# Patient Record
Sex: FEMALE | Race: WHITE | NOT HISPANIC OR LATINO | Employment: OTHER | URBAN - METROPOLITAN AREA
[De-identification: names, ages, dates, MRNs, and addresses within clinical notes are randomized per-mention and may not be internally consistent; named-entity substitution may affect disease eponyms.]

---

## 2017-05-02 RX ORDER — PHENAZOPYRIDINE HYDROCHLORIDE 200 MG/1
TABLET, FILM COATED ORAL
Qty: 12 TABLET | Refills: 0 | Status: SHIPPED | OUTPATIENT
Start: 2017-05-02 | End: 2021-02-11

## 2017-05-05 DIAGNOSIS — N93.0 PCB (POST COITAL BLEEDING): ICD-10-CM

## 2017-05-07 RX ORDER — ESTRADIOL 0.1 MG/G
CREAM VAGINAL
Qty: 42.5 G | Refills: 0 | Status: SHIPPED | OUTPATIENT
Start: 2017-05-07 | End: 2018-05-07 | Stop reason: SDUPTHER

## 2017-06-06 DIAGNOSIS — N93.0 PCB (POST COITAL BLEEDING): ICD-10-CM

## 2017-06-06 RX ORDER — ESTRADIOL 0.1 MG/G
CREAM VAGINAL
Qty: 42.5 G | Refills: 0 | Status: SHIPPED | OUTPATIENT
Start: 2017-06-06 | End: 2017-07-03 | Stop reason: SDUPTHER

## 2017-07-03 DIAGNOSIS — N93.0 PCB (POST COITAL BLEEDING): ICD-10-CM

## 2017-07-03 RX ORDER — ESTRADIOL 0.1 MG/G
CREAM VAGINAL
Qty: 42.5 G | Refills: 0 | Status: SHIPPED | OUTPATIENT
Start: 2017-07-03 | End: 2021-02-11

## 2017-07-11 ENCOUNTER — OFFICE VISIT (OUTPATIENT)
Dept: OCCUPATIONAL MEDICINE | Facility: CLINIC | Age: 67
End: 2017-07-11
Payer: OTHER GOVERNMENT

## 2017-07-11 VITALS — SYSTOLIC BLOOD PRESSURE: 122 MMHG | DIASTOLIC BLOOD PRESSURE: 80 MMHG

## 2017-07-11 DIAGNOSIS — M54.50 CHRONIC BILATERAL LOW BACK PAIN WITHOUT SCIATICA: Primary | ICD-10-CM

## 2017-07-11 DIAGNOSIS — G89.29 CHRONIC BILATERAL LOW BACK PAIN WITHOUT SCIATICA: Primary | ICD-10-CM

## 2017-07-11 DIAGNOSIS — S33.5XXD SPRAIN OF LIGAMENTS OF LUMBAR SPINE, SUBSEQUENT ENCOUNTER: ICD-10-CM

## 2017-07-11 DIAGNOSIS — M51.36 DEGENERATION OF LUMBAR INTERVERTEBRAL DISC: ICD-10-CM

## 2017-07-11 PROBLEM — M51.369 DEGENERATION OF LUMBAR INTERVERTEBRAL DISC: Status: ACTIVE | Noted: 2017-07-11

## 2017-07-11 PROBLEM — S33.5XXA SPRAIN OF LIGAMENTS OF LUMBAR SPINE: Status: ACTIVE | Noted: 2017-07-11

## 2017-07-11 PROCEDURE — 1159F MED LIST DOCD IN RCRD: CPT | Mod: S$GLB,,, | Performed by: PREVENTIVE MEDICINE

## 2017-07-11 PROCEDURE — 99202 OFFICE O/P NEW SF 15 MIN: CPT | Mod: S$GLB,,, | Performed by: PREVENTIVE MEDICINE

## 2017-07-11 RX ORDER — HYDROCODONE BITARTRATE AND ACETAMINOPHEN 10; 325 MG/1; MG/1
1 TABLET ORAL EVERY 8 HOURS PRN
Qty: 80 TABLET | Refills: 0 | Status: SHIPPED | OUTPATIENT
Start: 2017-07-11 | End: 2017-08-08 | Stop reason: SDUPTHER

## 2017-07-11 RX ORDER — HYDROCODONE BITARTRATE AND ACETAMINOPHEN 10; 325 MG/1; MG/1
TABLET ORAL
Refills: 0 | COMMUNITY
Start: 2017-06-06 | End: 2017-07-11 | Stop reason: SDUPTHER

## 2017-07-11 RX ORDER — CARISOPRODOL 350 MG/1
350 TABLET ORAL 2 TIMES DAILY
Qty: 40 TABLET | Refills: 0 | Status: SHIPPED | OUTPATIENT
Start: 2017-07-11 | End: 2017-08-08 | Stop reason: SDUPTHER

## 2017-07-11 RX ORDER — LIDOCAINE 50 MG/G
PATCH TOPICAL DAILY
Qty: 30 PATCH | Refills: 0 | OUTPATIENT
Start: 2017-07-11 | End: 2017-09-12 | Stop reason: SDUPTHER

## 2017-07-11 NOTE — LETTER
July 11, 2017      [facility] [place] [address]  [place] [address]  Phone: [phone]  Fax: [phone]       Patient: Sasha Michelle   YOB: 1950  Date of Visit: 07/11/2017    To Whom It May Concern:    Sasha Rivera was at [facility] on 07/11/2017. She may return to work/school on 7/11/2017 with permanent restrictions. Please return to clinic in 5 weeks on 8/15/17. If you have any questions or concerns, or if I can be of further assistance, please do not hesitate to contact our clinic.

## 2017-07-11 NOTE — PROGRESS NOTES
Subjective:       Patient ID: Sasha Michelle is a 66 y.o. female.    Chief Complaint: Lumbar Spine Pain (L-Spine) (Patient reports a pain level of 5, and denies radial pain.)    HPI  Review of Systems   Constitution: Negative.   HENT: Negative.    Eyes: Negative.    Cardiovascular: Negative.    Respiratory: Negative.    Endocrine: Negative.    Skin: Negative.    Musculoskeletal: Positive for back pain.   Gastrointestinal: Negative.    Genitourinary: Negative.    Allergic/Immunologic: Negative for hives.       Objective:      Physical Exam   Constitutional: She is oriented to person, place, and time. Vital signs are normal. She appears well-developed and well-nourished. She is active and cooperative. No distress.   HENT:   Head: Normocephalic and atraumatic.   Nose: Nose normal.   Mouth/Throat: Oropharynx is clear and moist and mucous membranes are normal.   Eyes: Conjunctivae and lids are normal.   Neck: Trachea normal, normal range of motion, full passive range of motion without pain and phonation normal. Neck supple.   Cardiovascular: Normal rate, regular rhythm, normal heart sounds, intact distal pulses and normal pulses.    Pulmonary/Chest: Effort normal and breath sounds normal.   Abdominal: Soft. Normal appearance and bowel sounds are normal. She exhibits no abdominal bruit, no pulsatile midline mass and no mass.   Musculoskeletal: She exhibits no edema.        Lumbar back: She exhibits decreased range of motion (flexion to 45, extension to 25 and lateral bending to 10 degrees), tenderness and spasm.   Neurological: She is alert and oriented to person, place, and time. She has normal strength and normal reflexes. No sensory deficit.   Skin: Skin is warm, dry and intact. She is not diaphoretic.   Psychiatric: She has a normal mood and affect. Her speech is normal and behavior is normal. Judgment and thought content normal. Cognition and memory are normal.   Nursing note and vitals reviewed.      Assessment:        1. Chronic bilateral low back pain without sciatica    2. Degeneration of lumbar intervertebral disc    3. Sprain of ligaments of lumbar spine, subsequent encounter        Plan:       Warm soaks at home daily with exercises  Return to clinic 4 weeks

## 2017-07-11 NOTE — PROGRESS NOTES
Subjective:       Patient ID: Sasha Michelle is a 66 y.o. female.    Chief Complaint: Lumbar Spine Pain (L-Spine) (Patient reports a pain level of 5, and denies radial pain.)    HPI  Review of Systems   Constitutional: Negative.    HENT: Negative.        Objective:      Physical Exam    Assessment:       No diagnosis found.    Plan:       ***

## 2017-08-08 ENCOUNTER — OFFICE VISIT (OUTPATIENT)
Dept: OCCUPATIONAL MEDICINE | Facility: CLINIC | Age: 67
End: 2017-08-08
Payer: OTHER GOVERNMENT

## 2017-08-08 DIAGNOSIS — G89.29 CHRONIC BILATERAL LOW BACK PAIN WITHOUT SCIATICA: ICD-10-CM

## 2017-08-08 DIAGNOSIS — M51.36 DEGENERATION OF LUMBAR INTERVERTEBRAL DISC: Primary | ICD-10-CM

## 2017-08-08 DIAGNOSIS — M54.50 CHRONIC BILATERAL LOW BACK PAIN WITHOUT SCIATICA: ICD-10-CM

## 2017-08-08 DIAGNOSIS — S33.5XXD SPRAIN OF LIGAMENTS OF LUMBAR SPINE, SUBSEQUENT ENCOUNTER: ICD-10-CM

## 2017-08-08 PROCEDURE — 1159F MED LIST DOCD IN RCRD: CPT | Mod: S$GLB,,, | Performed by: PREVENTIVE MEDICINE

## 2017-08-08 PROCEDURE — 99214 OFFICE O/P EST MOD 30 MIN: CPT | Mod: S$GLB,,, | Performed by: PREVENTIVE MEDICINE

## 2017-08-08 RX ORDER — CARISOPRODOL 350 MG/1
350 TABLET ORAL 2 TIMES DAILY
Qty: 40 TABLET | Refills: 0 | Status: SHIPPED | OUTPATIENT
Start: 2017-08-08 | End: 2017-09-12 | Stop reason: SDUPTHER

## 2017-08-08 RX ORDER — HYDROCODONE BITARTRATE AND ACETAMINOPHEN 10; 325 MG/1; MG/1
1 TABLET ORAL EVERY 8 HOURS PRN
Qty: 80 TABLET | Refills: 0 | Status: SHIPPED | OUTPATIENT
Start: 2017-08-08 | End: 2017-09-12 | Stop reason: SDUPTHER

## 2017-08-08 RX ORDER — IBUPROFEN 600 MG/1
600 TABLET ORAL EVERY 8 HOURS PRN
Qty: 60 TABLET | Refills: 0 | Status: SHIPPED | OUTPATIENT
Start: 2017-08-08 | End: 2018-02-06 | Stop reason: SDUPTHER

## 2017-08-08 NOTE — PROGRESS NOTES
Subjective:       Patient ID: Sasha Michelle is a 66 y.o. female.    Chief Complaint: Back Pain (Patient is a retired [employer] employee returning for a follow up on back injury sustained in 3/15/2001)    Back Pain       Review of Systems   Musculoskeletal: Positive for back pain.       Objective:      Physical Exam   Constitutional: She appears well-developed and well-nourished.   HENT:   Head: Normocephalic and atraumatic.   Eyes: EOM are normal. Pupils are equal, round, and reactive to light.   Neck: Normal range of motion.   Cardiovascular: Normal rate.    Pulmonary/Chest: Effort normal.   Musculoskeletal:        Right shoulder: She exhibits tenderness, pain and spasm. She exhibits no bony tenderness, no swelling, no effusion, no crepitus, no deformity, no laceration, normal pulse and normal strength.        Lumbar back: She exhibits decreased range of motion, tenderness and pain. She exhibits no bony tenderness, no swelling, no edema, no deformity, no laceration, no spasm and normal pulse.        Arms:  Neurological: She is alert.   No focal neurologic deficits   Skin: Skin is warm and dry.   Psychiatric: She has a normal mood and affect.   Nursing note and vitals reviewed.      Assessment:       1. Degeneration of lumbar intervertebral disc    2. Chronic bilateral low back pain without sciatica    3. Sprain of ligaments of lumbar spine, subsequent encounter        Plan:           Medications Ordered This Encounter      carisoprodol (SOMA) 350 MG tablet          Sig: Take 1 tablet (350 mg total) by mouth 2 (two) times daily.          Dispense:  40 tablet          Refill:  0      hydrocodone-acetaminophen 10-325mg (NORCO)  mg Tab          Sig: Take 1 tablet by mouth every 8 (eight) hours as needed for Pain.          Dispense:  80 tablet          Refill:  0      ibuprofen (ADVIL,MOTRIN) 600 MG tablet          Sig: Take 1 tablet (600 mg total) by mouth every 8 (eight) hours as needed.          Dispense:  60  tablet          Refill:  0  Patient Instructions: Daily home exercises/warm soaks              Return in about 5 weeks (around 9/12/2017).

## 2017-09-12 ENCOUNTER — OFFICE VISIT (OUTPATIENT)
Dept: OCCUPATIONAL MEDICINE | Facility: CLINIC | Age: 67
End: 2017-09-12
Payer: OTHER GOVERNMENT

## 2017-09-12 VITALS — DIASTOLIC BLOOD PRESSURE: 78 MMHG | SYSTOLIC BLOOD PRESSURE: 120 MMHG | HEART RATE: 61 BPM | OXYGEN SATURATION: 98 %

## 2017-09-12 DIAGNOSIS — M51.36 DEGENERATION OF LUMBAR INTERVERTEBRAL DISC: Primary | ICD-10-CM

## 2017-09-12 DIAGNOSIS — S33.5XXD SPRAIN OF LIGAMENTS OF LUMBAR SPINE, SUBSEQUENT ENCOUNTER: ICD-10-CM

## 2017-09-12 DIAGNOSIS — M54.50 CHRONIC BILATERAL LOW BACK PAIN WITHOUT SCIATICA: ICD-10-CM

## 2017-09-12 DIAGNOSIS — G89.29 CHRONIC BILATERAL LOW BACK PAIN WITHOUT SCIATICA: ICD-10-CM

## 2017-09-12 PROCEDURE — 1159F MED LIST DOCD IN RCRD: CPT | Mod: S$GLB,,, | Performed by: PREVENTIVE MEDICINE

## 2017-09-12 PROCEDURE — 1125F AMNT PAIN NOTED PAIN PRSNT: CPT | Mod: S$GLB,,, | Performed by: PREVENTIVE MEDICINE

## 2017-09-12 PROCEDURE — 99214 OFFICE O/P EST MOD 30 MIN: CPT | Mod: S$GLB,,, | Performed by: PREVENTIVE MEDICINE

## 2017-09-12 RX ORDER — LIDOCAINE 50 MG/G
PATCH TOPICAL DAILY
Qty: 30 PATCH | Refills: 0 | Status: SHIPPED | OUTPATIENT
Start: 2017-09-12 | End: 2017-10-10 | Stop reason: SDUPTHER

## 2017-09-12 RX ORDER — CARISOPRODOL 350 MG/1
350 TABLET ORAL 2 TIMES DAILY
Qty: 40 TABLET | Refills: 0 | Status: SHIPPED | OUTPATIENT
Start: 2017-09-12 | End: 2017-10-10 | Stop reason: SDUPTHER

## 2017-09-12 RX ORDER — HYDROCODONE BITARTRATE AND ACETAMINOPHEN 10; 325 MG/1; MG/1
1 TABLET ORAL EVERY 8 HOURS PRN
Qty: 80 TABLET | Refills: 0 | Status: SHIPPED | OUTPATIENT
Start: 2017-09-12 | End: 2017-10-10 | Stop reason: SDUPTHER

## 2017-09-12 NOTE — LETTER
[facility]  [address] [address]  [place] [address]  Phone: [phone]  Fax: [phone]    Pt Name: Sasha Michelle  Injury Date: 03/15/2017   Employee ID:  Date of Treatment: 09/12/2017   Company: [employer]            Appointment Time: 09:45 AM Arrived: 10:00 AM CDT   Physician: Miguel Staples MD Departed: 11:22 AM           Office Treatment: Sasha was seen today for back pain.    Diagnoses and all orders for this visit:    Degeneration of lumbar intervertebral disc  Chronic bilateral low back pain without sciatica  Sprain of ligaments of lumbar spine, subsequent encounter    Other orders  -     lidocaine (LIDODERM) 5 %; Place onto the skin once daily.  -     hydrocodone-acetaminophen 10-325mg (NORCO)  mg Tab; Take 1 tablet by mouth every 8 (eight) hours as needed for Pain.  -     carisoprodol (SOMA) 350 MG tablet; Take 1 tablet (350 mg total) by mouth 2 (two) times daily.       Patient Instructions: Daily home exercises/warm soaks            Return Appointment:   10/10/2017  at  10:15 AM

## 2017-09-12 NOTE — PROGRESS NOTES
Subjective:       Patient ID: Sasha Michelle is a 66 y.o. female.    Chief Complaint: Back Pain (Retired [employer] employee returns for followup of back injury sustained in 3/15/2001.)    Patient reports neck pain hasn't change much since last visit, however she continues to use rx as directed and home exercises. (yal)      Back Pain   The current episode started more than 1 year ago. The problem occurs intermittently. The pain is present in the lumbar spine. The quality of the pain is described as burning. The pain does not radiate. The pain is at a severity of 4/10. The pain is moderate. The pain is worse during the day. The symptoms are aggravated by sitting. Stiffness is present in the morning. Pertinent negatives include no abdominal pain, bladder incontinence, bowel incontinence, chest pain, dysuria, headaches, numbness or tingling. She has tried muscle relaxant and home exercises for the symptoms. The treatment provided moderate relief.     Review of Systems   Constitution: Negative for chills.   HENT: Negative for tinnitus.    Eyes: Negative for blurred vision.   Cardiovascular: Negative for chest pain.   Respiratory: Negative for shortness of breath.    Skin: Negative for itching and rash.   Musculoskeletal: Positive for back pain, myalgias and stiffness. Negative for neck pain.   Gastrointestinal: Negative for abdominal pain, bowel incontinence, nausea and vomiting.   Genitourinary: Negative for bladder incontinence and dysuria.   Neurological: Positive for dizziness. Negative for headaches, numbness and tingling.   Allergic/Immunologic: Negative for hives.       Objective:      Physical Exam   Constitutional: She appears well-developed and well-nourished.   HENT:   Head: Normocephalic and atraumatic.   Eyes: EOM are normal.   Neck: Normal range of motion.   Cardiovascular: Normal rate.    Pulmonary/Chest: Effort normal.   Musculoskeletal:        Lumbar back: She exhibits decreased range of motion, tenderness,  pain and spasm. She exhibits no bony tenderness, no swelling, no edema, no deformity, no laceration and normal pulse.        Back:    Neurological: She is alert.   No focal neurologic deficits   Skin: Skin is warm and dry.   Psychiatric: She has a normal mood and affect.   Nursing note and vitals reviewed.      Assessment:       1. Degeneration of lumbar intervertebral disc    2. Chronic bilateral low back pain without sciatica    3. Sprain of ligaments of lumbar spine, subsequent encounter        Plan:           Medications Ordered This Encounter      carisoprodol (SOMA) 350 MG tablet          Sig: Take 1 tablet (350 mg total) by mouth 2 (two) times daily.          Dispense:  40 tablet          Refill:  0      hydrocodone-acetaminophen 10-325mg (NORCO)  mg Tab          Sig: Take 1 tablet by mouth every 8 (eight) hours as needed for Pain.          Dispense:  80 tablet          Refill:  0      lidocaine (LIDODERM) 5 %          Sig: Place onto the skin once daily.          Dispense:  30 patch          Refill:  0  Patient Instructions: Daily home exercises/warm soaks      Return in about 4 weeks (around 10/10/2017).

## 2017-10-10 ENCOUNTER — OFFICE VISIT (OUTPATIENT)
Dept: OCCUPATIONAL MEDICINE | Facility: CLINIC | Age: 67
End: 2017-10-10
Payer: OTHER GOVERNMENT

## 2017-10-10 DIAGNOSIS — M51.36 DEGENERATION OF LUMBAR INTERVERTEBRAL DISC: Primary | ICD-10-CM

## 2017-10-10 DIAGNOSIS — M54.50 CHRONIC BILATERAL LOW BACK PAIN WITHOUT SCIATICA: ICD-10-CM

## 2017-10-10 DIAGNOSIS — G89.29 CHRONIC BILATERAL LOW BACK PAIN WITHOUT SCIATICA: ICD-10-CM

## 2017-10-10 DIAGNOSIS — S33.5XXD SPRAIN OF LIGAMENTS OF LUMBAR SPINE, SUBSEQUENT ENCOUNTER: ICD-10-CM

## 2017-10-10 PROCEDURE — 99214 OFFICE O/P EST MOD 30 MIN: CPT | Mod: S$GLB,,, | Performed by: PREVENTIVE MEDICINE

## 2017-10-10 RX ORDER — CARISOPRODOL 350 MG/1
350 TABLET ORAL 2 TIMES DAILY
Qty: 40 TABLET | Refills: 0 | Status: SHIPPED | OUTPATIENT
Start: 2017-10-10 | End: 2017-11-09 | Stop reason: SDUPTHER

## 2017-10-10 RX ORDER — LIDOCAINE 50 MG/G
PATCH TOPICAL DAILY
Qty: 30 PATCH | Refills: 0 | Status: SHIPPED | OUTPATIENT
Start: 2017-10-10 | End: 2017-11-09 | Stop reason: SDUPTHER

## 2017-10-10 RX ORDER — HYDROCODONE BITARTRATE AND ACETAMINOPHEN 10; 325 MG/1; MG/1
1 TABLET ORAL EVERY 8 HOURS PRN
Qty: 80 TABLET | Refills: 0 | Status: SHIPPED | OUTPATIENT
Start: 2017-10-10 | End: 2017-11-09 | Stop reason: SDUPTHER

## 2017-10-10 NOTE — PROGRESS NOTES
Subjective:       Patient ID: Sasha Michelle is a 66 y.o. female.    Chief Complaint: Back Pain (follow up from 03/2001) and Hip Pain (follow up from 03/2001)    Pt is here for a follow up on a back injury from 03/2001.  She says her back and right hip are hurting and the pain comes and goes.  CT      Back Pain   This is a chronic problem. The current episode started more than 1 year ago. The problem occurs intermittently. The problem is unchanged. The pain is present in the lumbar spine and sacro-iliac. The quality of the pain is described as aching. The pain radiates to the right thigh. The pain is at a severity of 6/10. The pain is moderate. The pain is the same all the time. The symptoms are aggravated by twisting, bending, lying down, position, sitting and standing. Associated symptoms include weakness. She has tried heat, home exercises, ice, muscle relaxant, NSAIDs, walking and bed rest for the symptoms. The treatment provided mild relief.   Hip Pain    The incident occurred more than 1 week ago. The incident occurred at work. The injury mechanism is unknown. The pain is present in the right hip. The pain is at a severity of 6/10. The pain is moderate. The pain has been fluctuating since onset. Associated symptoms include muscle weakness. She reports no foreign bodies present. The symptoms are aggravated by movement and weight bearing. She has tried elevation, heat, ice, NSAIDs, non-weight bearing and rest for the symptoms. The treatment provided no relief.     Review of Systems   Constitution: Positive for weakness.   Musculoskeletal: Positive for back pain, joint pain, muscle weakness and stiffness.       Objective:      Physical Exam   Constitutional: She appears well-developed and well-nourished.   HENT:   Head: Normocephalic.   Eyes: Pupils are equal, round, and reactive to light.   Neck: Normal range of motion.   Cardiovascular: Normal rate.    Pulmonary/Chest: Effort normal.   Musculoskeletal:         Lumbar back: She exhibits decreased range of motion, tenderness and pain. She exhibits no bony tenderness, no swelling, no edema, no deformity, no laceration, no spasm and normal pulse.        Back:    Pain about low back with limited range of motion. Pain with flexion, extension and rotation of the back.   Neurological: She is alert.   No focal neurologic deficits   Skin: Skin is warm.   Psychiatric: She has a normal mood and affect.   Nursing note and vitals reviewed.      Assessment:       1. Degeneration of lumbar intervertebral disc    2. Chronic bilateral low back pain without sciatica    3. Sprain of ligaments of lumbar spine, subsequent encounter        Plan:           Medications Ordered This Encounter      carisoprodol (SOMA) 350 MG tablet          Sig: Take 1 tablet (350 mg total) by mouth 2 (two) times daily.          Dispense:  40 tablet          Refill:  0      hydrocodone-acetaminophen 10-325mg (NORCO)  mg Tab          Sig: Take 1 tablet by mouth every 8 (eight) hours as needed for Pain.          Dispense:  80 tablet          Refill:  0      lidocaine (LIDODERM) 5 %          Sig: Place onto the skin once daily.          Dispense:  30 patch          Refill:  0  Patient Instructions: Daily home exercises/warm soaks   Restrictions: Disabled until next office visit  Return in about 4 weeks (around 11/7/2017).

## 2017-10-10 NOTE — LETTER
[facility] [place] [address]  [place] [address]  Phone: [phone]  Fax: [phone]    Pt Name: Sasha Michelle  Injury Date: 05/2001   Employee ID:  Date of Treatment: 10/10/2017   Company: [employer]            Appointment Time: 10:00 AM Arrived: 10:15 AM CDT   Physician: Miguel Staples MD Departed: 10:50 AM           Office Treatment: Sasha was seen today for back pain and hip pain.    Diagnoses and all orders for this visit: EXAM, RX GIVEN    Degeneration of lumbar intervertebral disc    Chronic bilateral low back pain without sciatica  -     lidocaine (LIDODERM) 5 %; Place onto the skin once daily.  -     carisoprodol (SOMA) 350 MG tablet; Take 1 tablet (350 mg total) by mouth 2 (two) times daily.    Sprain of ligaments of lumbar spine, subsequent encounter  -     hydrocodone-acetaminophen 10-325mg (NORCO)  mg Tab; Take 1 tablet by mouth every 8 (eight) hours as needed for Pain.     Patient Instructions: Daily home exercises/warm soaks    Restrictions: Disabled until next office visit 11/07/2017 @9:00 AM       Return Appointment: 11/07/2017 @ 9:00 AM

## 2017-11-09 ENCOUNTER — OFFICE VISIT (OUTPATIENT)
Dept: OCCUPATIONAL MEDICINE | Facility: CLINIC | Age: 67
End: 2017-11-09
Payer: OTHER GOVERNMENT

## 2017-11-09 DIAGNOSIS — M51.36 DEGENERATION OF LUMBAR INTERVERTEBRAL DISC: Primary | ICD-10-CM

## 2017-11-09 DIAGNOSIS — S33.5XXD SPRAIN OF LIGAMENTS OF LUMBAR SPINE, SUBSEQUENT ENCOUNTER: ICD-10-CM

## 2017-11-09 DIAGNOSIS — G89.29 CHRONIC BILATERAL LOW BACK PAIN WITHOUT SCIATICA: ICD-10-CM

## 2017-11-09 DIAGNOSIS — M54.50 CHRONIC BILATERAL LOW BACK PAIN WITHOUT SCIATICA: ICD-10-CM

## 2017-11-09 PROCEDURE — 99214 OFFICE O/P EST MOD 30 MIN: CPT | Mod: S$GLB,,, | Performed by: PREVENTIVE MEDICINE

## 2017-11-09 RX ORDER — HYDROCODONE BITARTRATE AND ACETAMINOPHEN 10; 325 MG/1; MG/1
1 TABLET ORAL EVERY 8 HOURS PRN
Qty: 80 TABLET | Refills: 0 | Status: SHIPPED | OUTPATIENT
Start: 2017-11-09 | End: 2017-12-05 | Stop reason: SDUPTHER

## 2017-11-09 RX ORDER — LIDOCAINE 50 MG/G
PATCH TOPICAL DAILY
Qty: 30 PATCH | Refills: 0 | Status: SHIPPED | OUTPATIENT
Start: 2017-11-09 | End: 2018-02-06 | Stop reason: SDUPTHER

## 2017-11-09 RX ORDER — CARISOPRODOL 350 MG/1
350 TABLET ORAL 2 TIMES DAILY
Qty: 40 TABLET | Refills: 0 | Status: SHIPPED | OUTPATIENT
Start: 2017-11-09 | End: 2017-12-05 | Stop reason: SDUPTHER

## 2017-11-09 NOTE — LETTER
[facility] [place] [address]  [place] [address]  Phone: [phone]  Fax: [phone]    Pt Name: Sasha Michelle  Injury Date: 03/2001   Employee ID:  Date of Treatment: 11/09/2017   Company: [employer]            Appointment Time: 09:45 AM Arrived: 08:30 AM CST   Physician: Miguel Staples MD DEPARTED: 1215 PM           Office Treatment: Sasha was seen today for back pain.    Diagnoses and all orders for this visit: EXAM, RX GIVEN, DISABLED UNTIL NEXT OFFICE VISIT    Degeneration of lumbar intervertebral disc  Chronic bilateral low back pain without sciatica  -     lidocaine (LIDODERM) 5 %; Place onto the skin once daily.  -     carisoprodol (SOMA) 350 MG tablet; Take 1 tablet (350 mg total) by mouth 2 (two) times daily.  Sprain of ligaments of lumbar spine, subsequent encounter  -     hydrocodone-acetaminophen 10-325mg (NORCO)  mg Tab; Take 1 tablet by mouth every 8 (eight) hours as needed for Pain.     Patient Instructions: Daily home exercises/warm soaks    Restrictions: Disabled until next office visit       Return Appointment: FOLLOW UP ON 12/5/17 @ 0845 AM

## 2017-11-09 NOTE — PROGRESS NOTES
Subjective:       Patient ID: Sasha Michelle is a 66 y.o. female.    Chief Complaint: Back Pain (FOLLOW UP FROM 03/15/2001)    Pt is here for a FOLLOW UP on a BACK injury from 03/15/2001.  She just got back from a vacation with her  where she sat for over 2 hours in the car.  Her RIGHT HIP has begun to ache again since then.  She states that she has started walking since her return which is helping.  CT      Back Pain   This is a chronic problem. The current episode started more than 1 year ago. The problem occurs intermittently. The problem is unchanged. The pain is present in the lumbar spine and sacro-iliac. The quality of the pain is described as cramping and aching. The pain radiates to the right thigh. The pain is at a severity of 4/10. The pain is mild. The pain is worse during the day. The symptoms are aggravated by sitting and position. Stiffness is present in the morning. Pertinent negatives include no abdominal pain, bladder incontinence, bowel incontinence, dysuria or numbness. She has tried home exercises, heat, muscle relaxant and NSAIDs for the symptoms. The treatment provided mild relief.     Review of Systems   Constitution: Negative for malaise/fatigue.   Skin: Negative for rash.   Musculoskeletal: Positive for back pain, muscle weakness and stiffness. Negative for muscle cramps.   Gastrointestinal: Negative for abdominal pain and bowel incontinence.   Genitourinary: Negative for bladder incontinence, dysuria, hematuria and urgency.   Neurological: Negative for disturbances in coordination and numbness.   All other systems reviewed and are negative.      Objective:      Physical Exam   Constitutional: She appears well-developed and well-nourished.   HENT:   Head: Normocephalic and atraumatic.   Eyes: EOM are normal.   Neck: Normal range of motion.   Cardiovascular: Normal rate.    Pulmonary/Chest: Effort normal.   Musculoskeletal:        Lumbar back: She exhibits decreased range of motion,  tenderness, pain and spasm. She exhibits no bony tenderness, no swelling, no edema, no deformity, no laceration and normal pulse.        Back:    Pain about low back limited by complaints of pain with flexion, extension and rotation of back. No focal neurologic deficits noted   Neurological: She is alert.   No focal neurologic deficits   Skin: Skin is warm and dry.   Psychiatric: She has a normal mood and affect.   Nursing note and vitals reviewed.      Assessment:       1. Degeneration of lumbar intervertebral disc    2. Chronic bilateral low back pain without sciatica    3. Sprain of ligaments of lumbar spine, subsequent encounter        Plan:           Medications Ordered This Encounter      carisoprodol (SOMA) 350 MG tablet          Sig: Take 1 tablet (350 mg total) by mouth 2 (two) times daily.          Dispense:  40 tablet          Refill:  0      hydrocodone-acetaminophen 10-325mg (NORCO)  mg Tab          Sig: Take 1 tablet by mouth every 8 (eight) hours as needed for Pain.          Dispense:  80 tablet          Refill:  0      lidocaine (LIDODERM) 5 %          Sig: Place onto the skin once daily.          Dispense:  30 patch          Refill:  0  Patient Instructions: Daily home exercises/warm soaks   Restrictions: Disabled until next office visit  Return in about 4 weeks (around 12/7/2017).

## 2017-12-05 ENCOUNTER — OFFICE VISIT (OUTPATIENT)
Dept: OCCUPATIONAL MEDICINE | Facility: CLINIC | Age: 67
End: 2017-12-05
Payer: OTHER GOVERNMENT

## 2017-12-05 DIAGNOSIS — M51.36 DEGENERATION OF LUMBAR INTERVERTEBRAL DISC: Primary | ICD-10-CM

## 2017-12-05 DIAGNOSIS — M54.50 CHRONIC BILATERAL LOW BACK PAIN WITHOUT SCIATICA: ICD-10-CM

## 2017-12-05 DIAGNOSIS — S33.5XXD SPRAIN OF LIGAMENTS OF LUMBAR SPINE, SUBSEQUENT ENCOUNTER: ICD-10-CM

## 2017-12-05 DIAGNOSIS — G89.29 CHRONIC BILATERAL LOW BACK PAIN WITHOUT SCIATICA: ICD-10-CM

## 2017-12-05 PROCEDURE — 99214 OFFICE O/P EST MOD 30 MIN: CPT | Mod: S$GLB,,, | Performed by: PREVENTIVE MEDICINE

## 2017-12-05 RX ORDER — HYDROCODONE BITARTRATE AND ACETAMINOPHEN 10; 325 MG/1; MG/1
1 TABLET ORAL EVERY 8 HOURS PRN
Qty: 80 TABLET | Refills: 0 | Status: SHIPPED | OUTPATIENT
Start: 2017-12-05 | End: 2018-02-06 | Stop reason: SDUPTHER

## 2017-12-05 RX ORDER — CARISOPRODOL 350 MG/1
350 TABLET ORAL 2 TIMES DAILY
Qty: 40 TABLET | Refills: 0 | Status: SHIPPED | OUTPATIENT
Start: 2017-12-05 | End: 2018-01-14

## 2017-12-05 NOTE — LETTER
[facility] [place] [address]  [place] [address]  Phone: [phone]  Fax: [phone]    Pt Name: Sasha Michelle  Injury Date: 03/15/2001   Employee ID:  Date of First Treatment: 12/05/2017   Company: [employer]            Appointment Time: 08:40 AM Arrived:  8:55 AM CST   Physician: Miguel Staples MD Departed: 10:00 AM           Office Treatment: Sasha was seen today for back pain.    Diagnoses and all orders for this visit: EXAM, DISABLED UNTIL NEXT OFFICE VISIT    Degeneration of lumbar intervertebral disc  Chronic bilateral low back pain without sciatica  -     carisoprodol (SOMA) 350 MG tablet; Take 1 tablet (350 mg total) by mouth 2 (two) times daily.  Sprain of ligaments of lumbar spine, subsequent encounter  -     hydrocodone-acetaminophen 10-325mg (NORCO)  mg Tab; Take 1 tablet by mouth every 8 (eight) hours as needed for Pain.       Patient Instructions: Daily home exercises/warm soaks    Restrictions: Disabled until next office visit       Return Appointment: 01/02/2018  @ 0845 AM

## 2017-12-05 NOTE — PROGRESS NOTES
Subjective:       Patient ID: Sasha Michelle is a 66 y.o. female.    Chief Complaint: Back Pain (RV)    Pt returned to the clinic today for a follow up visit for back pain. Pt states her injury has not improved since her last office visit and things are still the same. IJ      Back Pain   This is a recurrent problem. The current episode started more than 1 month ago. The problem occurs constantly. The problem is unchanged. The pain is present in the lumbar spine. The quality of the pain is described as shooting. The pain is at a severity of 6/10. The pain is mild. The pain is the same all the time. The symptoms are aggravated by bending. Pertinent negatives include no abdominal pain, chest pain, fever or headaches.     Review of Systems   Constitution: Negative for chills and fever.   HENT: Negative for sore throat.    Eyes: Negative for blurred vision.   Cardiovascular: Negative for chest pain.   Respiratory: Negative for shortness of breath.    Skin: Negative for rash.   Musculoskeletal: Positive for back pain. Negative for joint pain.   Gastrointestinal: Negative for abdominal pain, diarrhea, nausea and vomiting.   Neurological: Negative for headaches.   Psychiatric/Behavioral: The patient is not nervous/anxious.        Objective:      Physical Exam   Constitutional: She appears well-developed and well-nourished.   HENT:   Head: Normocephalic and atraumatic.   Eyes: EOM are normal. Pupils are equal, round, and reactive to light.   Neck: Normal range of motion. Neck supple.   Cardiovascular: Normal rate.    Pulmonary/Chest: Effort normal.   Musculoskeletal:        Cervical back: She exhibits decreased range of motion, tenderness and pain. She exhibits no bony tenderness, no swelling, no edema, no deformity, no laceration, no spasm and normal pulse.        Lumbar back: She exhibits decreased range of motion, tenderness, pain and spasm. She exhibits no bony tenderness, no swelling, no edema, no deformity, no  laceration and normal pulse.        Back:    Pain with palpation of low back with flexion to 90, extension to 25 and lateral bending to 25 degrees. No motor or sensory deficits.    Neurological: She is alert.   No focal neurologic deficits   Skin: Skin is warm.   Psychiatric: She has a normal mood and affect. Her behavior is normal. Judgment and thought content normal.   Nursing note and vitals reviewed.      Assessment:       1. Degeneration of lumbar intervertebral disc    2. Chronic bilateral low back pain without sciatica    3. Sprain of ligaments of lumbar spine, subsequent encounter        Plan:           Medications Ordered This Encounter      carisoprodol (SOMA) 350 MG tablet          Sig: Take 1 tablet (350 mg total) by mouth 2 (two) times daily.          Dispense:  40 tablet          Refill:  0      hydrocodone-acetaminophen 10-325mg (NORCO)  mg Tab          Sig: Take 1 tablet by mouth every 8 (eight) hours as needed for Pain.          Dispense:  80 tablet          Refill:  0  Patient Instructions: Daily home exercises/warm soaks   Restrictions: Disabled until next office visit  Return in about 4 weeks (around 1/4/2018).

## 2017-12-29 ENCOUNTER — TELEPHONE (OUTPATIENT)
Dept: OCCUPATIONAL MEDICINE | Facility: CLINIC | Age: 67
End: 2017-12-29

## 2017-12-29 NOTE — TELEPHONE ENCOUNTER
I called pt to ask her to reschedule due to Dr. Staples most likely not coming that day.  She said she will need refills on her soma, hydrocodone, and lido patches soon as he returns.  I said I would take care if it for her and call her when ready.  CT

## 2018-02-06 ENCOUNTER — OFFICE VISIT (OUTPATIENT)
Dept: OCCUPATIONAL MEDICINE | Facility: CLINIC | Age: 68
End: 2018-02-06
Payer: OTHER GOVERNMENT

## 2018-02-06 DIAGNOSIS — M51.36 DEGENERATION OF LUMBAR INTERVERTEBRAL DISC: Primary | ICD-10-CM

## 2018-02-06 DIAGNOSIS — M54.50 CHRONIC BILATERAL LOW BACK PAIN WITHOUT SCIATICA: ICD-10-CM

## 2018-02-06 DIAGNOSIS — G89.29 CHRONIC BILATERAL LOW BACK PAIN WITHOUT SCIATICA: ICD-10-CM

## 2018-02-06 DIAGNOSIS — S33.5XXD SPRAIN OF LIGAMENTS OF LUMBAR SPINE, SUBSEQUENT ENCOUNTER: ICD-10-CM

## 2018-02-06 PROCEDURE — 99213 OFFICE O/P EST LOW 20 MIN: CPT | Mod: S$GLB,,, | Performed by: PREVENTIVE MEDICINE

## 2018-02-06 PROCEDURE — 1159F MED LIST DOCD IN RCRD: CPT | Mod: S$GLB,,, | Performed by: PREVENTIVE MEDICINE

## 2018-02-06 RX ORDER — CARISOPRODOL 350 MG/1
350 TABLET ORAL 2 TIMES DAILY PRN
Qty: 40 TABLET | Refills: 0 | Status: SHIPPED | OUTPATIENT
Start: 2018-02-06 | End: 2018-03-06 | Stop reason: SDUPTHER

## 2018-02-06 RX ORDER — IBUPROFEN 600 MG/1
600 TABLET ORAL EVERY 8 HOURS PRN
Qty: 60 TABLET | Refills: 0 | Status: SHIPPED | OUTPATIENT
Start: 2018-02-06 | End: 2018-03-06 | Stop reason: SDUPTHER

## 2018-02-06 RX ORDER — HYDROCODONE BITARTRATE AND ACETAMINOPHEN 10; 325 MG/1; MG/1
1 TABLET ORAL EVERY 8 HOURS PRN
Qty: 80 TABLET | Refills: 0 | Status: SHIPPED | OUTPATIENT
Start: 2018-02-06 | End: 2018-03-06 | Stop reason: SDUPTHER

## 2018-02-06 RX ORDER — LIDOCAINE 50 MG/G
PATCH TOPICAL DAILY
Qty: 30 PATCH | Refills: 0 | Status: SHIPPED | OUTPATIENT
Start: 2018-02-06 | End: 2018-03-06 | Stop reason: SDUPTHER

## 2018-02-06 NOTE — PROGRESS NOTES
Subjective:       Patient ID: Sasha Michelle is a 67 y.o. female.    Chief Complaint: Back Pain    Pt returned to the clinic today for a follow up visit for back pain. Pt states her injury has not improved since her last office visit and things are still the same. CT      Back Pain   This is a recurrent problem. The current episode started more than 1 month ago. The problem occurs constantly. The problem is unchanged. The pain is present in the lumbar spine. The quality of the pain is described as shooting. The pain is at a severity of 6/10. The pain is mild. The pain is the same all the time. The symptoms are aggravated by bending. Pertinent negatives include no abdominal pain, chest pain, fever or headaches.     Review of Systems   Constitution: Negative for chills and fever.   HENT: Negative for sore throat.    Eyes: Negative for blurred vision.   Cardiovascular: Negative for chest pain.   Respiratory: Negative for shortness of breath.    Skin: Negative for rash.   Musculoskeletal: Positive for back pain. Negative for joint pain.   Gastrointestinal: Negative for abdominal pain, diarrhea, nausea and vomiting.   Neurological: Negative for headaches.   Psychiatric/Behavioral: The patient is not nervous/anxious.        Objective:      Physical Exam   Constitutional: She appears well-developed and well-nourished.   HENT:   Head: Normocephalic and atraumatic.   Eyes: EOM are normal. Pupils are equal, round, and reactive to light.   Neck: Normal range of motion. Neck supple.   Cardiovascular: Normal rate.    Pulmonary/Chest: Effort normal.   Musculoskeletal:        Cervical back: She exhibits decreased range of motion, tenderness and pain. She exhibits no bony tenderness, no swelling, no edema, no deformity, no laceration, no spasm and normal pulse.        Lumbar back: She exhibits decreased range of motion, tenderness, pain and spasm. She exhibits no bony tenderness, no swelling, no edema, no deformity, no laceration  and normal pulse.        Back:    Pain with palpation of low back with flexion to 90, extension to 25 and lateral bending to 25 degrees. No motor or sensory deficits.    Neurological: She is alert.   No focal neurologic deficits   Skin: Skin is warm.   Psychiatric: She has a normal mood and affect. Her behavior is normal. Judgment and thought content normal.   Nursing note and vitals reviewed.      Assessment:       1. Degeneration of lumbar intervertebral disc    2. Chronic bilateral low back pain without sciatica    3. Sprain of ligaments of lumbar spine, subsequent encounter        Plan:           Medications Ordered This Encounter      carisoprodol (SOMA) 350 MG tablet          Sig: Take 1 tablet (350 mg total) by mouth 2 (two) times daily as needed for Muscle spasms.          Dispense:  40 tablet          Refill:  0      hydrocodone-acetaminophen 10-325mg (NORCO)  mg Tab          Sig: Take 1 tablet by mouth every 8 (eight) hours as needed for Pain.          Dispense:  80 tablet          Refill:  0      ibuprofen (ADVIL,MOTRIN) 600 MG tablet          Sig: Take 1 tablet (600 mg total) by mouth every 8 (eight) hours as needed.          Dispense:  60 tablet          Refill:  0      lidocaine (LIDODERM) 5 %          Sig: Place onto the skin once daily.          Dispense:  30 patch          Refill:  0  Patient Instructions: Daily home exercises/warm soaks   Restrictions: Disabled until next office visit  Follow-up in about 4 weeks (around 3/6/2018).

## 2018-03-06 ENCOUNTER — OFFICE VISIT (OUTPATIENT)
Dept: OCCUPATIONAL MEDICINE | Facility: CLINIC | Age: 68
End: 2018-03-06
Payer: OTHER GOVERNMENT

## 2018-03-06 DIAGNOSIS — M51.36 DEGENERATION OF LUMBAR INTERVERTEBRAL DISC: Primary | ICD-10-CM

## 2018-03-06 DIAGNOSIS — G89.29 CHRONIC BILATERAL LOW BACK PAIN WITHOUT SCIATICA: ICD-10-CM

## 2018-03-06 DIAGNOSIS — S33.5XXD SPRAIN OF LIGAMENTS OF LUMBAR SPINE, SUBSEQUENT ENCOUNTER: ICD-10-CM

## 2018-03-06 DIAGNOSIS — M54.50 CHRONIC BILATERAL LOW BACK PAIN WITHOUT SCIATICA: ICD-10-CM

## 2018-03-06 PROCEDURE — 99213 OFFICE O/P EST LOW 20 MIN: CPT | Mod: S$GLB,,, | Performed by: PREVENTIVE MEDICINE

## 2018-03-06 RX ORDER — LIDOCAINE 50 MG/G
PATCH TOPICAL DAILY
Qty: 30 PATCH | Refills: 0 | Status: SHIPPED | OUTPATIENT
Start: 2018-03-06 | End: 2018-04-12 | Stop reason: SDUPTHER

## 2018-03-06 RX ORDER — CARISOPRODOL 350 MG/1
350 TABLET ORAL 2 TIMES DAILY PRN
Qty: 40 TABLET | Refills: 0 | Status: SHIPPED | OUTPATIENT
Start: 2018-03-06 | End: 2018-04-12 | Stop reason: SDUPTHER

## 2018-03-06 RX ORDER — IBUPROFEN 600 MG/1
600 TABLET ORAL EVERY 8 HOURS PRN
Qty: 60 TABLET | Refills: 0 | Status: SHIPPED | OUTPATIENT
Start: 2018-03-06 | End: 2018-04-12 | Stop reason: SDUPTHER

## 2018-03-06 RX ORDER — HYDROCODONE BITARTRATE AND ACETAMINOPHEN 10; 325 MG/1; MG/1
1 TABLET ORAL EVERY 8 HOURS PRN
Qty: 80 TABLET | Refills: 0 | Status: SHIPPED | OUTPATIENT
Start: 2018-03-06 | End: 2018-04-12 | Stop reason: SDUPTHER

## 2018-03-06 NOTE — LETTER
[facility] [place] [address]  [place] [address]  Phone: [phone]  Fax: [phone]    Pt Name: Sasha Michelle  Injury Date: 03/15/2001   Employee ID:  Date of First Treatment: 03/06/2018   Company: [employer]            Appointment Time: 08:45 AM Arrived:  11:00 AM CST   Appointment Date: [unfilled] Time Out:1210 PM    Physician: Miguel Staples MD        Office Treatment: Sasha was seen today for back pain and hip pain.    Diagnoses and all orders for this visit:EXAM, RX REFILL GIVEN X 4, DISABLED UNTIL NEXT OFFICE VISIT    Degeneration of lumbar intervertebral disc  -     ibuprofen (ADVIL,MOTRIN) 600 MG tablet; Take 1 tablet (600 mg total) by mouth every 8 (eight) hours as needed.  -     carisoprodol (SOMA) 350 MG tablet; Take 1 tablet (350 mg total) by mouth 2 (two) times daily as needed for Muscle spasms.  Chronic bilateral low back pain without sciatica  -     lidocaine (LIDODERM) 5 %; Place onto the skin once daily.  Sprain of ligaments of lumbar spine, subsequent encounter  -     hydrocodone-acetaminophen 10-325mg (NORCO)  mg Tab; Take 1 tablet by mouth every 8 (eight) hours as needed for Pain.       Patient Instructions: Daily home exercises/warm soaks    Restrictions: Disabled until next office visit       Return Appointment: 4/3/2018 at 0930 AM

## 2018-03-06 NOTE — PROGRESS NOTES
Subjective:       Patient ID: Sasha Michelle is a 67 y.o. female.    Chief Complaint: Back Pain (FOLLOW UP FROM 03/15/01) and Hip Pain (FOLLOW UP FROM 03/15/01)    Pt is here for a follow up on a lower back and hip injury from 2001.  She reports that she fell last week when her BP dropped and that aggravated her injuries. CT      Back Pain   This is a chronic problem. The current episode started more than 1 year ago. The problem occurs intermittently. The problem has been waxing and waning since onset. The pain is present in the sacro-iliac and lumbar spine. The quality of the pain is described as aching. The pain radiates to the left thigh and right thigh. The pain is at a severity of 7/10. The pain is moderate. The pain is worse during the day. The symptoms are aggravated by sitting and twisting. Associated symptoms include weakness.   Hip Pain    The incident occurred more than 1 week ago. The incident occurred at work. The pain is present in the right hip and left hip. The quality of the pain is described as burning and stabbing. The pain is at a severity of 7/10. The pain is mild. The pain has been fluctuating since onset. She reports no foreign bodies present. The symptoms are aggravated by palpation and weight bearing. She has tried NSAIDs, non-weight bearing, rest, elevation and heat for the symptoms. The treatment provided mild relief.     Review of Systems   Constitution: Positive for weakness.   Musculoskeletal: Positive for back pain, muscle cramps, muscle weakness and stiffness.       Objective:      Physical Exam   Constitutional: She appears well-developed and well-nourished.   HENT:   Head: Normocephalic and atraumatic.   Eyes: EOM are normal. Pupils are equal, round, and reactive to light.   Neck: Normal range of motion. Neck supple.   Cardiovascular: Normal rate.    Pulmonary/Chest: Effort normal.   Musculoskeletal:        Cervical back: She exhibits decreased range of motion, tenderness and pain.  She exhibits no bony tenderness, no swelling, no edema, no deformity, no laceration, no spasm and normal pulse.        Lumbar back: She exhibits decreased range of motion, tenderness, pain and spasm. She exhibits no bony tenderness, no swelling, no edema, no deformity, no laceration and normal pulse.        Back:    Pain with palpation of low back with flexion to 90, extension to 25 and lateral bending to 25 degrees. No motor or sensory deficits.    Neurological: She is alert.   No focal neurologic deficits   Skin: Skin is warm.   Psychiatric: She has a normal mood and affect. Her behavior is normal. Judgment and thought content normal.   Nursing note and vitals reviewed.      Assessment:       1. Degeneration of lumbar intervertebral disc    2. Chronic bilateral low back pain without sciatica    3. Sprain of ligaments of lumbar spine, subsequent encounter        Plan:           Medications Ordered This Encounter      carisoprodol (SOMA) 350 MG tablet          Sig: Take 1 tablet (350 mg total) by mouth 2 (two) times daily as needed for Muscle spasms.          Dispense:  40 tablet          Refill:  0      hydrocodone-acetaminophen 10-325mg (NORCO)  mg Tab          Sig: Take 1 tablet by mouth every 8 (eight) hours as needed for Pain.          Dispense:  80 tablet          Refill:  0      ibuprofen (ADVIL,MOTRIN) 600 MG tablet          Sig: Take 1 tablet (600 mg total) by mouth every 8 (eight) hours as needed.          Dispense:  60 tablet          Refill:  0      lidocaine (LIDODERM) 5 %          Sig: Place onto the skin once daily.          Dispense:  30 patch          Refill:  0  Patient Instructions: Daily home exercises/warm soaks   Restrictions: Disabled until next office visit  Follow-up in about 4 weeks (around 4/3/2018).

## 2018-04-12 ENCOUNTER — OFFICE VISIT (OUTPATIENT)
Dept: OCCUPATIONAL MEDICINE | Facility: CLINIC | Age: 68
End: 2018-04-12
Payer: OTHER GOVERNMENT

## 2018-04-12 DIAGNOSIS — S33.5XXD SPRAIN OF LIGAMENTS OF LUMBAR SPINE, SUBSEQUENT ENCOUNTER: ICD-10-CM

## 2018-04-12 DIAGNOSIS — M51.36 DEGENERATION OF LUMBAR INTERVERTEBRAL DISC: Primary | ICD-10-CM

## 2018-04-12 DIAGNOSIS — G89.29 CHRONIC BILATERAL LOW BACK PAIN WITHOUT SCIATICA: ICD-10-CM

## 2018-04-12 DIAGNOSIS — M54.50 CHRONIC BILATERAL LOW BACK PAIN WITHOUT SCIATICA: ICD-10-CM

## 2018-04-12 PROCEDURE — 99213 OFFICE O/P EST LOW 20 MIN: CPT | Mod: S$GLB,,, | Performed by: PREVENTIVE MEDICINE

## 2018-04-12 RX ORDER — CARISOPRODOL 350 MG/1
350 TABLET ORAL 2 TIMES DAILY PRN
Qty: 40 TABLET | Refills: 0 | Status: SHIPPED | OUTPATIENT
Start: 2018-04-12 | End: 2018-05-10 | Stop reason: SDUPTHER

## 2018-04-12 RX ORDER — IBUPROFEN 600 MG/1
600 TABLET ORAL EVERY 8 HOURS PRN
Qty: 60 TABLET | Refills: 0 | Status: SHIPPED | OUTPATIENT
Start: 2018-04-12 | End: 2018-05-10 | Stop reason: SDUPTHER

## 2018-04-12 RX ORDER — LIDOCAINE 50 MG/G
PATCH TOPICAL DAILY
Qty: 30 PATCH | Refills: 0 | Status: SHIPPED | OUTPATIENT
Start: 2018-04-12 | End: 2018-07-05 | Stop reason: SDUPTHER

## 2018-04-12 RX ORDER — HYDROCODONE BITARTRATE AND ACETAMINOPHEN 10; 325 MG/1; MG/1
1 TABLET ORAL EVERY 8 HOURS PRN
Qty: 80 TABLET | Refills: 0 | Status: SHIPPED | OUTPATIENT
Start: 2018-04-12 | End: 2018-05-10 | Stop reason: SDUPTHER

## 2018-04-12 NOTE — PROGRESS NOTES
Subjective:       Patient ID: Sasha Michelle is a 67 y.o. female.    Chief Complaint: Back Pain    Pt is here for a follow up on a lower back and hip injury from 2001.  CT      Back Pain   This is a chronic problem. The current episode started more than 1 year ago. The problem occurs intermittently. The problem has been waxing and waning since onset. The pain is present in the sacro-iliac and lumbar spine. The quality of the pain is described as aching. The pain radiates to the left thigh and right thigh. The pain is at a severity of 7/10. The pain is moderate. The pain is worse during the day. The symptoms are aggravated by sitting and twisting. Associated symptoms include weakness.   Hip Pain    The incident occurred more than 1 week ago. The incident occurred at work. The pain is present in the right hip and left hip. The quality of the pain is described as burning and stabbing. The pain is at a severity of 7/10. The pain is mild. The pain has been fluctuating since onset. She reports no foreign bodies present. The symptoms are aggravated by palpation and weight bearing. She has tried NSAIDs, non-weight bearing, rest, elevation and heat for the symptoms. The treatment provided mild relief.     Review of Systems   Constitution: Positive for weakness.   Musculoskeletal: Positive for back pain, muscle cramps, muscle weakness and stiffness.       Objective:      Physical Exam   Constitutional: She appears well-developed and well-nourished.   HENT:   Head: Normocephalic and atraumatic.   Eyes: EOM are normal. Pupils are equal, round, and reactive to light.   Neck: Normal range of motion. Neck supple.   Cardiovascular: Normal rate.    Pulmonary/Chest: Effort normal.   Musculoskeletal:        Cervical back: She exhibits decreased range of motion, tenderness and pain. She exhibits no bony tenderness, no swelling, no edema, no deformity, no laceration, no spasm and normal pulse.        Lumbar back: She exhibits decreased  range of motion, tenderness, pain and spasm. She exhibits no bony tenderness, no swelling, no edema, no deformity, no laceration and normal pulse.        Back:    Pain with palpation of low back with flexion to 90, extension to 25 and lateral bending to 25 degrees. No motor or sensory deficits.    Neurological: She is alert.   No focal neurologic deficits   Skin: Skin is warm.   Psychiatric: She has a normal mood and affect. Her behavior is normal. Judgment and thought content normal.   Nursing note and vitals reviewed.      Assessment:       1. Degeneration of lumbar intervertebral disc    2. Chronic bilateral low back pain without sciatica    3. Sprain of ligaments of lumbar spine, subsequent encounter        Plan:           Medications Ordered This Encounter      carisoprodol (SOMA) 350 MG tablet          Sig: Take 1 tablet (350 mg total) by mouth 2 (two) times daily as needed for Muscle spasms.          Dispense:  40 tablet          Refill:  0      hydrocodone-acetaminophen 10-325mg (NORCO)  mg Tab          Sig: Take 1 tablet by mouth every 8 (eight) hours as needed for Pain.          Dispense:  80 tablet          Refill:  0      ibuprofen (ADVIL,MOTRIN) 600 MG tablet          Sig: Take 1 tablet (600 mg total) by mouth every 8 (eight) hours as needed.          Dispense:  60 tablet          Refill:  0      lidocaine (LIDODERM) 5 %          Sig: Place onto the skin once daily.          Dispense:  30 patch          Refill:  0  Patient Instructions: Daily home exercises/warm soaks, Use splint as directed (Back Belt applied)   Restrictions: Disabled until next office visit  Follow-up in about 4 weeks (around 5/10/2018).

## 2018-04-12 NOTE — LETTER
[facility] [place] [address]  [place] [address]  Phone: [phone]  Fax: [phone]    Pt Name: Sasha Michelle  Injury Date: 03/15/2001   Employee ID:  Date of Treatment: 04/12/2018   Company: [employer]            Appointment Time: 09:30 AM Arrived:  9:45 AM CDT   Appointment Date: [unfilled] Time Out:1210   Physician: Miguel Staples MD        Office Treatment: Sasha was seen today for back pain.    Diagnoses and all orders for this visit:  EXAM  BACK BRACE  DISABLED UNTIL NEXT OFFICE VISIT    Degeneration of lumbar intervertebral disc  -     ibuprofen (ADVIL,MOTRIN) 600 MG tablet; Take 1 tablet (600 mg total) by mouth every 8 (eight) hours as needed.  -     carisoprodol (SOMA) 350 MG tablet; Take 1 tablet (350 mg total) by mouth 2 (two) times daily as needed for Muscle spasms.  Chronic bilateral low back pain without sciatica  -     lidocaine (LIDODERM) 5 %; Place onto the skin once daily.  Sprain of ligaments of lumbar spine, subsequent encounter  -     hydrocodone-acetaminophen 10-325mg (NORCO)  mg Tab; Take 1 tablet by mouth every 8 (eight) hours as needed for Pain.     Patient Instructions: Daily home exercises/warm soaks, Use splint as directed (Back Belt applied)    Restrictions: Disabled until next office visit       Return Appointment: 5/10/2018 at 0845 AM

## 2018-05-07 ENCOUNTER — OFFICE VISIT (OUTPATIENT)
Dept: OBSTETRICS AND GYNECOLOGY | Facility: CLINIC | Age: 68
End: 2018-05-07
Attending: OBSTETRICS & GYNECOLOGY
Payer: MEDICARE

## 2018-05-07 VITALS
DIASTOLIC BLOOD PRESSURE: 80 MMHG | WEIGHT: 142.88 LBS | HEIGHT: 62 IN | BODY MASS INDEX: 26.29 KG/M2 | SYSTOLIC BLOOD PRESSURE: 122 MMHG

## 2018-05-07 DIAGNOSIS — Z01.419 WELL WOMAN EXAM: Primary | ICD-10-CM

## 2018-05-07 DIAGNOSIS — N93.0 PCB (POST COITAL BLEEDING): ICD-10-CM

## 2018-05-07 PROCEDURE — 99999 PR PBB SHADOW E&M-EST. PATIENT-LVL III: CPT | Mod: PBBFAC,,, | Performed by: OBSTETRICS & GYNECOLOGY

## 2018-05-07 PROCEDURE — 99213 OFFICE O/P EST LOW 20 MIN: CPT | Mod: PBBFAC | Performed by: OBSTETRICS & GYNECOLOGY

## 2018-05-07 PROCEDURE — G0101 CA SCREEN;PELVIC/BREAST EXAM: HCPCS | Mod: PBBFAC | Performed by: OBSTETRICS & GYNECOLOGY

## 2018-05-07 PROCEDURE — G0101 CA SCREEN;PELVIC/BREAST EXAM: HCPCS | Mod: S$PBB,,, | Performed by: OBSTETRICS & GYNECOLOGY

## 2018-05-07 RX ORDER — CHLORTHALIDONE 25 MG/1
1 TABLET ORAL
COMMUNITY
Start: 2018-02-09 | End: 2021-02-11

## 2018-05-07 RX ORDER — ESTRADIOL 0.1 MG/G
CREAM VAGINAL
Qty: 42.5 G | Refills: 11 | Status: SHIPPED | OUTPATIENT
Start: 2018-05-07 | End: 2019-10-06 | Stop reason: SDUPTHER

## 2018-05-07 NOTE — PROGRESS NOTES
"CC: Well woman exam    Sasha Michelle is a 67 y.o. female  status post hyst/uso presents for a well woman exam.  No current issues, problems, or complaints. Vaginal estrace helps with dryness and dyspareunia.  Uses it 1 hour prior to intercourse, about once per week.    Past Medical History:   Diagnosis Date    Hypertension     Vertigo 2017     Past Surgical History:   Procedure Laterality Date    APPENDECTOMY      Breast Implants      CHOLECYSTECTOMY      HAND SURGERY      HYSTERECTOMY      OOPHORECTOMY      1 ovary remains     Family History   Problem Relation Age of Onset    Breast cancer Mother     Cancer Mother     Cancer Maternal Grandmother     Cancer Maternal Grandfather      Social History   Substance Use Topics    Smoking status: Former Smoker    Smokeless tobacco: Never Used    Alcohol use Not on file      Comment: occ     OB History      Para Term  AB Living    1 1            SAB TAB Ectopic Multiple Live Births                       /80   Ht 5' 2" (1.575 m)   Wt 64.8 kg (142 lb 13.7 oz)   BMI 26.13 kg/m²     ROS:    GENERAL: Denies weight gain or weight loss. Feeling well overall.   SKIN: Denies rash or lesions.   HEAD: Denies head injury or headache.   NODES: Denies enlarged lymph nodes.   CHEST: Denies chest pain or shortness of breath.   CARDIOVASCULAR: Denies palpitations or left sided chest pain.   ABDOMEN: No abdominal pain, constipation, diarrhea, nausea, vomiting or rectal bleeding.   URINARY: No frequency, dysuria, hematuria, or burning on urination.  REPRODUCTIVE: See HPI.   BREASTS: The patient performs breast self-examination and denies pain, lumps, or nipple discharge.   HEMATOLOGIC: No easy bruisability or excessive bleeding.   MUSCULOSKELETAL: Denies joint pain or swelling.   NEUROLOGIC: Denies syncope or weakness.   PSYCHIATRIC: Denies depression, anxiety or mood swings.      PHYSICAL EXAM:     APPEARANCE: Well nourished, well developed, in " no acute distress.  AFFECT: WNL, alert and oriented x 3.  SKIN: No acne or hirsutism.  NECK: Neck symmetric without masses or thyromegaly.  NODES: No inguinal, cervical, axillary or femoral lymph node enlargement.  CHEST: Good respiratory effort.   ABDOMEN: Soft. No tenderness or masses. No hepatosplenomegaly. No hernias.  BREASTS: Symmetrical, no skin changes or visible lesions. No palpable masses, nipple discharge bilaterally.  PELVIC: Normal external female genitalia without lesions. Normal hair distribution. Adequate perineal body, normal urethral meatus. Vagina atrophic without lesions or discharge. No significant cystocele or rectocele. Bimanual exam shows uterus and cervix to be surgically absent. Adnexa without masses or tenderness.  EXTREMITIES: No edema.    ASSESSMENT    ICD-10-CM ICD-9-CM    1. Well woman exam Z01.419 V72.31    2. PCB (post coital bleeding) N93.0 626.7 estradiol (ESTRACE) 0.01 % (0.1 mg/gram) vaginal cream        Discussed using estrace twice weekly at bedtime.  Patient was counseled today on A.C.S. Pap guidelines and recommendations for yearly pelvic exams, mammograms and monthly self breast exams; to see her PCP for other health maintenance.

## 2018-05-10 ENCOUNTER — OFFICE VISIT (OUTPATIENT)
Dept: OCCUPATIONAL MEDICINE | Facility: CLINIC | Age: 68
End: 2018-05-10
Payer: OTHER GOVERNMENT

## 2018-05-10 DIAGNOSIS — G89.29 CHRONIC BILATERAL LOW BACK PAIN WITHOUT SCIATICA: ICD-10-CM

## 2018-05-10 DIAGNOSIS — M54.50 CHRONIC BILATERAL LOW BACK PAIN WITHOUT SCIATICA: ICD-10-CM

## 2018-05-10 DIAGNOSIS — S33.5XXD SPRAIN OF LIGAMENTS OF LUMBAR SPINE, SUBSEQUENT ENCOUNTER: ICD-10-CM

## 2018-05-10 DIAGNOSIS — M51.36 DEGENERATION OF LUMBAR INTERVERTEBRAL DISC: Primary | ICD-10-CM

## 2018-05-10 PROCEDURE — 99213 OFFICE O/P EST LOW 20 MIN: CPT | Mod: S$GLB,,, | Performed by: PREVENTIVE MEDICINE

## 2018-05-10 RX ORDER — IBUPROFEN 600 MG/1
600 TABLET ORAL EVERY 8 HOURS PRN
Qty: 60 TABLET | Refills: 0 | Status: SHIPPED | OUTPATIENT
Start: 2018-05-10 | End: 2018-06-07 | Stop reason: SDUPTHER

## 2018-05-10 RX ORDER — HYDROCODONE BITARTRATE AND ACETAMINOPHEN 10; 325 MG/1; MG/1
1 TABLET ORAL EVERY 8 HOURS PRN
Qty: 80 TABLET | Refills: 0 | Status: SHIPPED | OUTPATIENT
Start: 2018-05-10 | End: 2018-06-07 | Stop reason: SDUPTHER

## 2018-05-10 RX ORDER — CARISOPRODOL 350 MG/1
350 TABLET ORAL 2 TIMES DAILY PRN
Qty: 20 TABLET | Refills: 0 | Status: SHIPPED | OUTPATIENT
Start: 2018-05-10 | End: 2018-06-07 | Stop reason: SDUPTHER

## 2018-05-10 NOTE — PROGRESS NOTES
Subjective:       Patient ID: Sasha Michelle is a 67 y.o. female.    Chief Complaint: Back Pain (3/15/01) and Hip Pain (RIGHT AND LEFT)    Pt is here for a follow up on a lower back and hip injury from 2001.  IJ      Back Pain   This is a chronic problem. The current episode started more than 1 year ago. The problem occurs intermittently. The problem has been waxing and waning since onset. The pain is present in the sacro-iliac and lumbar spine. The quality of the pain is described as aching. The pain radiates to the left thigh and right thigh. The pain is at a severity of 7/10. The pain is moderate. The pain is worse during the day. The symptoms are aggravated by sitting and twisting. Associated symptoms include weakness. Pertinent negatives include no abdominal pain, bladder incontinence, bowel incontinence, chest pain, dysuria, fever, headaches or numbness.   Hip Pain    The incident occurred more than 1 week ago. The incident occurred at work. The pain is present in the right hip and left hip. The quality of the pain is described as burning and stabbing. The pain is at a severity of 7/10. The pain is mild. The pain has been fluctuating since onset. Pertinent negatives include no numbness. She reports no foreign bodies present. The symptoms are aggravated by palpation and weight bearing. She has tried NSAIDs, non-weight bearing, rest, elevation and heat for the symptoms. The treatment provided mild relief.     Review of Systems   Constitution: Positive for weakness. Negative for chills, fever and malaise/fatigue.   HENT: Negative for sore throat.    Eyes: Negative for blurred vision.   Cardiovascular: Negative for chest pain.   Respiratory: Negative for shortness of breath.    Skin: Negative for color change and rash.   Musculoskeletal: Positive for back pain, muscle cramps, muscle weakness and stiffness. Negative for joint pain.   Gastrointestinal: Negative for abdominal pain, bowel incontinence, diarrhea,  nausea and vomiting.   Genitourinary: Negative for bladder incontinence, dysuria, hematuria and urgency.   Neurological: Negative for disturbances in coordination, headaches and numbness.   Psychiatric/Behavioral: The patient is not nervous/anxious.        Objective:      Physical Exam   Constitutional: She appears well-developed and well-nourished.   HENT:   Head: Normocephalic and atraumatic.   Eyes: EOM are normal. Pupils are equal, round, and reactive to light.   Neck: Normal range of motion. Neck supple.   Cardiovascular: Normal rate.    Pulmonary/Chest: Effort normal.   Musculoskeletal:        Cervical back: She exhibits decreased range of motion, tenderness and pain. She exhibits no bony tenderness, no swelling, no edema, no deformity, no laceration, no spasm and normal pulse.        Lumbar back: She exhibits decreased range of motion, tenderness, pain and spasm. She exhibits no bony tenderness, no swelling, no edema, no deformity, no laceration and normal pulse.        Back:    Pain with palpation of low back with flexion to 90, extension to 25 and lateral bending to 25 degrees. No motor or sensory deficits.    Neurological: She is alert.   No focal neurologic deficits   Skin: Skin is warm.   Psychiatric: She has a normal mood and affect. Her behavior is normal. Judgment and thought content normal.   Nursing note and vitals reviewed.      Assessment:       1. Degeneration of lumbar intervertebral disc    2. Chronic bilateral low back pain without sciatica    3. Sprain of ligaments of lumbar spine, subsequent encounter        Plan:           Medications Ordered This Encounter      carisoprodol (SOMA) 350 MG tablet          Sig: Take 1 tablet (350 mg total) by mouth 2 (two) times daily as needed for Muscle spasms.          Dispense:  20 tablet          Refill:  0      hydrocodone-acetaminophen 10-325mg (NORCO)  mg Tab          Sig: Take 1 tablet by mouth every 8 (eight) hours as needed for Pain.           Dispense:  80 tablet          Refill:  0      ibuprofen (ADVIL,MOTRIN) 600 MG tablet          Sig: Take 1 tablet (600 mg total) by mouth every 8 (eight) hours as needed.          Dispense:  60 tablet          Refill:  0  Patient Instructions: Daily home exercises/warm soaks, Use splint as directed   Restrictions: Disabled until next office visit  Follow-up in about 4 weeks (around 6/7/2018).

## 2018-05-10 NOTE — LETTER
[facility] [place]  [facility]  [place] [address]  Phone: [phone]  Fax: [phone]    Pt Name: Sasha Michelle  Injury Date: 03/15/2001   Employee ID:  Date of Treatment: 05/10/2018   Company: [employer]            Appointment Time: 08:30 AM Arrived:  8:45 AM CDT   Appointment Date: [unfilled] Time Out:1030 AM   Physician: Miguel Staples MD        Office Treatment: Sasha was seen today for back pain and hip pain.    Diagnoses and all orders for this visit:  EXAM  RX REFILLS  DISABLED UNTIL NEXT OFFICE VISIT    Degeneration of lumbar intervertebral disc  -     ibuprofen (ADVIL,MOTRIN) 600 MG tablet; Take 1 tablet (600 mg total) by mouth every 8 (eight) hours as needed.  -     carisoprodol (SOMA) 350 MG tablet; Take 1 tablet (350 mg total) by mouth 2 (two) times daily as needed for Muscle spasms.  Chronic bilateral low back pain without sciatica  Sprain of ligaments of lumbar spine, subsequent encounter  -     hydrocodone-acetaminophen 10-325mg (NORCO)  mg Tab; Take 1 tablet by mouth every 8 (eight) hours as needed for Pain.     Patient Instructions: Daily home exercises/warm soaks, Use splint as directed    Restrictions: Disabled until next office visit       Return Appointment: 6/7/2018 at 0930

## 2018-06-07 ENCOUNTER — OFFICE VISIT (OUTPATIENT)
Dept: OCCUPATIONAL MEDICINE | Facility: CLINIC | Age: 68
End: 2018-06-07
Payer: OTHER GOVERNMENT

## 2018-06-07 DIAGNOSIS — G89.29 CHRONIC BILATERAL LOW BACK PAIN WITHOUT SCIATICA: ICD-10-CM

## 2018-06-07 DIAGNOSIS — S33.5XXD SPRAIN OF LIGAMENTS OF LUMBAR SPINE, SUBSEQUENT ENCOUNTER: ICD-10-CM

## 2018-06-07 DIAGNOSIS — M54.50 CHRONIC BILATERAL LOW BACK PAIN WITHOUT SCIATICA: ICD-10-CM

## 2018-06-07 DIAGNOSIS — M51.36 DEGENERATION OF LUMBAR INTERVERTEBRAL DISC: Primary | ICD-10-CM

## 2018-06-07 PROCEDURE — 99214 OFFICE O/P EST MOD 30 MIN: CPT | Mod: S$GLB,,, | Performed by: PREVENTIVE MEDICINE

## 2018-06-07 RX ORDER — HYDROCODONE BITARTRATE AND ACETAMINOPHEN 10; 325 MG/1; MG/1
1 TABLET ORAL EVERY 8 HOURS PRN
Qty: 80 TABLET | Refills: 0 | Status: SHIPPED | OUTPATIENT
Start: 2018-06-07 | End: 2018-07-05 | Stop reason: SDUPTHER

## 2018-06-07 RX ORDER — CARISOPRODOL 350 MG/1
350 TABLET ORAL 2 TIMES DAILY PRN
Qty: 20 TABLET | Refills: 0 | Status: SHIPPED | OUTPATIENT
Start: 2018-06-07 | End: 2018-07-05 | Stop reason: SDUPTHER

## 2018-06-07 RX ORDER — IBUPROFEN 600 MG/1
600 TABLET ORAL EVERY 8 HOURS PRN
Qty: 60 TABLET | Refills: 0 | Status: SHIPPED | OUTPATIENT
Start: 2018-06-07 | End: 2018-07-05 | Stop reason: ALTCHOICE

## 2018-06-07 NOTE — PROGRESS NOTES
Subjective:       Patient ID: Sasha Michelle is a 67 y.o. female.    Chief Complaint: Back Pain    Pt is here for a follow up on a lower back and hip injury from 2001.      Back Pain   This is a chronic problem. The current episode started more than 1 year ago. The problem occurs intermittently. The problem has been waxing and waning since onset. The pain is present in the sacro-iliac and lumbar spine. The quality of the pain is described as aching. The pain radiates to the left thigh and right thigh. The pain is at a severity of 7/10. The pain is moderate. The pain is worse during the day. The symptoms are aggravated by sitting and twisting. Associated symptoms include weakness. Pertinent negatives include no abdominal pain, bladder incontinence, bowel incontinence, chest pain, dysuria, fever, headaches or numbness.   Hip Pain    The incident occurred more than 1 week ago. The incident occurred at work. The pain is present in the right hip and left hip. The quality of the pain is described as burning and stabbing. The pain is at a severity of 7/10. The pain is mild. The pain has been fluctuating since onset. Pertinent negatives include no numbness. She reports no foreign bodies present. The symptoms are aggravated by palpation and weight bearing. She has tried NSAIDs, non-weight bearing, rest, elevation and heat for the symptoms. The treatment provided mild relief.     Review of Systems   Constitution: Positive for weakness. Negative for chills, fever and malaise/fatigue.   HENT: Negative for sore throat.    Eyes: Negative for blurred vision.   Cardiovascular: Negative for chest pain.   Respiratory: Negative for shortness of breath.    Skin: Negative for color change and rash.   Musculoskeletal: Positive for back pain, muscle cramps, muscle weakness and stiffness. Negative for joint pain.   Gastrointestinal: Negative for abdominal pain, bowel incontinence, diarrhea, nausea and vomiting.   Genitourinary: Negative  for bladder incontinence, dysuria, hematuria and urgency.   Neurological: Negative for disturbances in coordination, headaches and numbness.   Psychiatric/Behavioral: The patient is not nervous/anxious.        Objective:      Physical Exam   Constitutional: She appears well-developed and well-nourished.   HENT:   Head: Normocephalic and atraumatic.   Eyes: EOM are normal. Pupils are equal, round, and reactive to light.   Neck: Normal range of motion. Neck supple.   Cardiovascular: Normal rate.    Pulmonary/Chest: Effort normal.   Musculoskeletal:        Cervical back: She exhibits decreased range of motion, tenderness and pain. She exhibits no bony tenderness, no swelling, no edema, no deformity, no laceration, no spasm and normal pulse.        Lumbar back: She exhibits decreased range of motion, tenderness, pain and spasm. She exhibits no bony tenderness, no swelling, no edema, no deformity, no laceration and normal pulse.        Back:    Pain with palpation of low back with flexion to 90, extension to 25 and lateral bending to 25 degrees. No motor or sensory deficits.    Neurological: She is alert.   No focal neurologic deficits   Skin: Skin is warm.   Psychiatric: She has a normal mood and affect. Her behavior is normal. Judgment and thought content normal.   Nursing note and vitals reviewed.      Assessment:       1. Degeneration of lumbar intervertebral disc    2. Chronic bilateral low back pain without sciatica    3. Sprain of ligaments of lumbar spine, subsequent encounter        Plan:           Medications Ordered This Encounter      carisoprodol (SOMA) 350 MG tablet          Sig: Take 1 tablet (350 mg total) by mouth 2 (two) times daily as needed for Muscle spasms.          Dispense:  20 tablet          Refill:  0      HYDROcodone-acetaminophen (NORCO)  mg per tablet          Sig: Take 1 tablet by mouth every 8 (eight) hours as needed for Pain.          Dispense:  80 tablet          Refill:  0       ibuprofen (ADVIL,MOTRIN) 600 MG tablet          Sig: Take 1 tablet (600 mg total) by mouth every 8 (eight) hours as needed.          Dispense:  60 tablet          Refill:  0  Patient Instructions: Daily home exercises/warm soaks   Restrictions: Disabled until next office visit  Follow-up in about 4 weeks (around 7/5/2018).

## 2018-06-07 NOTE — LETTER
[facility]  [address] [address]  [place] [address]  Phone: [phone]  Fax: [phone]    Pt Name: Sasha Michelle  Injury Date: 03/15/2001   Employee ID:  Date of Treatment: 06/07/2018   Company: [employer]            Appointment Time: 09:15 AM Arrived:  9:30 AM CDT   Appointment Date: [unfilled] Time Out:1030 AM   Physician: Miguel Staples MD        Office Treatment: Sasha was seen today for back pain.    Diagnoses and all orders for this visit:  EXAM  DISABLED UNTIL NEXT OFFICE VISIT    Degeneration of lumbar intervertebral disc  -     ibuprofen (ADVIL,MOTRIN) 600 MG tablet; Take 1 tablet (600 mg total) by mouth every 8 (eight) hours as needed.  -     carisoprodol (SOMA) 350 MG tablet; Take 1 tablet (350 mg total) by mouth 2 (two) times daily as needed for Muscle spasms.  Chronic bilateral low back pain without sciatica  Sprain of ligaments of lumbar spine, subsequent encounter  -     HYDROcodone-acetaminophen (NORCO)  mg per tablet; Take 1 tablet by mouth every 8 (eight) hours as needed for Pain.       Patient Instructions: Daily home exercises/warm soaks    Restrictions: Disabled until next office visit       Return Appointment: 07/05/18 @ 0845 AM

## 2018-07-05 ENCOUNTER — OFFICE VISIT (OUTPATIENT)
Dept: OCCUPATIONAL MEDICINE | Facility: CLINIC | Age: 68
End: 2018-07-05
Payer: OTHER GOVERNMENT

## 2018-07-05 DIAGNOSIS — G89.29 CHRONIC BILATERAL LOW BACK PAIN WITHOUT SCIATICA: ICD-10-CM

## 2018-07-05 DIAGNOSIS — M54.50 CHRONIC BILATERAL LOW BACK PAIN WITHOUT SCIATICA: ICD-10-CM

## 2018-07-05 DIAGNOSIS — S33.5XXD SPRAIN OF LIGAMENTS OF LUMBAR SPINE, SUBSEQUENT ENCOUNTER: ICD-10-CM

## 2018-07-05 DIAGNOSIS — M51.36 DEGENERATION OF LUMBAR INTERVERTEBRAL DISC: Primary | ICD-10-CM

## 2018-07-05 PROCEDURE — 99214 OFFICE O/P EST MOD 30 MIN: CPT | Mod: S$GLB,,, | Performed by: PREVENTIVE MEDICINE

## 2018-07-05 RX ORDER — LIDOCAINE 50 MG/G
PATCH TOPICAL DAILY
Qty: 30 PATCH | Refills: 0 | Status: SHIPPED | OUTPATIENT
Start: 2018-07-05 | End: 2018-09-25 | Stop reason: SDUPTHER

## 2018-07-05 RX ORDER — CARISOPRODOL 350 MG/1
350 TABLET ORAL 2 TIMES DAILY PRN
Qty: 20 TABLET | Refills: 0 | Status: SHIPPED | OUTPATIENT
Start: 2018-07-05 | End: 2019-08-08 | Stop reason: ALTCHOICE

## 2018-07-05 RX ORDER — HYDROCODONE BITARTRATE AND ACETAMINOPHEN 10; 325 MG/1; MG/1
1 TABLET ORAL EVERY 8 HOURS PRN
Qty: 80 TABLET | Refills: 0 | Status: SHIPPED | OUTPATIENT
Start: 2018-07-05 | End: 2018-08-28 | Stop reason: SDUPTHER

## 2018-07-05 RX ORDER — IBUPROFEN 800 MG/1
800 TABLET ORAL 3 TIMES DAILY
Qty: 30 TABLET | Refills: 0 | Status: SHIPPED | OUTPATIENT
Start: 2018-07-05 | End: 2018-08-28 | Stop reason: SDUPTHER

## 2018-07-05 NOTE — PROGRESS NOTES
Subjective:       Patient ID: Sasha Michelle is a 67 y.o. female.    Chief Complaint: Back Pain and Hip Pain (LEFT AND RIGHT)    Pt is here for a follow up on a lower back and hip injury from 2001. Pt states her injuries have improved since her last visit. Pt also states she is out off all her medication. IJ      Back Pain   This is a chronic problem. The current episode started more than 1 year ago. The problem occurs intermittently. The problem has been waxing and waning since onset. The pain is present in the sacro-iliac and lumbar spine. The quality of the pain is described as aching. The pain radiates to the left thigh and right thigh. The pain is at a severity of 6/10. The pain is moderate. The pain is worse during the day. The symptoms are aggravated by sitting and twisting. Associated symptoms include weakness. Pertinent negatives include no abdominal pain, bladder incontinence, bowel incontinence, chest pain, dysuria, fever, headaches or numbness. She has tried NSAIDs for the symptoms. The treatment provided mild relief.   Hip Pain    The incident occurred more than 1 week ago. The incident occurred at work. The pain is present in the right hip and left hip. The quality of the pain is described as burning and stabbing. The pain is at a severity of 6/10. The pain is mild. The pain has been fluctuating since onset. Pertinent negatives include no numbness. She reports no foreign bodies present. The symptoms are aggravated by palpation and weight bearing. She has tried NSAIDs, non-weight bearing, rest, elevation and heat for the symptoms. The treatment provided mild relief.     Review of Systems   Constitution: Positive for weakness. Negative for chills, fever and malaise/fatigue.   HENT: Negative for sore throat.    Eyes: Negative for blurred vision.   Cardiovascular: Negative for chest pain.   Respiratory: Negative for shortness of breath.    Skin: Negative for color change and rash.   Musculoskeletal:  Positive for back pain, muscle cramps and muscle weakness. Negative for joint pain.   Gastrointestinal: Negative for abdominal pain, bowel incontinence, diarrhea, nausea and vomiting.   Genitourinary: Negative for bladder incontinence, dysuria, hematuria and urgency.   Neurological: Negative for disturbances in coordination, headaches and numbness.   Psychiatric/Behavioral: The patient is not nervous/anxious.        Objective:      Physical Exam   Constitutional: She appears well-developed and well-nourished.   HENT:   Head: Normocephalic and atraumatic.   Eyes: EOM are normal. Pupils are equal, round, and reactive to light.   Neck: Normal range of motion. Neck supple.   Cardiovascular: Normal rate.    Pulmonary/Chest: Effort normal.   Musculoskeletal:        Cervical back: She exhibits decreased range of motion, tenderness and pain. She exhibits no bony tenderness, no swelling, no edema, no deformity, no laceration, no spasm and normal pulse.        Lumbar back: She exhibits decreased range of motion, tenderness, pain and spasm. She exhibits no bony tenderness, no swelling, no edema, no deformity, no laceration and normal pulse.        Back:    Pain with palpation of low back without spasm. Pain  with flexion to 90, extension to 25 and lateral bending to 25 degrees. No motor or sensory deficits.   Patient also has pain about upper back and neck with palpation and range of motion testing. Pain with flexion of neck to 25 degrees, extension to 10 degrees and later bending to 25 degrees.   Neurological: She is alert.   No focal neurologic deficits   Skin: Skin is warm.   Psychiatric: She has a normal mood and affect. Her behavior is normal. Judgment and thought content normal.   Nursing note and vitals reviewed.      Assessment:       1. Degeneration of lumbar intervertebral disc    2. Chronic bilateral low back pain without sciatica    3. Sprain of ligaments of lumbar spine, subsequent encounter        Plan:            Medications Ordered This Encounter      carisoprodol (SOMA) 350 MG tablet          Sig: Take 1 tablet (350 mg total) by mouth 2 (two) times daily as needed for Muscle spasms.          Dispense:  20 tablet          Refill:  0      HYDROcodone-acetaminophen (NORCO)  mg per tablet          Sig: Take 1 tablet by mouth every 8 (eight) hours as needed for Pain.          Dispense:  80 tablet          Refill:  0      ibuprofen (ADVIL,MOTRIN) 800 MG tablet          Sig: Take 1 tablet (800 mg total) by mouth 3 (three) times daily.          Dispense:  30 tablet          Refill:  0      lidocaine (LIDODERM) 5 %          Sig: Place onto the skin once daily.          Dispense:  30 patch          Refill:  0  Patient Instructions: Daily home exercises/warm soaks   Restrictions: Disabled until next office visit  Follow-up in about 4 weeks (around 8/2/2018).

## 2018-07-05 NOTE — LETTER
[facility]  [address] [place] [address]  Phone: [phone]  Fax: [phone]    Pt Name: Sasha Michelle  Injury Date: 03/15/2001   Employee ID:  Date of Treatment: 07/05/2018   Company: [employer]            Appointment Time: 08:30 AM Arrived:  8:45 AM CDT   Appointment Date: 7/5/2018 Time Out: 10:45 AM   Physician: Miguel Staples MD        Office Treatment: Sasha was seen today for back pain and hip pain.    Diagnoses and all orders for this visit:  EXAM  RX GIVEN   DISABLED UNTIL NEXT OFFICE VISIT    Degeneration of lumbar intervertebral disc    Chronic bilateral low back pain without sciatica    Sprain of ligaments of lumbar spine, subsequent encounter     Patient Instructions: Daily home exercises/warm soaks    Restrictions: Disabled until next office visit       Return Appointment: 8/2/2018 @ 9:00 AM

## 2018-08-28 ENCOUNTER — OFFICE VISIT (OUTPATIENT)
Dept: OCCUPATIONAL MEDICINE | Facility: CLINIC | Age: 68
End: 2018-08-28
Payer: OTHER GOVERNMENT

## 2018-08-28 DIAGNOSIS — G89.29 CHRONIC BILATERAL LOW BACK PAIN WITHOUT SCIATICA: ICD-10-CM

## 2018-08-28 DIAGNOSIS — M54.50 CHRONIC BILATERAL LOW BACK PAIN WITHOUT SCIATICA: ICD-10-CM

## 2018-08-28 DIAGNOSIS — M51.36 DEGENERATION OF LUMBAR INTERVERTEBRAL DISC: Primary | ICD-10-CM

## 2018-08-28 DIAGNOSIS — S33.5XXD SPRAIN OF LIGAMENTS OF LUMBAR SPINE, SUBSEQUENT ENCOUNTER: ICD-10-CM

## 2018-08-28 PROCEDURE — 99214 OFFICE O/P EST MOD 30 MIN: CPT | Mod: S$GLB,,, | Performed by: PREVENTIVE MEDICINE

## 2018-08-28 RX ORDER — CYCLOBENZAPRINE HCL 10 MG
10 TABLET ORAL NIGHTLY
Qty: 60 TABLET | Refills: 1 | Status: SHIPPED | OUTPATIENT
Start: 2018-08-28 | End: 2018-09-25 | Stop reason: SDUPTHER

## 2018-08-28 RX ORDER — IBUPROFEN 800 MG/1
800 TABLET ORAL 3 TIMES DAILY
Qty: 30 TABLET | Refills: 0 | Status: SHIPPED | OUTPATIENT
Start: 2018-08-28 | End: 2018-09-25 | Stop reason: SDUPTHER

## 2018-08-28 RX ORDER — HYDROCODONE BITARTRATE AND ACETAMINOPHEN 10; 325 MG/1; MG/1
1 TABLET ORAL EVERY 8 HOURS PRN
Qty: 80 TABLET | Refills: 0 | Status: SHIPPED | OUTPATIENT
Start: 2018-08-28 | End: 2018-09-25 | Stop reason: SDUPTHER

## 2018-08-28 NOTE — PROGRESS NOTES
Subjective:       Patient ID: Sasha Michelle is a 67 y.o. female.    Chief Complaint: Back Pain    Pt is here for a follow up on a lower back and hip injury from 2001.       Back Pain   This is a chronic problem. The current episode started more than 1 year ago. The problem occurs intermittently. The problem has been waxing and waning since onset. The pain is present in the sacro-iliac and lumbar spine. The quality of the pain is described as aching. The pain radiates to the left thigh and right thigh. The pain is at a severity of 6/10. The pain is moderate. The pain is worse during the day. The symptoms are aggravated by sitting and twisting. Associated symptoms include weakness. Pertinent negatives include no abdominal pain, bladder incontinence, bowel incontinence, chest pain, dysuria, fever, headaches or numbness. She has tried NSAIDs for the symptoms. The treatment provided mild relief.   Hip Pain    The incident occurred more than 1 week ago. The incident occurred at work. The pain is present in the right hip and left hip. The quality of the pain is described as burning and stabbing. The pain is at a severity of 6/10. The pain is mild. The pain has been fluctuating since onset. Pertinent negatives include no numbness. She reports no foreign bodies present. The symptoms are aggravated by palpation and weight bearing. She has tried NSAIDs, non-weight bearing, rest, elevation and heat for the symptoms. The treatment provided mild relief.     Review of Systems   Constitution: Positive for weakness. Negative for chills, fever and malaise/fatigue.   HENT: Negative for sore throat.    Eyes: Negative for blurred vision.   Cardiovascular: Negative for chest pain.   Respiratory: Negative for shortness of breath.    Skin: Negative for color change and rash.   Musculoskeletal: Positive for back pain, muscle cramps and muscle weakness. Negative for joint pain.   Gastrointestinal: Negative for abdominal pain, bowel  incontinence, diarrhea, nausea and vomiting.   Genitourinary: Negative for bladder incontinence, dysuria, hematuria and urgency.   Neurological: Negative for disturbances in coordination, headaches and numbness.   Psychiatric/Behavioral: The patient is not nervous/anxious.        Objective:      Physical Exam   Constitutional: She appears well-developed and well-nourished.   HENT:   Head: Normocephalic and atraumatic.   Eyes: EOM are normal. Pupils are equal, round, and reactive to light.   Neck: Normal range of motion. Neck supple.   Cardiovascular: Normal rate.   Pulmonary/Chest: Effort normal.   Musculoskeletal:        Cervical back: She exhibits decreased range of motion, tenderness and pain. She exhibits no bony tenderness, no swelling, no edema, no deformity, no laceration, no spasm and normal pulse.        Lumbar back: She exhibits decreased range of motion, tenderness, pain and spasm. She exhibits no bony tenderness, no swelling, no edema, no deformity, no laceration and normal pulse.        Back:    Pain with palpation of low back without spasm. Pain  with flexion to 90, extension to 25 and lateral bending to 25 degrees. No motor or sensory deficits.   Patient also has pain about upper back and neck with palpation and range of motion testing. Pain with flexion of neck to 25 degrees, extension to 10 degrees and later bending to 25 degrees.   Neurological: She is alert.   No focal neurologic deficits   Skin: Skin is warm.   Psychiatric: She has a normal mood and affect. Her behavior is normal. Judgment and thought content normal.   Nursing note and vitals reviewed.      Assessment:       1. Degeneration of lumbar intervertebral disc    2. Chronic bilateral low back pain without sciatica    3. Sprain of ligaments of lumbar spine, subsequent encounter        Plan:         Medications Ordered This Encounter   Medications    cyclobenzaprine (FLEXERIL) 10 MG tablet     Sig: Take 1 tablet (10 mg total) by mouth  every evening.     Dispense:  60 tablet     Refill:  1    HYDROcodone-acetaminophen (NORCO)  mg per tablet     Sig: Take 1 tablet by mouth every 8 (eight) hours as needed for Pain.     Dispense:  80 tablet     Refill:  0    ibuprofen (ADVIL,MOTRIN) 800 MG tablet     Sig: Take 1 tablet (800 mg total) by mouth 3 (three) times daily.     Dispense:  30 tablet     Refill:  0     Patient Instructions: Daily home exercises/warm soaks   Restrictions: Disabled until next office visit  Follow-up in about 4 weeks (around 9/25/2018).

## 2018-08-28 NOTE — LETTER
[facility] [address]  [place] [address]  Phone: [phone]  Fax: [phone]    Pt Name: Sasha Michelle  Injury Date: 03/15/2001   Employee ID:  Date of Treatment: 08/28/2018   Company: [employer]            Appointment Time: 11:20 AM Arrived: 11:20 AM CDT   Appointment Date: [unfilled] Time Out:1215 PM   Physician: Miguel Staples MD        Office Treatment: Sasha was seen today for back pain.    Diagnoses and all orders for this visit:  EXAM  DISABLED UNTIL NEXT OFFICE VISIT    Degeneration of lumbar intervertebral disc  -     ibuprofen (ADVIL,MOTRIN) 800 MG tablet; Take 1 tablet (800 mg total) by mouth 3 (three) times daily.  Chronic bilateral low back pain without sciatica  Sprain of ligaments of lumbar spine, subsequent encounter  -     HYDROcodone-acetaminophen (NORCO)  mg per tablet; Take 1 tablet by mouth every 8 (eight) hours as needed for Pain.  Other orders  -     cyclobenzaprine (FLEXERIL) 10 MG tablet; Take 1 tablet (10 mg total) by mouth every evening.     Patient Instructions: Daily home exercises/warm soaks    Restrictions: Disabled until next office visit       Return Appointment: 9/25/2018 at 0830 AM

## 2018-09-25 ENCOUNTER — OFFICE VISIT (OUTPATIENT)
Dept: URGENT CARE | Facility: CLINIC | Age: 68
End: 2018-09-25
Payer: OTHER GOVERNMENT

## 2018-09-25 DIAGNOSIS — G89.29 CHRONIC BILATERAL LOW BACK PAIN WITHOUT SCIATICA: ICD-10-CM

## 2018-09-25 DIAGNOSIS — M54.50 CHRONIC BILATERAL LOW BACK PAIN WITHOUT SCIATICA: ICD-10-CM

## 2018-09-25 DIAGNOSIS — M51.36 DEGENERATIVE DISC DISEASE, LUMBAR: Primary | ICD-10-CM

## 2018-09-25 DIAGNOSIS — M51.36 DEGENERATION OF LUMBAR INTERVERTEBRAL DISC: ICD-10-CM

## 2018-09-25 DIAGNOSIS — S33.5XXD SPRAIN OF LIGAMENTS OF LUMBAR SPINE, SUBSEQUENT ENCOUNTER: ICD-10-CM

## 2018-09-25 PROCEDURE — 99214 OFFICE O/P EST MOD 30 MIN: CPT | Mod: S$GLB,,, | Performed by: PREVENTIVE MEDICINE

## 2018-09-25 RX ORDER — HYDROCODONE BITARTRATE AND ACETAMINOPHEN 10; 325 MG/1; MG/1
1 TABLET ORAL EVERY 8 HOURS PRN
Qty: 100 TABLET | Refills: 0 | Status: SHIPPED | OUTPATIENT
Start: 2018-09-25 | End: 2018-10-23 | Stop reason: SDUPTHER

## 2018-09-25 RX ORDER — IBUPROFEN 800 MG/1
800 TABLET ORAL 3 TIMES DAILY
Qty: 30 TABLET | Refills: 0 | Status: SHIPPED | OUTPATIENT
Start: 2018-09-25 | End: 2018-10-23 | Stop reason: SDUPTHER

## 2018-09-25 RX ORDER — LIDOCAINE 50 MG/G
PATCH TOPICAL DAILY
Qty: 30 PATCH | Refills: 0 | Status: SHIPPED | OUTPATIENT
Start: 2018-09-25 | End: 2018-10-23 | Stop reason: SDUPTHER

## 2018-09-25 RX ORDER — CYCLOBENZAPRINE HCL 10 MG
10 TABLET ORAL NIGHTLY
Qty: 60 TABLET | Refills: 1 | Status: SHIPPED | OUTPATIENT
Start: 2018-09-25 | End: 2018-10-23 | Stop reason: SDUPTHER

## 2018-09-25 NOTE — LETTER
[facility] [place]  [facility]  [place] [address]  Phone: [phone]  Fax: [phone]    Pt Name: Sasha John Date: 03/15/2001   Employee ID:  Date of First Treatment: 09/25/2018   Company: [employer]      Appointment Time: 08:15 AM Arrived: 0910 AM   Provider: Miguel Staples MD Time Out: 1040 AM     Office Treatment:   EXAM  DISABLED UNTIL NEXT OFFICE VISIT  1. Degenerative disc disease, lumbar    2. Chronic bilateral low back pain without sciatica    3. Sprain of ligaments of lumbar spine, subsequent encounter    4. Degeneration of lumbar intervertebral disc      Medications Ordered This Encounter   Medications    cyclobenzaprine (FLEXERIL) 10 MG tablet    HYDROcodone-acetaminophen (NORCO)  mg per tablet    ibuprofen (ADVIL,MOTRIN) 800 MG tablet    lidocaine (LIDODERM) 5 %      Patient Instructions: Daily home exercises/warm soaks    Restrictions: Disabled until next office visit     Return Appointment: 10/23/2018 at 0830 AM

## 2018-09-25 NOTE — PROGRESS NOTES
Subjective:       Patient ID: Sasha Michelle is a 67 y.o. female.    Chief Complaint: Back Pain    Patient is a follow up for back pain from 3/15/01. Not working, meds helping. Pain is 4/10. Patient states she is under a lot of stress and it is causing tension to lower back and shoulders. Ambulatory. MJB      Back Pain   This is a recurrent problem. The current episode started more than 1 month ago. The problem occurs daily. The problem is unchanged. The pain is present in the lumbar spine. Radiates to: shoulders. The pain is at a severity of 4/10. The pain is moderate. The pain is the same all the time. The symptoms are aggravated by position and stress. Pertinent negatives include no abdominal pain, chest pain, fever or headaches. She has tried bed rest and NSAIDs for the symptoms. The treatment provided moderate relief.     Review of Systems   Constitution: Negative for chills and fever.   HENT: Negative for sore throat.    Eyes: Negative for blurred vision.   Cardiovascular: Negative for chest pain.   Respiratory: Negative for shortness of breath.    Skin: Negative for rash.   Musculoskeletal: Positive for back pain, joint pain and stiffness.   Gastrointestinal: Negative for abdominal pain, diarrhea, nausea and vomiting.   Neurological: Negative for headaches.   Psychiatric/Behavioral: The patient is not nervous/anxious.        Objective:      Physical Exam   Constitutional: She appears well-developed and well-nourished.   HENT:   Head: Normocephalic and atraumatic.   Eyes: EOM are normal. Pupils are equal, round, and reactive to light.   Neck: Normal range of motion. Neck supple.   Cardiovascular: Normal rate.   Pulmonary/Chest: Effort normal.   Musculoskeletal:        Cervical back: She exhibits decreased range of motion, tenderness and pain. She exhibits no bony tenderness, no swelling, no edema, no deformity, no laceration, no spasm and normal pulse.        Lumbar back: She exhibits decreased range of  motion, tenderness, pain and spasm. She exhibits no bony tenderness, no swelling, no edema, no deformity, no laceration and normal pulse.        Back:    Pain with palpation of low back without spasm. Pain  with flexion to 90, extension to 25 and lateral bending to 25 degrees. No motor or sensory deficits.   Patient also has pain about upper back and neck with palpation and range of motion testing. Pain with flexion of neck to 25 degrees, extension to 10 degrees and later bending to 25 degrees.   Neurological: She is alert.   No focal neurologic deficits   Skin: Skin is warm.   Psychiatric: She has a normal mood and affect. Her behavior is normal. Judgment and thought content normal.   Nursing note and vitals reviewed.      Assessment:       1. Degenerative disc disease, lumbar    2. Chronic bilateral low back pain without sciatica    3. Sprain of ligaments of lumbar spine, subsequent encounter    4. Degeneration of lumbar intervertebral disc        Plan:         Medications Ordered This Encounter   Medications    cyclobenzaprine (FLEXERIL) 10 MG tablet     Sig: Take 1 tablet (10 mg total) by mouth every evening.     Dispense:  60 tablet     Refill:  1    HYDROcodone-acetaminophen (NORCO)  mg per tablet     Sig: Take 1 tablet by mouth every 8 (eight) hours as needed for Pain.     Dispense:  100 tablet     Refill:  0    ibuprofen (ADVIL,MOTRIN) 800 MG tablet     Sig: Take 1 tablet (800 mg total) by mouth 3 (three) times daily.     Dispense:  30 tablet     Refill:  0    lidocaine (LIDODERM) 5 %     Sig: Place onto the skin once daily.     Dispense:  30 patch     Refill:  0     Patient Instructions: Daily home exercises/warm soaks   Restrictions: Disabled until next office visit  Follow-up in about 4 weeks (around 10/23/2018).

## 2018-10-23 ENCOUNTER — OFFICE VISIT (OUTPATIENT)
Dept: URGENT CARE | Facility: CLINIC | Age: 68
End: 2018-10-23
Payer: OTHER GOVERNMENT

## 2018-10-23 DIAGNOSIS — M51.36 DEGENERATION OF LUMBAR INTERVERTEBRAL DISC: ICD-10-CM

## 2018-10-23 DIAGNOSIS — G89.29 CHRONIC BILATERAL LOW BACK PAIN WITHOUT SCIATICA: ICD-10-CM

## 2018-10-23 DIAGNOSIS — S33.5XXD SPRAIN OF LIGAMENTS OF LUMBAR SPINE, SUBSEQUENT ENCOUNTER: ICD-10-CM

## 2018-10-23 DIAGNOSIS — M54.50 CHRONIC BILATERAL LOW BACK PAIN WITHOUT SCIATICA: ICD-10-CM

## 2018-10-23 DIAGNOSIS — M51.36 DEGENERATIVE DISC DISEASE, LUMBAR: ICD-10-CM

## 2018-10-23 PROCEDURE — 99214 OFFICE O/P EST MOD 30 MIN: CPT | Mod: S$GLB,,, | Performed by: PREVENTIVE MEDICINE

## 2018-10-23 RX ORDER — LIDOCAINE 50 MG/G
PATCH TOPICAL DAILY
Qty: 30 PATCH | Refills: 0 | Status: SHIPPED | OUTPATIENT
Start: 2018-10-23 | End: 2018-11-27 | Stop reason: SDUPTHER

## 2018-10-23 RX ORDER — IBUPROFEN 800 MG/1
800 TABLET ORAL 3 TIMES DAILY
Qty: 30 TABLET | Refills: 0 | Status: SHIPPED | OUTPATIENT
Start: 2018-10-23 | End: 2018-11-27 | Stop reason: SDUPTHER

## 2018-10-23 RX ORDER — CYCLOBENZAPRINE HCL 10 MG
10 TABLET ORAL NIGHTLY
Qty: 60 TABLET | Refills: 1 | Status: SHIPPED | OUTPATIENT
Start: 2018-10-23 | End: 2018-11-27 | Stop reason: SDUPTHER

## 2018-10-23 RX ORDER — HYDROCODONE BITARTRATE AND ACETAMINOPHEN 10; 325 MG/1; MG/1
1 TABLET ORAL EVERY 6 HOURS PRN
Qty: 90 TABLET | Refills: 0 | Status: SHIPPED | OUTPATIENT
Start: 2018-10-23 | End: 2018-11-27 | Stop reason: SDUPTHER

## 2018-10-23 NOTE — LETTER
[facility] [place]  [address]  [place] [address]  Phone: [phone]  Fax: [phone]  [facility]: [email or web address]    Pt Name: Sasha Michelle  Injury Date: 03/15/2001   Employee ID:  Date of Treatment: 10/23/2018   Company: [employer]      Appointment Time: 08:15 AM Arrived: 0915 AM    Provider: Miguel Staples MD Time Out:1050 AM     Office Treatment:   EXAM  DISABLED UNTIL NEXT OFFICE VISIT    1. Chronic bilateral low back pain without sciatica    2. Degeneration of lumbar intervertebral disc    3. Sprain of ligaments of lumbar spine, subsequent encounter    4. Degenerative disc disease, lumbar      Medications Ordered This Encounter   Medications    cyclobenzaprine (FLEXERIL) 10 MG tablet    HYDROcodone-acetaminophen (NORCO)  mg per tablet    ibuprofen (ADVIL,MOTRIN) 800 MG tablet    lidocaine (LIDODERM) 5 %                 Return Appointment: 11/27/2018 at 0900

## 2018-10-23 NOTE — PROGRESS NOTES
Subjective:       Patient ID: Sasha Michelle is a 67 y.o. female.    Chief Complaint: Back Pain (03/15/2001) and Hip Pain (LEFT AND RIGHT)     Patient is a follow up for back pain from 3/15/01. Not working, meds helping. Pain is 4/10. Patient states she is under a lot of stress and it is causing tension to lower back and shoulders with burning pain. Ambulatory. MJB      Back Pain   This is a recurrent problem. The current episode started more than 1 month ago. The problem occurs daily. The pain is present in the lumbar spine. The pain is at a severity of 5/10. The pain is moderate. The pain is the same all the time. Pertinent negatives include no abdominal pain, chest pain, fever or headaches. She has tried NSAIDs for the symptoms. The treatment provided moderate relief.   Hip Pain    The incident occurred more than 1 week ago. The incident occurred at work. The quality of the pain is described as burning. The pain is at a severity of 5/10. The pain is moderate. The pain has been constant since onset. The symptoms are aggravated by movement. She has tried NSAIDs for the symptoms. The treatment provided mild relief.     Review of Systems   Constitution: Negative for chills and fever.   HENT: Negative for sore throat.    Eyes: Negative for blurred vision.   Cardiovascular: Negative for chest pain.   Respiratory: Negative for shortness of breath.    Skin: Negative for rash.   Musculoskeletal: Positive for back pain, neck pain and stiffness. Negative for joint pain.   Gastrointestinal: Negative for abdominal pain, diarrhea, nausea and vomiting.   Neurological: Negative for headaches.   Psychiatric/Behavioral: The patient is not nervous/anxious.        Objective:      Physical Exam   Constitutional: She appears well-developed and well-nourished.   HENT:   Head: Normocephalic and atraumatic.   Eyes: EOM are normal. Pupils are equal, round, and reactive to light.   Neck: Normal range of motion. Neck supple.    Cardiovascular: Normal rate.   Pulmonary/Chest: Effort normal.   Musculoskeletal:        Cervical back: She exhibits decreased range of motion, tenderness and pain. She exhibits no bony tenderness, no swelling, no edema, no deformity, no laceration, no spasm and normal pulse.        Lumbar back: She exhibits decreased range of motion, tenderness, pain and spasm. She exhibits no bony tenderness, no swelling, no edema, no deformity, no laceration and normal pulse.        Back:    Pain with palpation of low back without spasm. Pain  with flexion to 90, extension to 25 and lateral bending to 25 degrees. No motor or sensory deficits.   Patient also has pain about upper back and neck with palpation and range of motion testing. Pain with flexion of neck to 25 degrees, extension to 10 degrees and later bending to 25 degrees.   Neurological: She is alert.   No focal neurologic deficits   Skin: Skin is warm.   Psychiatric: She has a normal mood and affect. Her behavior is normal. Judgment and thought content normal.   Nursing note and vitals reviewed.      Assessment:       1. Chronic bilateral low back pain without sciatica    2. Degeneration of lumbar intervertebral disc    3. Sprain of ligaments of lumbar spine, subsequent encounter    4. Degenerative disc disease, lumbar        Plan:         Medications Ordered This Encounter   Medications    cyclobenzaprine (FLEXERIL) 10 MG tablet     Sig: Take 1 tablet (10 mg total) by mouth every evening.     Dispense:  60 tablet     Refill:  1    HYDROcodone-acetaminophen (NORCO)  mg per tablet     Sig: Take 1 tablet by mouth every 6 (six) hours as needed for Pain.     Dispense:  90 tablet     Refill:  0    ibuprofen (ADVIL,MOTRIN) 800 MG tablet     Sig: Take 1 tablet (800 mg total) by mouth 3 (three) times daily.     Dispense:  30 tablet     Refill:  0    lidocaine (LIDODERM) 5 %     Sig: Place onto the skin once daily.     Dispense:  30 patch     Refill:  0             Follow-up in about 5 weeks (around 11/27/2018).

## 2018-11-27 ENCOUNTER — OFFICE VISIT (OUTPATIENT)
Dept: URGENT CARE | Facility: CLINIC | Age: 68
End: 2018-11-27
Payer: OTHER GOVERNMENT

## 2018-11-27 DIAGNOSIS — G89.29 CHRONIC BILATERAL LOW BACK PAIN WITHOUT SCIATICA: ICD-10-CM

## 2018-11-27 DIAGNOSIS — S33.5XXD SPRAIN OF LIGAMENTS OF LUMBAR SPINE, SUBSEQUENT ENCOUNTER: ICD-10-CM

## 2018-11-27 DIAGNOSIS — M54.50 CHRONIC BILATERAL LOW BACK PAIN WITHOUT SCIATICA: ICD-10-CM

## 2018-11-27 DIAGNOSIS — M51.36 DEGENERATION OF LUMBAR INTERVERTEBRAL DISC: ICD-10-CM

## 2018-11-27 DIAGNOSIS — M51.36 DEGENERATIVE DISC DISEASE, LUMBAR: ICD-10-CM

## 2018-11-27 PROCEDURE — 99214 OFFICE O/P EST MOD 30 MIN: CPT | Mod: S$GLB,,, | Performed by: PREVENTIVE MEDICINE

## 2018-11-27 RX ORDER — IBUPROFEN 800 MG/1
800 TABLET ORAL 3 TIMES DAILY
Qty: 30 TABLET | Refills: 0 | Status: SHIPPED | OUTPATIENT
Start: 2018-11-27 | End: 2019-01-03 | Stop reason: SDUPTHER

## 2018-11-27 RX ORDER — HYDROCODONE BITARTRATE AND ACETAMINOPHEN 10; 325 MG/1; MG/1
1 TABLET ORAL EVERY 6 HOURS PRN
Qty: 90 TABLET | Refills: 0 | Status: SHIPPED | OUTPATIENT
Start: 2018-11-27 | End: 2019-01-03 | Stop reason: SDUPTHER

## 2018-11-27 RX ORDER — LIDOCAINE 50 MG/G
PATCH TOPICAL DAILY
Qty: 30 PATCH | Refills: 0 | Status: SHIPPED | OUTPATIENT
Start: 2018-11-27 | End: 2019-01-03 | Stop reason: SDUPTHER

## 2018-11-27 RX ORDER — CYCLOBENZAPRINE HCL 10 MG
10 TABLET ORAL NIGHTLY
Qty: 60 TABLET | Refills: 1 | Status: SHIPPED | OUTPATIENT
Start: 2018-11-27 | End: 2019-01-26

## 2018-11-27 NOTE — LETTER
[facility] [place] [address]  [place] [address]  Phone: [phone]  Fax: [phone]  [facility]: [id number]    Pt Name: Sasha Michelle  Injury Date: 03/15/2001   Employee ID:  Date of Treatment: 11/27/2018   Company: [employer]      Appointment Time: 08:45 AM Arrived: 8:43AM   Provider: Miguel Staples MD Time Out: 10:00AM     Office Treatment:   EXAM:  RX GIVEN  DISABLED UNTIL NEXT OFFICE VISIT    1. Chronic bilateral low back pain without sciatica    2. Degeneration of lumbar intervertebral disc    3. Sprain of ligaments of lumbar spine, subsequent encounter    4. Degenerative disc disease, lumbar      Medications Ordered This Encounter   Medications    cyclobenzaprine (FLEXERIL) 10 MG tablet    HYDROcodone-acetaminophen (NORCO)  mg per tablet    ibuprofen (ADVIL,MOTRIN) 800 MG tablet    lidocaine (LIDODERM) 5 %      Patient Instructions: Daily home exercises/warm soaks      Restrictions: Disabled until next office visit     Return Appointment: 1/3/2019 at 8:30AM

## 2018-11-27 NOTE — PROGRESS NOTES
Subjective:       Patient ID: Sasha Michelle is a 67 y.o. female.    Chief Complaint: Back Pain (Back Pain (03/15/2001) ) and Hip Pain ( Hip Pain (LEFT AND RIGHT 3/15/01) )    Follow up for back and hip pain from 3/15/01. Patient states no longer has pain in back and hips but it is now in the neck 4/10. Wakes up at night to take pain meds to sleep. Meds help, needs rx refill. Ambulatory. MJB      Back Pain   This is a recurrent problem. The current episode started more than 1 year ago. The problem occurs intermittently. The problem has been gradually improving since onset. The pain is present in the lumbar spine. The quality of the pain is described as aching. Radiates to: neck. The pain is at a severity of 0/10. The pain is mild. The pain is worse during the night. The symptoms are aggravated by position. Pertinent negatives include no abdominal pain, chest pain, fever or headaches. She has tried muscle relaxant, ice, NSAIDs, home exercises and bed rest for the symptoms. The treatment provided moderate relief.   Hip Pain    The incident occurred more than 1 week ago. The incident occurred at work. The pain is at a severity of 0/10. The patient is experiencing no pain. The pain has been improving since onset. Nothing aggravates the symptoms. She has tried heat, ice and rest for the symptoms. The treatment provided moderate relief.     Review of Systems   Constitution: Negative for chills and fever.   HENT: Negative for sore throat.    Eyes: Negative for blurred vision.   Cardiovascular: Negative for chest pain.   Respiratory: Negative for shortness of breath.    Skin: Negative for rash.   Musculoskeletal: Positive for neck pain. Negative for back pain and joint pain.   Gastrointestinal: Negative for abdominal pain, diarrhea, nausea and vomiting.   Neurological: Negative for headaches.   Psychiatric/Behavioral: The patient is not nervous/anxious.        Objective:      Physical Exam   Constitutional: She appears  well-developed and well-nourished.   HENT:   Head: Normocephalic and atraumatic.   Eyes: EOM are normal. Pupils are equal, round, and reactive to light.   Neck: Normal range of motion. Neck supple.   Cardiovascular: Normal rate.   Pulmonary/Chest: Effort normal.   Musculoskeletal:        Cervical back: She exhibits decreased range of motion, tenderness and pain. She exhibits no bony tenderness, no swelling, no edema, no deformity, no laceration, no spasm and normal pulse.        Lumbar back: She exhibits decreased range of motion, tenderness, pain and spasm. She exhibits no bony tenderness, no swelling, no edema, no deformity, no laceration and normal pulse.        Back:    Pain with palpation of low back without spasm. Pain  with flexion to 90, extension to 25 and lateral bending to 25 degrees. No motor or sensory deficits.   Patient also has pain about upper back and neck with palpation and range of motion testing. Pain with flexion of neck to 25 degrees, extension to 10 degrees and later bending to 25 degrees.   Neurological: She is alert.   No focal neurologic deficits   Skin: Skin is warm.   Psychiatric: She has a normal mood and affect. Her behavior is normal. Judgment and thought content normal.   Nursing note and vitals reviewed.      Assessment:       1. Chronic bilateral low back pain without sciatica    2. Degeneration of lumbar intervertebral disc    3. Sprain of ligaments of lumbar spine, subsequent encounter    4. Degenerative disc disease, lumbar        Plan:         Medications Ordered This Encounter   Medications    cyclobenzaprine (FLEXERIL) 10 MG tablet     Sig: Take 1 tablet (10 mg total) by mouth every evening.     Dispense:  60 tablet     Refill:  1    HYDROcodone-acetaminophen (NORCO)  mg per tablet     Sig: Take 1 tablet by mouth every 6 (six) hours as needed for Pain.     Dispense:  90 tablet     Refill:  0    ibuprofen (ADVIL,MOTRIN) 800 MG tablet     Sig: Take 1 tablet (800 mg  total) by mouth 3 (three) times daily.     Dispense:  30 tablet     Refill:  0    lidocaine (LIDODERM) 5 %     Sig: Place onto the skin once daily.     Dispense:  30 patch     Refill:  0     Patient Instructions: Daily home exercises/warm soaks   Restrictions: Disabled until next office visit  Follow-up in about 5 weeks (around 1/3/2019).

## 2019-01-03 ENCOUNTER — OFFICE VISIT (OUTPATIENT)
Dept: URGENT CARE | Facility: CLINIC | Age: 69
End: 2019-01-03
Payer: OTHER GOVERNMENT

## 2019-01-03 DIAGNOSIS — M51.36 DEGENERATION OF LUMBAR INTERVERTEBRAL DISC: ICD-10-CM

## 2019-01-03 DIAGNOSIS — M54.50 CHRONIC BILATERAL LOW BACK PAIN WITHOUT SCIATICA: Primary | ICD-10-CM

## 2019-01-03 DIAGNOSIS — G89.29 CHRONIC BILATERAL LOW BACK PAIN WITHOUT SCIATICA: Primary | ICD-10-CM

## 2019-01-03 DIAGNOSIS — S33.5XXD SPRAIN OF LIGAMENTS OF LUMBAR SPINE, SUBSEQUENT ENCOUNTER: ICD-10-CM

## 2019-01-03 PROCEDURE — 99214 OFFICE O/P EST MOD 30 MIN: CPT | Mod: S$GLB,,, | Performed by: PREVENTIVE MEDICINE

## 2019-01-03 PROCEDURE — 99214 PR OFFICE/OUTPT VISIT, EST, LEVL IV, 30-39 MIN: ICD-10-PCS | Mod: S$GLB,,, | Performed by: PREVENTIVE MEDICINE

## 2019-01-03 RX ORDER — LIDOCAINE 50 MG/G
PATCH TOPICAL DAILY
Qty: 30 PATCH | Refills: 0 | Status: SHIPPED | OUTPATIENT
Start: 2019-01-03 | End: 2019-02-02

## 2019-01-03 RX ORDER — IBUPROFEN 800 MG/1
800 TABLET ORAL 3 TIMES DAILY
Qty: 30 TABLET | Refills: 0 | Status: SHIPPED | OUTPATIENT
Start: 2019-01-03 | End: 2019-02-12 | Stop reason: SDUPTHER

## 2019-01-03 RX ORDER — HYDROCODONE BITARTRATE AND ACETAMINOPHEN 10; 325 MG/1; MG/1
1 TABLET ORAL EVERY 6 HOURS PRN
Qty: 90 TABLET | Refills: 0 | Status: SHIPPED | OUTPATIENT
Start: 2019-01-03 | End: 2019-02-12 | Stop reason: SDUPTHER

## 2019-01-03 NOTE — PROGRESS NOTES
Subjective:       Patient ID: Sasha Michelle is a 68 y.o. female.    Chief Complaint: Back Pain (03/15/2001) and Hip Injury (LEFT AND RIGHT 3/15/01)    Follow up Back 8/10 and Hip Pain 0/10 (LEFT AND RIGHT 3/15/01). Vicodin, flexaril and Ibuprofen helps some. Sitting too long increases pain. Ambulatory. MJB      Back Pain   This is a recurrent problem. The current episode started more than 1 month ago. The problem occurs daily. The problem is unchanged. The pain is present in the lumbar spine. The quality of the pain is described as aching. The pain is at a severity of 8/10. The pain is the same all the time. The symptoms are aggravated by position. Stiffness is present in the morning. Pertinent negatives include no abdominal pain, chest pain, fever or headaches. She has tried bed rest, heat, ice, muscle relaxant and NSAIDs for the symptoms. The treatment provided no relief.   Hip Injury   This is a recurrent problem. The current episode started more than 1 month ago. The problem occurs rarely. The problem has been resolved. Pertinent negatives include no abdominal pain, chest pain, chills, fever, headaches, nausea, rash, sore throat or vomiting. The symptoms are aggravated by exertion. She has tried acetaminophen, ice, heat, NSAIDs and position changes for the symptoms. The treatment provided moderate relief.     Review of Systems   Constitution: Negative for chills and fever.   HENT: Negative for sore throat.    Eyes: Negative for blurred vision.   Cardiovascular: Negative for chest pain.   Respiratory: Negative for shortness of breath.    Skin: Negative for rash.   Musculoskeletal: Positive for back pain, joint pain and stiffness.   Gastrointestinal: Negative for abdominal pain, diarrhea, nausea and vomiting.   Neurological: Negative for headaches.   Psychiatric/Behavioral: The patient is not nervous/anxious.        Objective:      Physical Exam   Constitutional: She appears well-developed and well-nourished.    HENT:   Head: Normocephalic and atraumatic.   Eyes: EOM are normal. Pupils are equal, round, and reactive to light.   Neck: Normal range of motion. Neck supple.   Cardiovascular: Normal rate.   Pulmonary/Chest: Effort normal.   Musculoskeletal:        Cervical back: She exhibits decreased range of motion, tenderness and pain. She exhibits no bony tenderness, no swelling, no edema, no deformity, no laceration, no spasm and normal pulse.        Lumbar back: She exhibits decreased range of motion, tenderness, pain and spasm. She exhibits no bony tenderness, no swelling, no edema, no deformity, no laceration and normal pulse.        Back:    Pain with palpation of low back without spasm. Pain  with flexion to 90, extension to 25 and lateral bending to 25 degrees. No motor or sensory deficits.   Patient also has pain about upper back and neck with palpation and range of motion testing. Pain with flexion of neck to 25 degrees, extension to 10 degrees and later bending to 25 degrees.   Neurological: She is alert.   No focal neurologic deficits   Skin: Skin is warm.   Psychiatric: She has a normal mood and affect. Her behavior is normal. Judgment and thought content normal.   Nursing note and vitals reviewed.      Assessment:       1. Chronic bilateral low back pain without sciatica    2. Degeneration of lumbar intervertebral disc    3. Sprain of ligaments of lumbar spine, subsequent encounter        Plan:         Medications Ordered This Encounter   Medications    HYDROcodone-acetaminophen (NORCO)  mg per tablet     Sig: Take 1 tablet by mouth every 6 (six) hours as needed for Pain.     Dispense:  90 tablet     Refill:  0    ibuprofen (ADVIL,MOTRIN) 800 MG tablet     Sig: Take 1 tablet (800 mg total) by mouth 3 (three) times daily.     Dispense:  30 tablet     Refill:  0    lidocaine (LIDODERM) 5 %     Sig: Place onto the skin once daily.     Dispense:  30 patch     Refill:  0     Patient Instructions: Daily  home exercises/warm soaks   Restrictions: Disabled until next office visit  Follow-up in about 5 weeks (around 2/7/2019).

## 2019-01-03 NOTE — LETTER
[facility] [place] [address]  [place] [address]  Phone: [phone]  Fax: [phone]  [facility]: [id number]    Pt Name: Sasha Michelle  Injury Date: 03/15/2001   Employee ID:  Date of First Treatment: 01/03/2019   Company: [employer]      Appointment Time: 08:15 AM Arrived: 8:45AM   Provider: Miguel Staples MD Time Out: 9:55AM     Office Treatment:   EXAM:  RX 2X GIVEN  DISABLED UNTIL NEXT OFFICE VISIT    1. Chronic bilateral low back pain without sciatica    2. Degeneration of lumbar intervertebral disc    3. Sprain of ligaments of lumbar spine, subsequent encounter      Medications Ordered This Encounter   Medications    HYDROcodone-acetaminophen (NORCO)  mg per tablet    ibuprofen (ADVIL,MOTRIN) 800 MG tablet    lidocaine (LIDODERM) 5 %      Patient Instructions: Daily home exercises/warm soaks      Restrictions: Disabled until next office visit     Return Appointment: 2/7/2019 at 9:00AM

## 2019-02-12 ENCOUNTER — OFFICE VISIT (OUTPATIENT)
Dept: URGENT CARE | Facility: CLINIC | Age: 69
End: 2019-02-12
Payer: OTHER GOVERNMENT

## 2019-02-12 DIAGNOSIS — M51.36 DEGENERATION OF LUMBAR INTERVERTEBRAL DISC: ICD-10-CM

## 2019-02-12 DIAGNOSIS — M54.50 CHRONIC BILATERAL LOW BACK PAIN WITHOUT SCIATICA: ICD-10-CM

## 2019-02-12 DIAGNOSIS — G89.29 CHRONIC BILATERAL LOW BACK PAIN WITHOUT SCIATICA: ICD-10-CM

## 2019-02-12 DIAGNOSIS — S33.5XXD SPRAIN OF LIGAMENTS OF LUMBAR SPINE, SUBSEQUENT ENCOUNTER: ICD-10-CM

## 2019-02-12 PROCEDURE — 99214 OFFICE O/P EST MOD 30 MIN: CPT | Mod: S$GLB,,, | Performed by: PREVENTIVE MEDICINE

## 2019-02-12 PROCEDURE — 99214 PR OFFICE/OUTPT VISIT, EST, LEVL IV, 30-39 MIN: ICD-10-PCS | Mod: S$GLB,,, | Performed by: PREVENTIVE MEDICINE

## 2019-02-12 RX ORDER — IBUPROFEN 800 MG/1
800 TABLET ORAL 3 TIMES DAILY
Qty: 30 TABLET | Refills: 0 | Status: SHIPPED | OUTPATIENT
Start: 2019-02-12 | End: 2019-03-21 | Stop reason: SDUPTHER

## 2019-02-12 RX ORDER — HYDROCODONE BITARTRATE AND ACETAMINOPHEN 10; 325 MG/1; MG/1
1 TABLET ORAL EVERY 6 HOURS PRN
Qty: 90 TABLET | Refills: 0 | Status: SHIPPED | OUTPATIENT
Start: 2019-02-12 | End: 2019-03-21 | Stop reason: ALTCHOICE

## 2019-02-12 NOTE — PROGRESS NOTES
Subjective:       Patient ID: Sasha Michelle is a 68 y.o. female.    Chief Complaint: Back Pain    Follow up (doi 3/5/01) Patients pain level today is a 6/10. She is currently not working. LM      Review of Systems   Constitution: Negative for chills and fever.   HENT: Negative for sore throat.    Eyes: Negative for blurred vision.   Cardiovascular: Negative for chest pain.   Respiratory: Negative for shortness of breath.    Skin: Negative for rash.   Musculoskeletal: Positive for back pain. Negative for joint pain.   Gastrointestinal: Negative for abdominal pain, diarrhea, nausea and vomiting.   Neurological: Negative for headaches.   Psychiatric/Behavioral: The patient is not nervous/anxious.    All other systems reviewed and are negative.      Objective:      Physical Exam   Constitutional: She appears well-developed and well-nourished.   HENT:   Head: Normocephalic and atraumatic.   Eyes: EOM are normal. Pupils are equal, round, and reactive to light.   Neck: Normal range of motion. Neck supple.   Cardiovascular: Normal rate.   Pulmonary/Chest: Effort normal.   Musculoskeletal:        Cervical back: She exhibits decreased range of motion, tenderness and pain. She exhibits no bony tenderness, no swelling, no edema, no deformity, no laceration, no spasm and normal pulse.        Lumbar back: She exhibits decreased range of motion, tenderness, pain and spasm. She exhibits no bony tenderness, no swelling, no edema, no deformity, no laceration and normal pulse.        Back:    Pain with palpation of low back without spasm. Pain  with flexion to 90, extension to 25 and lateral bending to 25 degrees. No motor or sensory deficits.   Patient also has pain about upper back and neck with palpation and range of motion testing. Pain with flexion of neck to 25 degrees, extension to 10 degrees and later bending to 25 degrees.   Neurological: She is alert.   No focal neurologic deficits   Skin: Skin is warm.   Psychiatric: She  has a normal mood and affect. Her behavior is normal. Judgment and thought content normal.   Nursing note and vitals reviewed.      Assessment:       1. Degeneration of lumbar intervertebral disc    2. Chronic bilateral low back pain without sciatica    3. Sprain of ligaments of lumbar spine, subsequent encounter        Plan:         Medications Ordered This Encounter   Medications    HYDROcodone-acetaminophen (NORCO)  mg per tablet     Sig: Take 1 tablet by mouth every 6 (six) hours as needed for Pain.     Dispense:  90 tablet     Refill:  0    ibuprofen (ADVIL,MOTRIN) 800 MG tablet     Sig: Take 1 tablet (800 mg total) by mouth 3 (three) times daily.     Dispense:  30 tablet     Refill:  0     Patient Instructions: Daily home exercises/warm soaks   Restrictions: Disabled until next office visit  Follow-up in about 5 weeks (around 3/19/2019).

## 2019-02-12 NOTE — LETTER
[facility] [place]  [address]  [place] [address]  Phone: [phone]  Fax: [phone]  [facility]: [id number]    Pt Name: Sasha Michelle  Injury Date: 03/15/2001   Employee ID:  Date of Treatment: 02/12/2019   Company: [employer]      Appointment Time: 11:45 AM Arrived: 9:59AM   Provider: Miguel Staples MD Time Out: 11:20AM     Office Treatment:   EXAM  RX 2X GIVEN  DISABLED UNTIL NEXT OFFICE VISIT    1. Degeneration of lumbar intervertebral disc    2. Chronic bilateral low back pain without sciatica    3. Sprain of ligaments of lumbar spine, subsequent encounter      Medications Ordered This Encounter   Medications    HYDROcodone-acetaminophen (NORCO)  mg per tablet    ibuprofen (ADVIL,MOTRIN) 800 MG tablet      Patient Instructions: Daily home exercises/warm soaks    Restrictions: Disabled until next office visit     Return Appointment: 3/19/2019 at 8:30AM

## 2019-02-12 NOTE — LETTER
[facility] [place]  [address]  [place] [address]  Phone: [phone]  Fax: [phone]  [facility]: [id number]    Pt Name: Sasha Michelle  Injury Date: 03/15/2001   Employee ID:  Date of First Treatment: 02/12/2019   Company: [employer]      Appointment Time: 11:45 AM Arrived: ***   Provider: Miguel Staples MD Time Out:***     Office Treatment:   1. Degeneration of lumbar intervertebral disc    2. Chronic bilateral low back pain without sciatica    3. Sprain of ligaments of lumbar spine, subsequent encounter      Medications Ordered This Encounter   Medications    HYDROcodone-acetaminophen (NORCO)  mg per tablet    ibuprofen (ADVIL,MOTRIN) 800 MG tablet      Patient Instructions: Daily home exercises/warm soaks    Restrictions: Disabled until next office visit     Return Appointment: Visit date not found at ***

## 2019-02-12 NOTE — LETTER
[facility] [place]  [address]  [place] [address]  Phone: [phone]  Fax: [phone]  [facility]: [id number]    Pt Name: Sasha Michelle  Injury Date: 03/15/2001   Employee ID:  Date of Treatment: 02/12/2019   Company: [employer]      Appointment Time: 11:45 AM Arrived: 9:59AM   Provider: Miguel Staples MD Time Out: 11:15AM     Office Treatment:   EXAM  RX 2X GIVEN  DISABLED UNTIL NEXT OFFICE VISIT    1. Degeneration of lumbar intervertebral disc    2. Chronic bilateral low back pain without sciatica    3. Sprain of ligaments of lumbar spine, subsequent encounter      Medications Ordered This Encounter   Medications    HYDROcodone-acetaminophen (NORCO)  mg per tablet    ibuprofen (ADVIL,MOTRIN) 800 MG tablet      Patient Instructions: Daily home exercises/warm soaks      Restrictions: Disabled until next office visit     Return Appointment: 2/19/2019 at 2:00PM

## 2019-02-17 ENCOUNTER — OFFICE VISIT (OUTPATIENT)
Dept: URGENT CARE | Facility: CLINIC | Age: 69
End: 2019-02-17
Payer: MEDICARE

## 2019-02-17 VITALS
HEART RATE: 69 BPM | DIASTOLIC BLOOD PRESSURE: 78 MMHG | WEIGHT: 142 LBS | SYSTOLIC BLOOD PRESSURE: 140 MMHG | BODY MASS INDEX: 26.13 KG/M2 | OXYGEN SATURATION: 98 % | TEMPERATURE: 98 F | HEIGHT: 62 IN | RESPIRATION RATE: 16 BRPM

## 2019-02-17 DIAGNOSIS — J30.9 ALLERGIC RHINITIS, UNSPECIFIED SEASONALITY, UNSPECIFIED TRIGGER: Primary | ICD-10-CM

## 2019-02-17 PROCEDURE — 96372 THER/PROPH/DIAG INJ SC/IM: CPT | Mod: S$GLB,,, | Performed by: EMERGENCY MEDICINE

## 2019-02-17 PROCEDURE — 99214 PR OFFICE/OUTPT VISIT, EST, LEVL IV, 30-39 MIN: ICD-10-PCS | Mod: 25,S$GLB,, | Performed by: EMERGENCY MEDICINE

## 2019-02-17 PROCEDURE — 96372 PR INJECTION,THERAP/PROPH/DIAG2ST, IM OR SUBCUT: ICD-10-PCS | Mod: S$GLB,,, | Performed by: EMERGENCY MEDICINE

## 2019-02-17 PROCEDURE — 99214 OFFICE O/P EST MOD 30 MIN: CPT | Mod: 25,S$GLB,, | Performed by: EMERGENCY MEDICINE

## 2019-02-17 RX ORDER — LIDOCAINE 50 MG/G
1 PATCH TOPICAL
COMMUNITY
End: 2019-08-08 | Stop reason: SDUPTHER

## 2019-02-17 RX ORDER — AZITHROMYCIN 250 MG/1
250 TABLET, FILM COATED ORAL DAILY
Qty: 6 TABLET | Refills: 0 | Status: SHIPPED | OUTPATIENT
Start: 2019-02-17 | End: 2021-02-11

## 2019-02-17 RX ORDER — HYDROCODONE BITARTRATE AND ACETAMINOPHEN 7.5; 3 MG/1; MG/1
TABLET ORAL
COMMUNITY
End: 2019-03-21 | Stop reason: ALTCHOICE

## 2019-02-17 RX ORDER — ACYCLOVIR 400 MG/1
TABLET ORAL
COMMUNITY
Start: 2019-01-22 | End: 2021-10-27 | Stop reason: ALTCHOICE

## 2019-02-17 RX ORDER — BETAMETHASONE SODIUM PHOSPHATE AND BETAMETHASONE ACETATE 3; 3 MG/ML; MG/ML
6 INJECTION, SUSPENSION INTRA-ARTICULAR; INTRALESIONAL; INTRAMUSCULAR; SOFT TISSUE
Status: COMPLETED | OUTPATIENT
Start: 2019-02-17 | End: 2019-02-17

## 2019-02-17 RX ADMIN — BETAMETHASONE SODIUM PHOSPHATE AND BETAMETHASONE ACETATE 6 MG: 3; 3 INJECTION, SUSPENSION INTRA-ARTICULAR; INTRALESIONAL; INTRAMUSCULAR; SOFT TISSUE at 10:02

## 2019-02-17 NOTE — PROGRESS NOTES
"Subjective:       Patient ID: Sasha Michelle is a 68 y.o. female.    Vitals:  height is 5' 2" (1.575 m) and weight is 64.4 kg (142 lb). Her oral temperature is 98 °F (36.7 °C). Her blood pressure is 140/78 (abnormal) and her pulse is 69. Her respiration is 16 and oxygen saturation is 98%.     Chief Complaint: Sinus Problem and Cough    Scratchy throat 3-4 d ago, resolved, 1-2 d hx sneezing/sinus drainage/congestion/pressure, OK with steroid IM and prn z-luly, NOC.      Sinus Problem   This is a new problem. Episode onset: 3 days. The problem has been gradually worsening since onset. Her pain is at a severity of 9/10. The pain is severe. Associated symptoms include chills, congestion, coughing, diaphoresis and sneezing. Pertinent negatives include no ear pain, shortness of breath, sinus pressure or sore throat. Treatments tried: OTC sinus medication, nyquil. The treatment provided moderate relief.   Cough   Associated symptoms include chills. Pertinent negatives include no ear pain, eye redness, fever, hemoptysis, myalgias, rash, sore throat, shortness of breath or wheezing.       Constitution: Positive for chills, sweating and fatigue. Negative for fever.   HENT: Positive for congestion. Negative for ear pain, sinus pain, sinus pressure, sore throat and voice change.    Neck: Negative for painful lymph nodes.   Eyes: Negative for eye redness.   Respiratory: Positive for cough and sputum production. Negative for chest tightness, bloody sputum, COPD, shortness of breath, stridor, wheezing and asthma.    Gastrointestinal: Positive for nausea. Negative for vomiting.   Musculoskeletal: Negative for muscle ache.   Skin: Negative for rash.   Allergic/Immunologic: Positive for sneezing. Negative for seasonal allergies and asthma.   Hematologic/Lymphatic: Negative for swollen lymph nodes.       Objective:      Physical Exam   Constitutional: She is oriented to person, place, and time. She appears well-developed and " well-nourished.   HENT:   Head: Normocephalic and atraumatic.   Right Ear: Tympanic membrane, external ear and ear canal normal.   Left Ear: Tympanic membrane, external ear and ear canal normal.   Nose: Mucosal edema present. No rhinorrhea. Right sinus exhibits no maxillary sinus tenderness and no frontal sinus tenderness. Left sinus exhibits no maxillary sinus tenderness and no frontal sinus tenderness.   Mouth/Throat: Uvula is midline and mucous membranes are normal. No oropharyngeal exudate, posterior oropharyngeal edema or posterior oropharyngeal erythema.   Neck: Normal range of motion. Neck supple.   Tender right anterior cervical LN to palp   Cardiovascular: Normal rate, regular rhythm and normal heart sounds.   Pulmonary/Chest: Breath sounds normal.   Musculoskeletal: Normal range of motion.   Neurological: She is alert and oriented to person, place, and time.   Skin: Skin is warm and dry.   Psychiatric: She has a normal mood and affect. Her behavior is normal.       Assessment:       1. Allergic rhinitis, unspecified seasonality, unspecified trigger        Plan:         Allergic rhinitis, unspecified seasonality, unspecified trigger        Feliberto Yanez MD  Go to the Emergency Department for any problems  Call your PCP for follow up next available.

## 2019-02-17 NOTE — PATIENT INSTRUCTIONS
Feliberto Yanez MD  Go to the Emergency Department for any problems  Call your PCP for follow up next available.    Allergic Rhinitis  Allergic rhinitis is an allergic reaction that affects the nose, and often the eyes. Its often known as nasal allergies. Nasal allergies are often due to things in the environment that are breathed in. Depending what you are sensitive to, nasal allergies may occur only during certain seasons. Or they may occur year round. Common indoor allergens include house dust mites, mold, cockroaches, and pet dander. Outdoor allergens include pollen from trees, grasses, and weeds.   Symptoms include a drippy, stuffy, and itchy nose. They also include sneezing and red and itchy eyes. You may feel tired more often. Severe allergies may also affect your breathing and trigger a condition called asthma.   Tests can be done to see what allergens are affecting you. You may be referred to an allergy specialist for testing and further evaluation.  Home care  Your healthcare provider may prescribe medicines to help relieve allergy symptoms. These may include oral medicines, nasal sprays, or eye drops.  Ask your provider for advice on how to avoid substances that you are allergic to. Below are a few tips for each type of allergen.  Pet dander:  · Do not have pets with fur and feathers.  · If you can't avoid having a pet, keep it out of your bedroom and off upholstered furniture.  Pollen:  · When pollen counts are high, keep windows of your car and home closed. If possible, use an air conditioner instead.  · Wear a filter mask when mowing or doing yard work.  House dust mites:  · Wash bedding every week in warm water and detergent and dry on a hot setting.  · Cover the mattress, box spring, and pillows with allergy covers.   · If possible, sleep in a room with no carpet, curtains, or upholstered furniture.  Cockroaches:  · Store food in sealed containers.  · Remove garbage from the home promptly.  · Fix  water leaks  Mold:  · Keep humidity low by using a dehumidifier or air conditioner. Keep the dehumidifier and air conditioner clean and free of mold.  · Clean moldy areas with bleach and water.  In general:  · Vacuum once or twice a week. If possible, use a vacuum with a high-efficiency particulate air (HEPA) filter.  · Do not smoke. Avoid cigarette smoke. Cigarette smoke is an irritant that can make symptoms worse.  Follow-up care  Follow up as advised by the healthcare provider or our staff. If you were referred to an allergy specialist, make this appointment promptly.  When to seek medical advice  Call your healthcare provider right away if the following occur:  · Coughing or wheezing  · Fever greater than 100.4°F (38°C)  · Hives (raised red bumps)  · Continuing symptoms, new symptoms, or worsening symptoms  Call 911 right away if you have:  · Trouble breathing  · Severe swelling of the face or severe itching of the eyes or mouth  Date Last Reviewed: 3/1/2017  © 5681-8345 [employer]. [address] [place] [address]. All rights reserved. This information is not intended as a substitute for professional medical care. Always follow your healthcare professional's instructions.        Sinusitis (No Antibiotics)    The sinuses are air-filled spaces within the bones of the face. They connect to the inside of the nose. Sinusitis is an inflammation of the tissue lining the sinus cavity. Sinus inflammation can occur during a cold. It can also be due to allergies to pollens and other particles in the air. It can cause symptoms such as sinus congestion, headache, sore throat, facial swelling and fullness. It may also cause a low-grade fever. No infection is present, and no antibiotic treatment is needed.  Home care  · Drink plenty of water, hot tea, and other liquids. This may help thin mucus. It also may promote sinus drainage.  · Heat may help soothe painful areas of the face. Use a towel soaked in  hot water. Or,  the shower and direct the hot spray onto your face. Using a vaporizer along with a menthol rub at night may also help.   · An expectorant containing guaifenesin may help thin the mucus and promote drainage from the sinuses.  · Over-the-counter decongestants may be used unless a similar medicine was prescribed. Nasal sprays work the fastest. Use one that contains phenylephrine or oxymetazoline. First blow the nose gently. Then use the spray. Do not use these medicines more often than directed on the label or symptoms may get worse. You may also use tablets containing pseudoephedrine. Avoid products that combine ingredients, because side effects may be increased. Read labels. You can also ask the pharmacist for help. (NOTE: Persons with high blood pressure should not use decongestants. They can raise blood pressure.)  · Over-the-counter antihistamines may help if allergies contributed to your sinusitis.    · Use acetaminophen or ibuprofen to control pain, unless another pain medicine was prescribed. (If you have chronic liver or kidney disease or ever had a stomach ulcer, talk with your doctor before using these medicines. Aspirin should never be used in anyone under 18 years of age who is ill with a fever. It may cause severe liver damage.)  · Use nasal rinses or irrigation as instructed by your health care provider.  · Don't smoke. This can worsen symptoms.  Follow-up care  Follow up with your healthcare provider or our staff if you are not improving within the next week.  When to seek medical advice  Call your healthcare provider if any of these occur:  · Green or yellow discharge from the nose or into the throat  · Facial pain or headache becoming more severe  · Stiff neck  · Unusual drowsiness or confusion  · Swelling of the forehead or eyelids  · Vision problems, including blurred or double vision  · Fever of 100.4ºF (38ºC) or higher, or as directed by your healthcare  provider  · Seizure  · Breathing problems  · Symptoms not resolving within 10 days  Date Last Reviewed: 4/13/2015  © 6027-1810 [employer]. [address] [place] [address], [place] [address]. All rights reserved. This information is not intended as a substitute for professional medical care. Always follow your healthcare professional's instructions.        Self-Care for Sore Throats    Sore throats happen for many reasons, such as colds, allergies, and infections caused by viruses or bacteria. In any case, your throat becomes red and sore. Your goal for self-care is to reduce your discomfort while giving your throat a chance to heal.  Moisten and soothe your throat  Tips include the following:  · Try a sip of water first thing after waking up.  · Keep your throat moist by drinking 6 or more glasses of clear liquids every day.  · Run a cool-air humidifier in your room overnight.  · Avoid cigarette smoke.   · Suck on throat lozenges, cough drops, hard candy, ice chips, or frozen fruit-juice bars. Use the sugar-free versions if your diet or medical condition requires them.  Gargle to ease irritation  Gargling every hour or 2 can ease irritation. Try gargling with 1 of these solutions:  · 1/4 teaspoon of salt in 1/2 cup of warm water  · An over-the-counter anesthetic gargle  Use medicine for more relief  Over-the-counter medicine can reduce sore throat symptoms. Ask your pharmacist if you have questions about which medicine to use:  · Ease pain with anesthetic sprays. Aspirin or an aspirin substitute also helps. Remember, never give aspirin to anyone 18 or younger, or if you are already taking blood thinners.   · For sore throats caused by allergies, try antihistamines to block the allergic reaction.  · Remember: unless a sore throat is caused by a bacterial infection, antibiotics wont help you.  Prevent future sore throats  Prevention tips include the following:  · Stop smoking or reduce contact with secondhand  smoke. Smoke irritates the tender throat lining.  · Limit contact with pets and with allergy-causing substances, such as pollen and mold.  · When youre around someone with a sore throat or cold, wash your hands often to keep viruses or bacteria from spreading.  · Dont strain your vocal cords.  Call your healthcare provider  Contact your healthcare provider if you have:  · A temperature over 101°F (38.3°C)  · White spots on the throat  · Great difficulty swallowing  · Trouble breathing  · A skin rash  · Recent exposure to someone else with strep bacteria  · Severe hoarseness and swollen glands in the neck or jaw   Date Last Reviewed: 8/1/2016  © 2719-3804 [employer]. [address] [place] [address], [place] [address]. All rights reserved. This information is not intended as a substitute for professional medical care. Always follow your healthcare professional's instructions.

## 2019-02-20 ENCOUNTER — TELEPHONE (OUTPATIENT)
Dept: URGENT CARE | Facility: CLINIC | Age: 69
End: 2019-02-20

## 2019-03-21 ENCOUNTER — OFFICE VISIT (OUTPATIENT)
Dept: URGENT CARE | Facility: CLINIC | Age: 69
End: 2019-03-21
Payer: OTHER GOVERNMENT

## 2019-03-21 DIAGNOSIS — S33.5XXD SPRAIN OF LIGAMENTS OF LUMBAR SPINE, SUBSEQUENT ENCOUNTER: ICD-10-CM

## 2019-03-21 DIAGNOSIS — G89.29 CHRONIC BILATERAL LOW BACK PAIN WITHOUT SCIATICA: ICD-10-CM

## 2019-03-21 DIAGNOSIS — M51.36 DEGENERATION OF LUMBAR INTERVERTEBRAL DISC: Primary | ICD-10-CM

## 2019-03-21 DIAGNOSIS — M54.50 CHRONIC BILATERAL LOW BACK PAIN WITHOUT SCIATICA: ICD-10-CM

## 2019-03-21 PROCEDURE — 99214 OFFICE O/P EST MOD 30 MIN: CPT | Mod: S$GLB,,, | Performed by: PREVENTIVE MEDICINE

## 2019-03-21 PROCEDURE — 99214 PR OFFICE/OUTPT VISIT, EST, LEVL IV, 30-39 MIN: ICD-10-PCS | Mod: S$GLB,,, | Performed by: PREVENTIVE MEDICINE

## 2019-03-21 RX ORDER — IBUPROFEN 800 MG/1
800 TABLET ORAL 3 TIMES DAILY
Qty: 30 TABLET | Refills: 0 | Status: SHIPPED | OUTPATIENT
Start: 2019-03-21 | End: 2019-04-25 | Stop reason: SDUPTHER

## 2019-03-21 RX ORDER — HYDROCODONE BITARTRATE AND ACETAMINOPHEN 10; 325 MG/1; MG/1
1 TABLET ORAL EVERY 6 HOURS PRN
Qty: 90 TABLET | Refills: 0 | Status: SHIPPED | OUTPATIENT
Start: 2019-03-21 | End: 2019-04-25 | Stop reason: SDUPTHER

## 2019-03-21 NOTE — LETTER
[facility] [place]  [address]  [place] [address]  Phone: [phone]  Fax: [phone]  [facility]: [id number]    Pt Name: Sasha Michelle  Injury Date: 03/15/2001   Employee ID:  Date of Treatment: 03/21/2019   Company: [employer]      Appointment Time: 09:10 AM Arrived: 9:20AM   Provider: Miguel Staples MD Time Out: 10:40AM     Office Treatment:   EXAM  RX 2X GIVEN  DISABLED UNTIL NEXT OFFICE VISIT    1. Degeneration of lumbar intervertebral disc    2. Chronic bilateral low back pain without sciatica    3. Sprain of ligaments of lumbar spine, subsequent encounter      Medications Ordered This Encounter   Medications    HYDROcodone-acetaminophen (NORCO)  mg per tablet    ibuprofen (ADVIL,MOTRIN) 800 MG tablet      Patient Instructions: Daily home exercises/warm soaks      Restrictions: Disabled until next office visit     Return Appointment: 4/25/2019 at 9:00AM

## 2019-03-21 NOTE — PROGRESS NOTES
Subjective:       Patient ID: Sasha Michelle is a 68 y.o. female.    Chief Complaint: Back Pain (3/5/01)    Patient is a follow up for back pain from 3/5/01. Not working, meds helping, Ambulatory. MJB      Back Pain   This is a recurrent problem. The current episode started more than 1 year ago. The problem occurs daily. The problem has been gradually improving since onset. The pain is present in the lumbar spine. The quality of the pain is described as aching. The pain does not radiate. The pain is at a severity of 5/10. The pain is moderate. The symptoms are aggravated by position. Pertinent negatives include no abdominal pain, chest pain, fever or headaches. She has tried bed rest and NSAIDs for the symptoms. The treatment provided moderate relief.     Review of Systems   Constitution: Negative for chills and fever.   HENT: Negative for sore throat.    Eyes: Negative for blurred vision.   Cardiovascular: Negative for chest pain.   Respiratory: Negative for shortness of breath.    Skin: Negative for rash.   Musculoskeletal: Positive for back pain and stiffness. Negative for joint pain.   Gastrointestinal: Negative for abdominal pain, diarrhea, nausea and vomiting.   Neurological: Negative for headaches.   Psychiatric/Behavioral: The patient is not nervous/anxious.        Objective:      Physical Exam   Constitutional: She appears well-developed and well-nourished.   HENT:   Head: Normocephalic and atraumatic.   Eyes: EOM are normal. Pupils are equal, round, and reactive to light.   Neck: Normal range of motion. Neck supple.   Cardiovascular: Normal rate.   Pulmonary/Chest: Effort normal.   Musculoskeletal:        Cervical back: She exhibits decreased range of motion, tenderness and pain. She exhibits no bony tenderness, no swelling, no edema, no deformity, no laceration, no spasm and normal pulse.        Lumbar back: She exhibits decreased range of motion, tenderness, pain and spasm. She exhibits no bony  tenderness, no swelling, no edema, no deformity, no laceration and normal pulse.        Back:    Pain with palpation of low back without spasm. Pain  with flexion to 90, extension to 25 and lateral bending to 25 degrees. No motor or sensory deficits.   Patient also has pain about upper back and neck with palpation and range of motion testing. Pain with flexion of neck to 25 degrees, extension to 10 degrees and later bending to 25 degrees.   Neurological: She is alert.   No focal neurologic deficits   Skin: Skin is warm.   Psychiatric: She has a normal mood and affect. Her behavior is normal. Judgment and thought content normal.   Nursing note and vitals reviewed.      Assessment:       1. Degeneration of lumbar intervertebral disc    2. Chronic bilateral low back pain without sciatica    3. Sprain of ligaments of lumbar spine, subsequent encounter        Plan:         Medications Ordered This Encounter   Medications    HYDROcodone-acetaminophen (NORCO)  mg per tablet     Sig: Take 1 tablet by mouth every 6 (six) hours as needed for Pain.     Dispense:  90 tablet     Refill:  0    ibuprofen (ADVIL,MOTRIN) 800 MG tablet     Sig: Take 1 tablet (800 mg total) by mouth 3 (three) times daily.     Dispense:  30 tablet     Refill:  0     Patient Instructions: Daily home exercises/warm soaks   Restrictions: Disabled until next office visit  Follow-up in about 5 weeks (around 4/25/2019).

## 2019-04-25 ENCOUNTER — OFFICE VISIT (OUTPATIENT)
Dept: URGENT CARE | Facility: CLINIC | Age: 69
End: 2019-04-25
Payer: OTHER GOVERNMENT

## 2019-04-25 DIAGNOSIS — G89.29 CHRONIC BILATERAL LOW BACK PAIN WITHOUT SCIATICA: ICD-10-CM

## 2019-04-25 DIAGNOSIS — S33.5XXD SPRAIN OF LIGAMENTS OF LUMBAR SPINE, SUBSEQUENT ENCOUNTER: ICD-10-CM

## 2019-04-25 DIAGNOSIS — M54.50 CHRONIC BILATERAL LOW BACK PAIN WITHOUT SCIATICA: ICD-10-CM

## 2019-04-25 DIAGNOSIS — M51.36 DEGENERATION OF LUMBAR INTERVERTEBRAL DISC: Primary | ICD-10-CM

## 2019-04-25 PROCEDURE — 99214 PR OFFICE/OUTPT VISIT, EST, LEVL IV, 30-39 MIN: ICD-10-PCS | Mod: S$GLB,,, | Performed by: PREVENTIVE MEDICINE

## 2019-04-25 PROCEDURE — 99214 OFFICE O/P EST MOD 30 MIN: CPT | Mod: S$GLB,,, | Performed by: PREVENTIVE MEDICINE

## 2019-04-25 RX ORDER — IBUPROFEN 800 MG/1
800 TABLET ORAL 3 TIMES DAILY
Qty: 30 TABLET | Refills: 0 | Status: SHIPPED | OUTPATIENT
Start: 2019-04-25 | End: 2019-05-30 | Stop reason: SDUPTHER

## 2019-04-25 RX ORDER — HYDROCODONE BITARTRATE AND ACETAMINOPHEN 10; 325 MG/1; MG/1
1 TABLET ORAL EVERY 6 HOURS PRN
Qty: 90 TABLET | Refills: 0 | Status: SHIPPED | OUTPATIENT
Start: 2019-04-25 | End: 2019-05-30 | Stop reason: SDUPTHER

## 2019-04-25 NOTE — LETTER
[facility] [place] [address]  [place] [address]  Phone: [phone]  Fax: [phone]  [facility]: [id number]    Pt Name: Sasha Michelle  Injury Date: 03/15/2001   Employee ID:  Date of Treatment: 04/25/2019   Company: [employer]      Appointment Time: 08:45 AM Arrived: 0850 AM   Provider: Miguel Staples MD Time Out:1020 AM     Office Treatment:   EXAM  RX X 2 GIVEN  DISABLED UNTIL NEXT OFFICE VISIT    1. Degeneration of lumbar intervertebral disc    2. Sprain of ligaments of lumbar spine, subsequent encounter    3. Chronic bilateral low back pain without sciatica      Medications Ordered This Encounter   Medications    HYDROcodone-acetaminophen (NORCO)  mg per tablet    ibuprofen (ADVIL,MOTRIN) 800 MG tablet      Patient Instructions: Daily home exercises/warm soaks      Restrictions: Disabled until next office visit     Return Appointment: 5/30/2019 at 0830 AM

## 2019-05-30 ENCOUNTER — OFFICE VISIT (OUTPATIENT)
Dept: URGENT CARE | Facility: CLINIC | Age: 69
End: 2019-05-30
Payer: OTHER GOVERNMENT

## 2019-05-30 DIAGNOSIS — M54.50 CHRONIC BILATERAL LOW BACK PAIN WITHOUT SCIATICA: ICD-10-CM

## 2019-05-30 DIAGNOSIS — G89.29 CHRONIC BILATERAL LOW BACK PAIN WITHOUT SCIATICA: ICD-10-CM

## 2019-05-30 DIAGNOSIS — S33.5XXD SPRAIN OF LIGAMENTS OF LUMBAR SPINE, SUBSEQUENT ENCOUNTER: ICD-10-CM

## 2019-05-30 DIAGNOSIS — M51.36 DEGENERATION OF LUMBAR INTERVERTEBRAL DISC: Primary | ICD-10-CM

## 2019-05-30 PROCEDURE — 99214 OFFICE O/P EST MOD 30 MIN: CPT | Mod: S$GLB,,, | Performed by: PREVENTIVE MEDICINE

## 2019-05-30 PROCEDURE — 99214 PR OFFICE/OUTPT VISIT, EST, LEVL IV, 30-39 MIN: ICD-10-PCS | Mod: S$GLB,,, | Performed by: PREVENTIVE MEDICINE

## 2019-05-30 RX ORDER — HYDROCODONE BITARTRATE AND ACETAMINOPHEN 10; 325 MG/1; MG/1
1 TABLET ORAL EVERY 6 HOURS PRN
Qty: 90 TABLET | Refills: 0 | Status: SHIPPED | OUTPATIENT
Start: 2019-05-30 | End: 2019-06-27 | Stop reason: SDUPTHER

## 2019-05-30 RX ORDER — IBUPROFEN 800 MG/1
800 TABLET ORAL 3 TIMES DAILY
Qty: 30 TABLET | Refills: 0 | Status: SHIPPED | OUTPATIENT
Start: 2019-05-30 | End: 2019-06-27 | Stop reason: SDUPTHER

## 2019-05-30 NOTE — LETTER
[facility] [place]  [address]  [place] [address]  Phone: [phone]  Fax: [phone]  [facility]: [id number]    Pt Name: Sasha Michelle  Injury Date: 03/15/2001   Employee ID:  Date of Treatment: 05/30/2019   Company: [employer]      Appointment Time: 08:15 AM Arrived: 0830 AM   Provider: Miguel Staples MD Time Out: 0940 AM     Office Treatment:   EXAM  RX X 2 GIVEN  DISABLED UNTIL NEXT OFFICE VISIT    1. Degeneration of lumbar intervertebral disc    2. Chronic bilateral low back pain without sciatica    3. Sprain of ligaments of lumbar spine, subsequent encounter      Medications Ordered This Encounter   Medications    HYDROcodone-acetaminophen (NORCO)  mg per tablet    ibuprofen (ADVIL,MOTRIN) 800 MG tablet      Patient Instructions: Daily home exercises/warm soaks      Restrictions: Disabled until next office visit     Return Appointment: 7/11/2019 at 0830 AM

## 2019-05-30 NOTE — PROGRESS NOTES
Subjective:       Patient ID: Sasha Michelle is a 68 y.o. female.    Chief Complaint: Back Pain (03/05/01)    Patient is a follow up for back pain from 3/5/01. CT    Back Pain   This is a recurrent problem. The current episode started more than 1 year ago. The problem occurs daily. The problem has been gradually improving since onset. The pain is present in the lumbar spine. The quality of the pain is described as aching. The pain does not radiate. The pain is at a severity of 5/10. The pain is moderate. The symptoms are aggravated by position. Pertinent negatives include no abdominal pain, chest pain, fever or headaches. She has tried bed rest and NSAIDs for the symptoms. The treatment provided moderate relief.     Review of Systems   Constitution: Negative for chills and fever.   HENT: Negative for sore throat.    Eyes: Negative for blurred vision.   Cardiovascular: Negative for chest pain.   Respiratory: Negative for shortness of breath.    Skin: Negative for rash.   Musculoskeletal: Positive for back pain and stiffness. Negative for joint pain.   Gastrointestinal: Negative for abdominal pain, diarrhea, nausea and vomiting.   Neurological: Negative for headaches.   Psychiatric/Behavioral: The patient is not nervous/anxious.        Objective:      Physical Exam   Constitutional: She appears well-developed and well-nourished.   HENT:   Head: Normocephalic and atraumatic.   Eyes: Pupils are equal, round, and reactive to light. EOM are normal.   Neck: Normal range of motion. Neck supple.   Cardiovascular: Normal rate.   Pulmonary/Chest: Effort normal.   Musculoskeletal:        Cervical back: She exhibits decreased range of motion, tenderness and pain. She exhibits no bony tenderness, no swelling, no edema, no deformity, no laceration, no spasm and normal pulse.        Lumbar back: She exhibits decreased range of motion, tenderness, pain and spasm. She exhibits no bony tenderness, no swelling, no edema, no deformity,  no laceration and normal pulse.        Back:    Pain with palpation of low back without spasm. Pain  with flexion to 90, extension to 25 and lateral bending to 25 degrees. No motor or sensory deficits.   Patient also has pain about upper back and neck with palpation and range of motion testing. Pain with flexion of neck to 25 degrees, extension to 10 degrees and later bending to 25 degrees.   Neurological: She is alert.   No focal neurologic deficits   Skin: Skin is warm.   Psychiatric: She has a normal mood and affect. Her behavior is normal. Judgment and thought content normal.   Nursing note and vitals reviewed.      Assessment:       1. Degeneration of lumbar intervertebral disc    2. Chronic bilateral low back pain without sciatica    3. Sprain of ligaments of lumbar spine, subsequent encounter        Plan:         Medications Ordered This Encounter   Medications    HYDROcodone-acetaminophen (NORCO)  mg per tablet     Sig: Take 1 tablet by mouth every 6 (six) hours as needed for Pain.     Dispense:  90 tablet     Refill:  0    ibuprofen (ADVIL,MOTRIN) 800 MG tablet     Sig: Take 1 tablet (800 mg total) by mouth 3 (three) times daily.     Dispense:  30 tablet     Refill:  0     Patient Instructions: Daily home exercises/warm soaks   Restrictions: Disabled until next office visit  Follow up in about 6 weeks (around 7/11/2019).

## 2019-06-27 ENCOUNTER — OFFICE VISIT (OUTPATIENT)
Dept: URGENT CARE | Facility: CLINIC | Age: 69
End: 2019-06-27
Payer: OTHER GOVERNMENT

## 2019-06-27 DIAGNOSIS — G89.29 CHRONIC BILATERAL LOW BACK PAIN WITHOUT SCIATICA: ICD-10-CM

## 2019-06-27 DIAGNOSIS — S33.5XXD SPRAIN OF LIGAMENTS OF LUMBAR SPINE, SUBSEQUENT ENCOUNTER: ICD-10-CM

## 2019-06-27 DIAGNOSIS — M54.50 CHRONIC BILATERAL LOW BACK PAIN WITHOUT SCIATICA: ICD-10-CM

## 2019-06-27 DIAGNOSIS — M51.36 DEGENERATION OF LUMBAR INTERVERTEBRAL DISC: Primary | ICD-10-CM

## 2019-06-27 PROCEDURE — 99214 PR OFFICE/OUTPT VISIT, EST, LEVL IV, 30-39 MIN: ICD-10-PCS | Mod: S$GLB,,, | Performed by: PREVENTIVE MEDICINE

## 2019-06-27 PROCEDURE — 99214 OFFICE O/P EST MOD 30 MIN: CPT | Mod: S$GLB,,, | Performed by: PREVENTIVE MEDICINE

## 2019-06-27 RX ORDER — IBUPROFEN 800 MG/1
800 TABLET ORAL 3 TIMES DAILY
Qty: 60 TABLET | Refills: 1 | Status: SHIPPED | OUTPATIENT
Start: 2019-06-27 | End: 2019-08-08 | Stop reason: SDUPTHER

## 2019-06-27 RX ORDER — HYDROCODONE BITARTRATE AND ACETAMINOPHEN 10; 325 MG/1; MG/1
1 TABLET ORAL EVERY 6 HOURS PRN
Qty: 90 TABLET | Refills: 0 | Status: SHIPPED | OUTPATIENT
Start: 2019-06-27 | End: 2019-08-08 | Stop reason: SDUPTHER

## 2019-06-27 NOTE — LETTER
[facility] [place] [address]  [place] [address]  Phone: [phone]  Fax: [phone]  [facility]: [id number]    Pt Name: Sasha Michelle  Injury Date: 03/15/2001   Employee ID:  Date of Treatment: 06/27/2019   Company: [employer]      Appointment Time: 08:15 AM Arrived: 0858 AM   Provider: Miguel Staples MD Time Out:0950 AM     Office Treatment:   EXAM  RX GIVEN X 2   DISABLED UNTIL NEXT OFFICE VISIT    1. Degeneration of lumbar intervertebral disc    2. Sprain of ligaments of lumbar spine, subsequent encounter    3. Chronic bilateral low back pain without sciatica      Medications Ordered This Encounter   Medications    HYDROcodone-acetaminophen (NORCO)  mg per tablet    ibuprofen (ADVIL,MOTRIN) 800 MG tablet      Patient Instructions: Daily home exercises/warm soaks      Restrictions: Disabled until next office visit     Return Appointment: 8/6/2019 at 0830 AM

## 2019-06-27 NOTE — PROGRESS NOTES
Subjective:       Patient ID: Sasha Michelle is a 68 y.o. female.    Chief Complaint: Back Pain ( 3/5/01. )    Patient is a follow up for back pain from 3/5/01. MJB    Back Pain   This is a recurrent problem. The current episode started more than 1 year ago. The problem occurs daily. The problem is unchanged. The pain is present in the lumbar spine. The pain does not radiate. The pain is moderate. The pain is the same all the time. The symptoms are aggravated by position. Stiffness is present all day. Pertinent negatives include no abdominal pain, chest pain, fever or headaches. She has tried NSAIDs for the symptoms. The treatment provided moderate relief.     Review of Systems   Constitution: Negative for chills and fever.   HENT: Negative for sore throat.    Eyes: Negative for blurred vision.   Cardiovascular: Negative for chest pain.   Respiratory: Negative for shortness of breath.    Skin: Negative for rash.   Musculoskeletal: Positive for back pain. Negative for joint pain.   Gastrointestinal: Negative for abdominal pain, diarrhea, nausea and vomiting.   Neurological: Negative for headaches.   Psychiatric/Behavioral: The patient is not nervous/anxious.        Objective:      Physical Exam   Constitutional: She appears well-developed and well-nourished.   HENT:   Head: Normocephalic and atraumatic.   Eyes: Pupils are equal, round, and reactive to light. EOM are normal.   Neck: Normal range of motion. Neck supple.   Cardiovascular: Normal rate.   Pulmonary/Chest: Effort normal.   Musculoskeletal:        Cervical back: She exhibits decreased range of motion, tenderness and pain. She exhibits no bony tenderness, no swelling, no edema, no deformity, no laceration, no spasm and normal pulse.        Lumbar back: She exhibits decreased range of motion, tenderness, pain and spasm. She exhibits no bony tenderness, no swelling, no edema, no deformity, no laceration and normal pulse.        Back:    Pain with palpation of  low back without spasm. Pain  with flexion to 90, extension to 25 and lateral bending to 25 degrees. No motor or sensory deficits.   Patient also has pain about upper back and neck with palpation and range of motion testing. Pain with flexion of neck to 25 degrees, extension to 10 degrees and later bending to 25 degrees.   Neurological: She is alert.   No focal neurologic deficits   Skin: Skin is warm.   Psychiatric: She has a normal mood and affect. Her behavior is normal. Judgment and thought content normal.   Nursing note and vitals reviewed.      Assessment:       1. Degeneration of lumbar intervertebral disc    2. Sprain of ligaments of lumbar spine, subsequent encounter    3. Chronic bilateral low back pain without sciatica        Plan:         Medications Ordered This Encounter   Medications    HYDROcodone-acetaminophen (NORCO)  mg per tablet     Sig: Take 1 tablet by mouth every 6 (six) hours as needed for Pain.     Dispense:  90 tablet     Refill:  0    ibuprofen (ADVIL,MOTRIN) 800 MG tablet     Sig: Take 1 tablet (800 mg total) by mouth 3 (three) times daily.     Dispense:  60 tablet     Refill:  1     Patient Instructions: Daily home exercises/warm soaks   Restrictions: Disabled until next office visit  Follow up in about 6 weeks (around 8/6/2019).

## 2019-08-08 ENCOUNTER — OFFICE VISIT (OUTPATIENT)
Dept: URGENT CARE | Facility: CLINIC | Age: 69
End: 2019-08-08
Payer: OTHER GOVERNMENT

## 2019-08-08 DIAGNOSIS — M51.36 DEGENERATION OF LUMBAR INTERVERTEBRAL DISC: Primary | ICD-10-CM

## 2019-08-08 DIAGNOSIS — M54.50 CHRONIC BILATERAL LOW BACK PAIN WITHOUT SCIATICA: ICD-10-CM

## 2019-08-08 DIAGNOSIS — G89.29 CHRONIC BILATERAL LOW BACK PAIN WITHOUT SCIATICA: ICD-10-CM

## 2019-08-08 DIAGNOSIS — S33.5XXD SPRAIN OF LIGAMENTS OF LUMBAR SPINE, SUBSEQUENT ENCOUNTER: ICD-10-CM

## 2019-08-08 PROCEDURE — 99214 PR OFFICE/OUTPT VISIT, EST, LEVL IV, 30-39 MIN: ICD-10-PCS | Mod: S$GLB,,, | Performed by: PREVENTIVE MEDICINE

## 2019-08-08 PROCEDURE — 99214 OFFICE O/P EST MOD 30 MIN: CPT | Mod: S$GLB,,, | Performed by: PREVENTIVE MEDICINE

## 2019-08-08 RX ORDER — LIDOCAINE 50 MG/G
1 PATCH TOPICAL ONCE
Qty: 30 PATCH | Refills: 0 | Status: SHIPPED | OUTPATIENT
Start: 2019-08-08 | End: 2019-10-15 | Stop reason: SDUPTHER

## 2019-08-08 RX ORDER — HYDROCODONE BITARTRATE AND ACETAMINOPHEN 10; 325 MG/1; MG/1
1 TABLET ORAL EVERY 6 HOURS PRN
Qty: 90 TABLET | Refills: 0 | Status: SHIPPED | OUTPATIENT
Start: 2019-08-08 | End: 2019-09-10 | Stop reason: SDUPTHER

## 2019-08-08 RX ORDER — TIZANIDINE 4 MG/1
4 TABLET ORAL EVERY 6 HOURS PRN
Qty: 30 TABLET | Refills: 0 | Status: SHIPPED | OUTPATIENT
Start: 2019-08-08 | End: 2019-08-18

## 2019-08-08 RX ORDER — IBUPROFEN 800 MG/1
800 TABLET ORAL 3 TIMES DAILY
Qty: 60 TABLET | Refills: 1 | Status: SHIPPED | OUTPATIENT
Start: 2019-08-08 | End: 2019-09-10 | Stop reason: SDUPTHER

## 2019-08-08 NOTE — PROGRESS NOTES
Subjective:       Patient ID: Sasha Michelle is a 68 y.o. female.    Chief Complaint: Back Pain ( 3/5/01)    Patient is a follow up for back pain from 3/5/01. MJB    Back Pain   This is a recurrent problem. The current episode started more than 1 month ago. The problem occurs intermittently. The problem has been gradually improving since onset. The pain is present in the lumbar spine. The quality of the pain is described as aching. The pain does not radiate. The pain is at a severity of 5/10. The pain is mild. The symptoms are aggravated by position. Pertinent negatives include no abdominal pain, chest pain, fever or headaches. She has tried NSAIDs, muscle relaxant, bed rest and home exercises for the symptoms. The treatment provided moderate relief.     Review of Systems   Constitution: Negative for chills and fever.   HENT: Negative for sore throat.    Eyes: Negative for blurred vision.   Cardiovascular: Negative for chest pain.   Respiratory: Negative for shortness of breath.    Skin: Negative for rash.   Musculoskeletal: Positive for back pain. Negative for joint pain.   Gastrointestinal: Negative for abdominal pain, diarrhea, nausea and vomiting.   Neurological: Negative for headaches.   Psychiatric/Behavioral: The patient is not nervous/anxious.        Objective:      Physical Exam   Constitutional: She appears well-developed and well-nourished.   HENT:   Head: Normocephalic and atraumatic.   Eyes: Pupils are equal, round, and reactive to light. EOM are normal.   Neck: Normal range of motion. Neck supple.   Cardiovascular: Normal rate.   Pulmonary/Chest: Effort normal.   Musculoskeletal:        Cervical back: She exhibits decreased range of motion, tenderness and pain. She exhibits no bony tenderness, no swelling, no edema, no deformity, no laceration, no spasm and normal pulse.        Lumbar back: She exhibits decreased range of motion, tenderness, pain and spasm. She exhibits no bony tenderness, no  swelling, no edema, no deformity, no laceration and normal pulse.        Back:    Pain persists  with palpation of mid and  low back without spasm. Pain  with flexion to 90, extension to 25 and lateral bending to 25 degrees. No motor or sensory deficits.   Patient also has pain about thoracic spine with palpation and range of motion testing. Pain with flexion of neck to 25 degrees, extension to 10 degrees and later bending to 25 degrees.   Neurological: She is alert.   No focal neurologic deficits   Skin: Skin is warm.   Psychiatric: She has a normal mood and affect. Her behavior is normal. Judgment and thought content normal.   Nursing note and vitals reviewed.      Assessment:       1. Degeneration of lumbar intervertebral disc    2. Sprain of ligaments of lumbar spine, subsequent encounter    3. Chronic bilateral low back pain without sciatica        Plan:         Medications Ordered This Encounter   Medications    HYDROcodone-acetaminophen (NORCO)  mg per tablet     Sig: Take 1 tablet by mouth every 6 (six) hours as needed for Pain.     Dispense:  90 tablet     Refill:  0    ibuprofen (ADVIL,MOTRIN) 800 MG tablet     Sig: Take 1 tablet (800 mg total) by mouth 3 (three) times daily.     Dispense:  60 tablet     Refill:  1    lidocaine (LIDODERM) 5 %     Sig: Place 1 patch onto the skin once. for 1 dose     Dispense:  30 patch     Refill:  0    tiZANidine (ZANAFLEX) 4 MG tablet     Sig: Take 1 tablet (4 mg total) by mouth every 6 (six) hours as needed.     Dispense:  30 tablet     Refill:  0     Patient Instructions: Daily home exercises/warm soaks   Restrictions: Disabled until next office visit  Follow up in about 5 weeks (around 9/12/2019).

## 2019-08-08 NOTE — LETTER
[facility] [place]  [address] [place] [address]  Phone: [phone]  Fax: [phone]  [facility]: [id number]    Pt Name: Sasha Michelle  Injury Date: 03/15/2001   Employee ID:  Date of Treatment: 08/08/2019   Company: [employer]      Appointment Time: 02:45 PM Arrived: 1347 PM   Provider: Miguel Staples MD Time Out: 1505 PM     Office Treatment:     EXAM  PRESCRIPTIONS GIVEN TO PT  FOLLOW UP APPT SCHEDULED  DISABLED UNTIL NEXT VISIT    1. Degeneration of lumbar intervertebral disc    2. Sprain of ligaments of lumbar spine, subsequent encounter    3. Chronic bilateral low back pain without sciatica      Medications Ordered This Encounter   Medications    HYDROcodone-acetaminophen (NORCO)  mg per tablet    ibuprofen (ADVIL,MOTRIN) 800 MG tablet    lidocaine (LIDODERM) 5 %    tiZANidine (ZANAFLEX) 4 MG tablet      Patient Instructions: Daily home exercises/warm soaks      Restrictions: Disabled until next office visit     Return Appointment: 9/12/2019 at 0900 AM  [place]

## 2019-09-10 ENCOUNTER — OFFICE VISIT (OUTPATIENT)
Dept: URGENT CARE | Facility: CLINIC | Age: 69
End: 2019-09-10
Payer: OTHER GOVERNMENT

## 2019-09-10 DIAGNOSIS — M54.50 CHRONIC BILATERAL LOW BACK PAIN WITHOUT SCIATICA: ICD-10-CM

## 2019-09-10 DIAGNOSIS — G89.29 CHRONIC BILATERAL LOW BACK PAIN WITHOUT SCIATICA: ICD-10-CM

## 2019-09-10 DIAGNOSIS — S33.5XXD SPRAIN OF LIGAMENTS OF LUMBAR SPINE, SUBSEQUENT ENCOUNTER: ICD-10-CM

## 2019-09-10 DIAGNOSIS — M51.36 DEGENERATION OF LUMBAR INTERVERTEBRAL DISC: Primary | ICD-10-CM

## 2019-09-10 PROCEDURE — 99214 OFFICE O/P EST MOD 30 MIN: CPT | Mod: S$GLB,,, | Performed by: PREVENTIVE MEDICINE

## 2019-09-10 PROCEDURE — 99214 PR OFFICE/OUTPT VISIT, EST, LEVL IV, 30-39 MIN: ICD-10-PCS | Mod: S$GLB,,, | Performed by: PREVENTIVE MEDICINE

## 2019-09-10 RX ORDER — HYDROCODONE BITARTRATE AND ACETAMINOPHEN 10; 325 MG/1; MG/1
1 TABLET ORAL EVERY 6 HOURS PRN
Qty: 90 TABLET | Refills: 0 | Status: SHIPPED | OUTPATIENT
Start: 2019-09-10 | End: 2019-10-15 | Stop reason: SDUPTHER

## 2019-09-10 RX ORDER — IBUPROFEN 800 MG/1
800 TABLET ORAL 3 TIMES DAILY
Qty: 60 TABLET | Refills: 1 | Status: SHIPPED | OUTPATIENT
Start: 2019-09-10 | End: 2019-10-15 | Stop reason: SDUPTHER

## 2019-09-10 NOTE — LETTER
[facility] [place] [address]  [place] [address]  Phone: [phone]  Fax: [phone]  [facility]: [id number]    Pt Name: Sasha Michelle  Injury Date: 03/15/2001   Employee ID:  Date of Treatment: 09/10/2019   Company: [employer]      Appointment Time: 08:55 AM Arrived: 0906 AM   Provider: [facility] Time Out: 1115 AM     Office Treatment:     EXAM  PRESCRIPTIONS GIVEN TO PT  FOLLOW UP APPT SCHEDULED  DISABLED    1. Degeneration of lumbar intervertebral disc    2. Sprain of ligaments of lumbar spine, subsequent encounter    3. Chronic bilateral low back pain without sciatica      Medications Ordered This Encounter   Medications    HYDROcodone-acetaminophen (NORCO)  mg per tablet    ibuprofen (ADVIL,MOTRIN) 800 MG tablet      Patient Instructions: Daily home exercises/warm soaks      Restrictions: Disabled until next office visit     Return Appointment: 10/15/2019 at 0900 AM  AL[place]

## 2019-09-10 NOTE — PROGRESS NOTES
Subjective:       Patient ID: Sasha Michelle is a 68 y.o. female.    Chief Complaint: Back Pain    Patient is a follow up for back pain from 3/5/01. IJ    Back Pain   This is a recurrent problem. The current episode started more than 1 month ago. The problem occurs intermittently. The problem has been gradually improving since onset. The pain is present in the lumbar spine. The quality of the pain is described as aching. The pain does not radiate. The pain is at a severity of 4/10. The pain is mild. The symptoms are aggravated by position. Pertinent negatives include no abdominal pain, chest pain, fever or headaches. She has tried NSAIDs, muscle relaxant, bed rest and home exercises for the symptoms. The treatment provided moderate relief.     Review of Systems   Constitution: Negative for chills and fever.   HENT: Negative for sore throat.    Eyes: Negative for blurred vision.   Cardiovascular: Negative for chest pain.   Respiratory: Negative for shortness of breath.    Skin: Negative for rash.   Musculoskeletal: Positive for back pain. Negative for joint pain.   Gastrointestinal: Negative for abdominal pain, diarrhea, nausea and vomiting.   Neurological: Negative for headaches.   Psychiatric/Behavioral: The patient is not nervous/anxious.        Objective:      Physical Exam   Constitutional: She appears well-developed and well-nourished.   HENT:   Head: Normocephalic and atraumatic.   Eyes: Pupils are equal, round, and reactive to light. EOM are normal.   Neck: Normal range of motion. Neck supple.   Cardiovascular: Normal rate.   Pulmonary/Chest: Effort normal.   Musculoskeletal:        Cervical back: She exhibits decreased range of motion, tenderness and pain. She exhibits no bony tenderness, no swelling, no edema, no deformity, no laceration, no spasm and normal pulse.        Lumbar back: She exhibits decreased range of motion, tenderness, pain and spasm. She exhibits no bony tenderness, no swelling, no edema,  no deformity, no laceration and normal pulse.        Back:    Pain persists  with palpation of mid and  low back without spasm. Pain  with flexion to 90, extension to 25 and lateral bending to 25 degrees. No motor or sensory deficits.   Patient also has pain about thoracic spine with palpation and range of motion testing. Pain with flexion of neck to 25 degrees, extension to 10 degrees and later bending to 25 degrees.   Neurological: She is alert.   No focal neurologic deficits   Skin: Skin is warm.   Psychiatric: She has a normal mood and affect. Her behavior is normal. Judgment and thought content normal.   Nursing note and vitals reviewed.      Assessment:       1. Degeneration of lumbar intervertebral disc    2. Sprain of ligaments of lumbar spine, subsequent encounter    3. Chronic bilateral low back pain without sciatica        Plan:     Continue taking Tizanidine and Lidocaine patches as needed.    Medications Ordered This Encounter   Medications    HYDROcodone-acetaminophen (NORCO)  mg per tablet     Sig: Take 1 tablet by mouth every 6 (six) hours as needed for Pain.     Dispense:  90 tablet     Refill:  0    ibuprofen (ADVIL,MOTRIN) 800 MG tablet     Sig: Take 1 tablet (800 mg total) by mouth 3 (three) times daily.     Dispense:  60 tablet     Refill:  1     Patient Instructions: Daily home exercises/warm soaks   Restrictions: Disabled until next office visit  Follow up in about 5 weeks (around 10/15/2019).

## 2019-10-06 DIAGNOSIS — N93.0 PCB (POST COITAL BLEEDING): ICD-10-CM

## 2019-10-07 RX ORDER — ESTRADIOL 0.1 MG/G
CREAM VAGINAL
Qty: 42.5 G | Refills: 11 | Status: SHIPPED | OUTPATIENT
Start: 2019-10-07 | End: 2021-02-01

## 2019-10-15 ENCOUNTER — OFFICE VISIT (OUTPATIENT)
Dept: URGENT CARE | Facility: CLINIC | Age: 69
End: 2019-10-15
Payer: OTHER GOVERNMENT

## 2019-10-15 DIAGNOSIS — M54.50 CHRONIC BILATERAL LOW BACK PAIN WITHOUT SCIATICA: ICD-10-CM

## 2019-10-15 DIAGNOSIS — M47.814 SPONDYLOSIS OF THORACIC REGION WITHOUT MYELOPATHY OR RADICULOPATHY: ICD-10-CM

## 2019-10-15 DIAGNOSIS — S33.5XXD SPRAIN OF LIGAMENTS OF LUMBAR SPINE, SUBSEQUENT ENCOUNTER: ICD-10-CM

## 2019-10-15 DIAGNOSIS — M51.36 DEGENERATION OF LUMBAR INTERVERTEBRAL DISC: Primary | ICD-10-CM

## 2019-10-15 DIAGNOSIS — G89.29 CHRONIC CERVICAL PAIN: ICD-10-CM

## 2019-10-15 DIAGNOSIS — M54.2 CHRONIC CERVICAL PAIN: ICD-10-CM

## 2019-10-15 DIAGNOSIS — G89.29 CHRONIC BILATERAL LOW BACK PAIN WITHOUT SCIATICA: ICD-10-CM

## 2019-10-15 PROCEDURE — 99214 OFFICE O/P EST MOD 30 MIN: CPT | Mod: S$GLB,,, | Performed by: PREVENTIVE MEDICINE

## 2019-10-15 PROCEDURE — 99214 PR OFFICE/OUTPT VISIT, EST, LEVL IV, 30-39 MIN: ICD-10-PCS | Mod: S$GLB,,, | Performed by: PREVENTIVE MEDICINE

## 2019-10-15 RX ORDER — TIZANIDINE 4 MG/1
4 TABLET ORAL EVERY 6 HOURS PRN
Qty: 30 TABLET | Refills: 1 | Status: SHIPPED | OUTPATIENT
Start: 2019-10-15 | End: 2019-10-25

## 2019-10-15 RX ORDER — IBUPROFEN 800 MG/1
800 TABLET ORAL 3 TIMES DAILY
Qty: 60 TABLET | Refills: 1 | Status: SHIPPED | OUTPATIENT
Start: 2019-10-15 | End: 2019-11-21 | Stop reason: SDUPTHER

## 2019-10-15 RX ORDER — LIDOCAINE 50 MG/G
1 PATCH TOPICAL ONCE
Qty: 30 PATCH | Refills: 0 | Status: SHIPPED | OUTPATIENT
Start: 2019-10-15 | End: 2019-11-21 | Stop reason: SDUPTHER

## 2019-10-15 RX ORDER — HYDROCODONE BITARTRATE AND ACETAMINOPHEN 10; 325 MG/1; MG/1
1 TABLET ORAL EVERY 6 HOURS PRN
Qty: 90 TABLET | Refills: 0 | Status: SHIPPED | OUTPATIENT
Start: 2019-10-15 | End: 2019-11-21 | Stop reason: SDUPTHER

## 2019-10-15 NOTE — LETTER
[facility] [place] [address]  [place] [address]  Phone: [phone]  Fax: [phone]  [facility]: [id number]    Pt Name: Sasha Michelle  Injury Date: 03/15/2001   Employee ID: [id number] Date of Treatment: 10/15/2019   Company: [employer]      Appointment Time: 08:45 AM Arrived: 8:54 AM   Provider: Miguel Staples MD Time Out: 10:00 AM     Office Treatment:   1. Degeneration of lumbar intervertebral disc    2. Sprain of ligaments of lumbar spine, subsequent encounter    3. Chronic bilateral low back pain without sciatica    4. Spondylosis of thoracic region without myelopathy or radiculopathy    5. Chronic cervical pain      Medications Ordered This Encounter   Medications    HYDROcodone-acetaminophen (NORCO)  mg per tablet    ibuprofen (ADVIL,MOTRIN) 800 MG tablet    lidocaine (LIDODERM) 5 %    tiZANidine (ZANAFLEX) 4 MG tablet      Patient Instructions: Daily home exercises/warm soaks    Restrictions: Disabled until next office visit     Return Appointment: 11/19/2019 at 9:30 AM  [place]

## 2019-10-15 NOTE — PROGRESS NOTES
Subjective:       Patient ID: Sasha Michelle is a 68 y.o. female.    Chief Complaint: Back Pain    Patient is a follow up for back pain from 3/5/01. Pt need refills on all medication. IJ    Back Pain   This is a recurrent problem. The current episode started more than 1 month ago. The problem occurs intermittently. The problem has been gradually improving since onset. The pain is present in the lumbar spine. The quality of the pain is described as aching. The pain does not radiate. The pain is at a severity of 4/10. The pain is mild. The symptoms are aggravated by position. Pertinent negatives include no abdominal pain, chest pain, fever or headaches. She has tried NSAIDs, muscle relaxant, bed rest and home exercises for the symptoms. The treatment provided moderate relief.     Review of Systems   Constitution: Negative for chills and fever.   HENT: Negative for sore throat.    Eyes: Negative for blurred vision.   Cardiovascular: Negative for chest pain.   Respiratory: Negative for shortness of breath.    Skin: Negative for rash.   Musculoskeletal: Positive for back pain. Negative for joint pain.   Gastrointestinal: Negative for abdominal pain, diarrhea, nausea and vomiting.   Neurological: Negative for headaches.   Psychiatric/Behavioral: The patient is not nervous/anxious.        Objective:      Physical Exam   Constitutional: She appears well-developed and well-nourished.   HENT:   Head: Normocephalic and atraumatic.   Eyes: Pupils are equal, round, and reactive to light. EOM are normal.   Neck: Normal range of motion. Neck supple.   Cardiovascular: Normal rate.   Pulmonary/Chest: Effort normal.   Musculoskeletal:        Cervical back: She exhibits decreased range of motion, tenderness and pain. She exhibits no bony tenderness, no swelling, no edema, no deformity, no laceration, no spasm and normal pulse.        Lumbar back: She exhibits decreased range of motion, tenderness, pain and spasm. She exhibits no bony  tenderness, no swelling, no edema, no deformity, no laceration and normal pulse.        Back:    Pain persists and is unchanged with palpation of mid and  low back without spasm. Pain  with flexion to 90, extension to 25 and lateral bending to 25 degrees. No motor or sensory deficits.   Patient also has pain about thoracic spine with palpation and range of motion testing. Pain with flexion of neck to 25 degrees, extension to 10 degrees and later bending to 25 degrees.   Neurological: She is alert.   No focal neurologic deficits   Skin: Skin is warm.   Psychiatric: She has a normal mood and affect. Her behavior is normal. Judgment and thought content normal.   Nursing note and vitals reviewed.      Assessment:       1. Degeneration of lumbar intervertebral disc    2. Sprain of ligaments of lumbar spine, subsequent encounter    3. Chronic bilateral low back pain without sciatica    4. Spondylosis of thoracic region without myelopathy or radiculopathy    5. Chronic cervical pain        Plan:         Medications Ordered This Encounter   Medications    HYDROcodone-acetaminophen (NORCO)  mg per tablet     Sig: Take 1 tablet by mouth every 6 (six) hours as needed for Pain.     Dispense:  90 tablet     Refill:  0     Quantity prescribed more than 7 day supply? Yes, quantity medically necessary    ibuprofen (ADVIL,MOTRIN) 800 MG tablet     Sig: Take 1 tablet (800 mg total) by mouth 3 (three) times daily.     Dispense:  60 tablet     Refill:  1    lidocaine (LIDODERM) 5 %     Sig: Place 1 patch onto the skin once. for 1 dose     Dispense:  30 patch     Refill:  0    tiZANidine (ZANAFLEX) 4 MG tablet     Sig: Take 1 tablet (4 mg total) by mouth every 6 (six) hours as needed.     Dispense:  30 tablet     Refill:  1     Patient Instructions: Daily home exercises/warm soaks   Restrictions: Disabled until next office visit  Follow up in about 5 weeks (around 11/19/2019).

## 2019-11-21 ENCOUNTER — OFFICE VISIT (OUTPATIENT)
Dept: URGENT CARE | Facility: CLINIC | Age: 69
End: 2019-11-21
Payer: OTHER GOVERNMENT

## 2019-11-21 DIAGNOSIS — S33.5XXD SPRAIN OF LIGAMENTS OF LUMBAR SPINE, SUBSEQUENT ENCOUNTER: ICD-10-CM

## 2019-11-21 DIAGNOSIS — M54.50 CHRONIC BILATERAL LOW BACK PAIN WITHOUT SCIATICA: ICD-10-CM

## 2019-11-21 DIAGNOSIS — M51.36 DEGENERATION OF LUMBAR INTERVERTEBRAL DISC: Primary | ICD-10-CM

## 2019-11-21 DIAGNOSIS — G89.29 CHRONIC CERVICAL PAIN: ICD-10-CM

## 2019-11-21 DIAGNOSIS — G89.29 CHRONIC BILATERAL LOW BACK PAIN WITHOUT SCIATICA: ICD-10-CM

## 2019-11-21 DIAGNOSIS — M54.2 CHRONIC CERVICAL PAIN: ICD-10-CM

## 2019-11-21 PROCEDURE — 1159F PR MEDICATION LIST DOCUMENTED IN MEDICAL RECORD: ICD-10-PCS | Mod: S$GLB,,, | Performed by: PREVENTIVE MEDICINE

## 2019-11-21 PROCEDURE — 99214 OFFICE O/P EST MOD 30 MIN: CPT | Mod: S$GLB,,, | Performed by: PREVENTIVE MEDICINE

## 2019-11-21 PROCEDURE — 99214 PR OFFICE/OUTPT VISIT, EST, LEVL IV, 30-39 MIN: ICD-10-PCS | Mod: S$GLB,,, | Performed by: PREVENTIVE MEDICINE

## 2019-11-21 PROCEDURE — 1159F MED LIST DOCD IN RCRD: CPT | Mod: S$GLB,,, | Performed by: PREVENTIVE MEDICINE

## 2019-11-21 RX ORDER — IBUPROFEN 800 MG/1
800 TABLET ORAL 3 TIMES DAILY
Qty: 60 TABLET | Refills: 1 | Status: SHIPPED | OUTPATIENT
Start: 2019-11-21 | End: 2019-12-17 | Stop reason: SDUPTHER

## 2019-11-21 RX ORDER — LIDOCAINE 50 MG/G
1 PATCH TOPICAL ONCE
Qty: 30 PATCH | Refills: 0 | Status: SHIPPED | OUTPATIENT
Start: 2019-11-21 | End: 2019-12-17 | Stop reason: SDUPTHER

## 2019-11-21 RX ORDER — HYDROCODONE BITARTRATE AND ACETAMINOPHEN 10; 325 MG/1; MG/1
1 TABLET ORAL EVERY 6 HOURS PRN
Qty: 90 TABLET | Refills: 0 | Status: SHIPPED | OUTPATIENT
Start: 2019-11-21 | End: 2019-12-17 | Stop reason: SDUPTHER

## 2019-11-21 NOTE — PROGRESS NOTES
Subjective:       Patient ID: Sasha Michelle is a 68 y.o. female.    Chief Complaint: Back Pain (Lower and Upper)    W/C f/u- Lower and Upper Back Pain. Patient is a follow up for back pain from 3/5/01. She states her back is feel slightly better with a pain level 4/10 on today. However, patient has pain in her upper and lower back and describes it as a burning and throbbing pain, very uncomfortable. She is still taking Hydrocodone, Ibuprofen prn and Tizanidine at night. [place]    Back Pain   This is a recurrent problem. The current episode started more than 1 year ago. The problem occurs intermittently. The problem is unchanged. The pain is present in the lumbar spine. The quality of the pain is described as burning (throbbing). The pain does not radiate. The pain is at a severity of 4/10. The pain is moderate. The pain is the same all the time. The symptoms are aggravated by sitting. Pertinent negatives include no abdominal pain, bladder incontinence, bowel incontinence, chest pain, dysuria, fever, headaches, leg pain, numbness, paresis, paresthesias, pelvic pain, perianal numbness, tingling, weakness or weight loss. She has tried muscle relaxant, NSAIDs and home exercises for the symptoms. The treatment provided mild relief.     Review of Systems   Constitution: Negative for fever and weight loss.   HENT: Negative.    Eyes: Negative.    Cardiovascular: Negative for chest pain.   Respiratory: Negative.    Endocrine: Negative.    Hematologic/Lymphatic: Negative.    Skin: Negative.    Musculoskeletal: Positive for back pain.   Gastrointestinal: Negative for abdominal pain and bowel incontinence.   Genitourinary: Negative for bladder incontinence, dysuria and pelvic pain.   Neurological: Negative.  Negative for headaches, numbness, paresthesias, tingling and weakness.   Psychiatric/Behavioral: Negative.        Objective:      Physical Exam   Constitutional: She appears well-developed and well-nourished.   HENT:   Head:  Normocephalic and atraumatic.   Eyes: Pupils are equal, round, and reactive to light. EOM are normal.   Neck: Normal range of motion. Neck supple.   Cardiovascular: Normal rate.   Pulmonary/Chest: Effort normal.   Musculoskeletal:        Cervical back: She exhibits decreased range of motion, tenderness and pain. She exhibits no bony tenderness, no swelling, no edema, no deformity, no laceration, no spasm and normal pulse.        Lumbar back: She exhibits decreased range of motion, tenderness, pain and spasm. She exhibits no bony tenderness, no swelling, no edema, no deformity, no laceration and normal pulse.        Back:    Pain persists and is unchanged with palpation of mid and  low back without spasm. Pain  with flexion to 90, extension to 25 and lateral bending to 25 degrees. No motor or sensory deficits.   Patient also has pain about thoracic spine with palpation and range of motion testing. Pain with flexion of neck to 25 degrees, extension to 10 degrees and later bending to 25 degrees.   Neurological: She is alert.   No focal neurologic deficits   Skin: Skin is warm.   Psychiatric: She has a normal mood and affect. Her behavior is normal. Judgment and thought content normal.   Nursing note and vitals reviewed.      Assessment:       1. Degeneration of lumbar intervertebral disc    2. Sprain of ligaments of lumbar spine, subsequent encounter    3. Chronic bilateral low back pain without sciatica    4. Chronic cervical pain        Plan:         Medications Ordered This Encounter   Medications    HYDROcodone-acetaminophen (NORCO)  mg per tablet     Sig: Take 1 tablet by mouth every 6 (six) hours as needed for Pain.     Dispense:  90 tablet     Refill:  0     Quantity prescribed more than 7 day supply? Yes, quantity medically necessary    ibuprofen (ADVIL,MOTRIN) 800 MG tablet     Sig: Take 1 tablet (800 mg total) by mouth 3 (three) times daily.     Dispense:  60 tablet     Refill:  1    lidocaine  (LIDODERM) 5 %     Sig: Place 1 patch onto the skin once. for 1 dose     Dispense:  30 patch     Refill:  0     Patient Instructions: Daily home exercises/warm soaks   Restrictions: Disabled until next office visit  Follow up in about 26 days (around 12/17/2019).

## 2019-11-21 NOTE — LETTER
[facility] [place] [address] [place] [address]  Phone: [phone]  Fax: [phone]  [facility]: [id number]    Pt Name: Sasha Michelle  Injury Date: 03/15/2001   Employee ID: [id number] Date of  Treatment: 11/21/2019   Company: [employer]      Appointment Time: 08:40 AM Arrived: 8:55 AM   Provider: Miguel Staples MD Time Out: 10:50 AM     Office Treatment:   EXAM  RX X3  DISABLED UNTIL NEXT OFFICE VISIT     1. Degeneration of lumbar intervertebral disc    2. Sprain of ligaments of lumbar spine, subsequent encounter    3. Chronic bilateral low back pain without sciatica    4. Chronic cervical pain      Medications Ordered This Encounter   Medications    HYDROcodone-acetaminophen (NORCO)  mg per tablet    ibuprofen (ADVIL,MOTRIN) 800 MG tablet    lidocaine (LIDODERM) 5 %      Patient Instructions: Daily home exercises/warm soaks    Restrictions: Disabled until next office visit     Return Appointment: 12/17/2019 at 11:00 AM  [place]

## 2019-12-17 ENCOUNTER — OFFICE VISIT (OUTPATIENT)
Dept: URGENT CARE | Facility: CLINIC | Age: 69
End: 2019-12-17
Payer: OTHER GOVERNMENT

## 2019-12-17 DIAGNOSIS — S33.5XXD SPRAIN OF LIGAMENTS OF LUMBAR SPINE, SUBSEQUENT ENCOUNTER: ICD-10-CM

## 2019-12-17 DIAGNOSIS — M47.814 SPONDYLOSIS OF THORACIC REGION WITHOUT MYELOPATHY OR RADICULOPATHY: ICD-10-CM

## 2019-12-17 DIAGNOSIS — M54.50 CHRONIC BILATERAL LOW BACK PAIN WITHOUT SCIATICA: ICD-10-CM

## 2019-12-17 DIAGNOSIS — G89.29 CHRONIC BILATERAL LOW BACK PAIN WITHOUT SCIATICA: ICD-10-CM

## 2019-12-17 DIAGNOSIS — M54.2 CHRONIC CERVICAL PAIN: ICD-10-CM

## 2019-12-17 DIAGNOSIS — M51.36 DEGENERATION OF LUMBAR INTERVERTEBRAL DISC: Primary | ICD-10-CM

## 2019-12-17 DIAGNOSIS — G89.29 CHRONIC CERVICAL PAIN: ICD-10-CM

## 2019-12-17 PROCEDURE — 99214 OFFICE O/P EST MOD 30 MIN: CPT | Mod: S$GLB,,, | Performed by: PREVENTIVE MEDICINE

## 2019-12-17 PROCEDURE — 99214 PR OFFICE/OUTPT VISIT, EST, LEVL IV, 30-39 MIN: ICD-10-PCS | Mod: S$GLB,,, | Performed by: PREVENTIVE MEDICINE

## 2019-12-17 RX ORDER — LIDOCAINE 50 MG/G
1 PATCH TOPICAL ONCE
Qty: 30 PATCH | Refills: 0 | Status: SHIPPED | OUTPATIENT
Start: 2019-12-17 | End: 2020-01-14 | Stop reason: SDUPTHER

## 2019-12-17 RX ORDER — HYDROCODONE BITARTRATE AND ACETAMINOPHEN 10; 325 MG/1; MG/1
1 TABLET ORAL EVERY 6 HOURS PRN
Qty: 90 TABLET | Refills: 0 | Status: SHIPPED | OUTPATIENT
Start: 2019-12-17 | End: 2020-01-14 | Stop reason: SDUPTHER

## 2019-12-17 RX ORDER — HYDROCODONE BITARTRATE AND ACETAMINOPHEN 10; 325 MG/1; MG/1
1 TABLET ORAL EVERY 6 HOURS PRN
Qty: 90 TABLET | Refills: 0 | Status: SHIPPED | OUTPATIENT
Start: 2019-12-17 | End: 2019-12-17 | Stop reason: SDUPTHER

## 2019-12-17 RX ORDER — IBUPROFEN 800 MG/1
800 TABLET ORAL 3 TIMES DAILY
Qty: 60 TABLET | Refills: 1 | Status: SHIPPED | OUTPATIENT
Start: 2019-12-17 | End: 2020-03-10 | Stop reason: SDUPTHER

## 2019-12-17 NOTE — PROGRESS NOTES
Subjective:       Patient ID: Sasha Michelle is a 68 y.o. female.    Chief Complaint: Back Pain    W/C f/u- Lower and Upper Back Pain. Patient is a follow up for back pain from 3/5/01. She states her back is feel slightly better with a pain level 4/10 on today. However, patient has pain in her upper and lower back and describes it as a burning and throbbing pain. IJ    Back Pain   This is a recurrent problem. The current episode started more than 1 year ago. The problem occurs intermittently. The problem is unchanged. The pain is present in the lumbar spine. The quality of the pain is described as burning (throbbing). The pain does not radiate. The pain is at a severity of 4/10. The pain is moderate. The pain is the same all the time. The symptoms are aggravated by sitting. Pertinent negatives include no abdominal pain, bladder incontinence, bowel incontinence, chest pain, dysuria, fever, headaches, leg pain, numbness, paresis, paresthesias, pelvic pain, perianal numbness, tingling, weakness or weight loss. She has tried muscle relaxant, NSAIDs and home exercises for the symptoms. The treatment provided mild relief.       Constitution: Negative for fever.   Cardiovascular: Negative for chest pain.   Gastrointestinal: Negative for abdominal pain and bowel incontinence.   Genitourinary: Negative for dysuria, bladder incontinence and pelvic pain.   Musculoskeletal: Positive for back pain.   Neurological: Negative for headaches and numbness.        Objective:      Physical Exam   Constitutional: She appears well-developed and well-nourished.   HENT:   Head: Normocephalic and atraumatic.   Eyes: Pupils are equal, round, and reactive to light. EOM are normal.   Neck: Normal range of motion. Neck supple.   Cardiovascular: Normal rate.   Pulmonary/Chest: Effort normal.   Musculoskeletal:        Cervical back: She exhibits decreased range of motion, tenderness and pain. She exhibits no bony tenderness, no swelling, no  edema, no deformity, no laceration, no spasm and normal pulse.        Lumbar back: She exhibits decreased range of motion, tenderness, pain and spasm. She exhibits no bony tenderness, no swelling, no edema, no deformity, no laceration and normal pulse.        Back:    Pain persists and is unchanged with palpation of mid and  low back without spasm. Pain  with flexion to 90, extension to 25 and lateral bending to 25 degrees. No motor or sensory deficits.   Patient also has pain about thoracic spine with palpation and range of motion testing. Pain with flexion of neck to 25 degrees, extension to 10 degrees and later bending to 25 degrees.   Neurological: She is alert.   No focal neurologic deficits   Skin: Skin is warm.   Psychiatric: She has a normal mood and affect. Her behavior is normal. Judgment and thought content normal.   Nursing note and vitals reviewed.      Assessment:       1. Degeneration of lumbar intervertebral disc    2. Sprain of ligaments of lumbar spine, subsequent encounter    3. Chronic bilateral low back pain without sciatica    4. Chronic cervical pain    5. Spondylosis of thoracic region without myelopathy or radiculopathy        Plan:         Medications Ordered This Encounter   Medications    HYDROcodone-acetaminophen (NORCO)  mg per tablet     Sig: Take 1 tablet by mouth every 6 (six) hours as needed for Pain.     Dispense:  90 tablet     Refill:  0     Quantity prescribed more than 7 day supply? Yes, quantity medically necessary    ibuprofen (ADVIL,MOTRIN) 800 MG tablet     Sig: Take 1 tablet (800 mg total) by mouth 3 (three) times daily.     Dispense:  60 tablet     Refill:  1    lidocaine (LIDODERM) 5 %     Sig: Place 1 patch onto the skin once. for 1 dose     Dispense:  30 patch     Refill:  0     Patient Instructions: Daily home exercises/warm soaks   Restrictions: Disabled until next office visit  Follow up in about 4 weeks (around 1/14/2020).

## 2019-12-17 NOTE — LETTER
[facility]  [address] [place] [address]  Phone: [phone]  Fax: [phone]  [facility]: [id number]    Pt Name: Sasha Michelle  Injury Date: 03/15/2001   Employee ID: [id number] Date of Treatment: 12/17/2019   Company: [employer]      Appointment Time: 10:45 AM Arrived: 4:15 p.m.   Provider: Miguel Staples MD Time Out: 4:55 p.m.     Office Treatment:   EXAM  Restrictions: Disabled until next office visit  Patient Instructions: Daily home exercises/warm soaks  Prescriptions Given    1. Degeneration of lumbar intervertebral disc    2. Sprain of ligaments of lumbar spine, subsequent encounter    3. Chronic bilateral low back pain without sciatica    4. Chronic cervical pain    5. Spondylosis of thoracic region without myelopathy or radiculopathy      Medications Ordered This Encounter   Medications    HYDROcodone-acetaminophen (NORCO)  mg per tablet    ibuprofen (ADVIL,MOTRIN) 800 MG tablet    lidocaine (LIDODERM) 5 %      Patient Instructions: Daily home exercises/warm soaks    Restrictions: Disabled until next office visit     Return Appointment: 1/14/2020 at 2:00 p.m.  [place]

## 2020-01-14 ENCOUNTER — OFFICE VISIT (OUTPATIENT)
Dept: URGENT CARE | Facility: CLINIC | Age: 70
End: 2020-01-14
Payer: OTHER GOVERNMENT

## 2020-01-14 DIAGNOSIS — M54.2 CHRONIC CERVICAL PAIN: ICD-10-CM

## 2020-01-14 DIAGNOSIS — M54.50 CHRONIC BILATERAL LOW BACK PAIN WITHOUT SCIATICA: ICD-10-CM

## 2020-01-14 DIAGNOSIS — G89.29 CHRONIC CERVICAL PAIN: ICD-10-CM

## 2020-01-14 DIAGNOSIS — S33.5XXD SPRAIN OF LIGAMENTS OF LUMBAR SPINE, SUBSEQUENT ENCOUNTER: ICD-10-CM

## 2020-01-14 DIAGNOSIS — M47.814 SPONDYLOSIS OF THORACIC REGION WITHOUT MYELOPATHY OR RADICULOPATHY: ICD-10-CM

## 2020-01-14 DIAGNOSIS — G89.29 CHRONIC BILATERAL LOW BACK PAIN WITHOUT SCIATICA: ICD-10-CM

## 2020-01-14 DIAGNOSIS — M51.36 DEGENERATION OF LUMBAR INTERVERTEBRAL DISC: Primary | ICD-10-CM

## 2020-01-14 PROCEDURE — 99214 PR OFFICE/OUTPT VISIT, EST, LEVL IV, 30-39 MIN: ICD-10-PCS | Mod: S$GLB,,, | Performed by: PREVENTIVE MEDICINE

## 2020-01-14 PROCEDURE — 99214 OFFICE O/P EST MOD 30 MIN: CPT | Mod: S$GLB,,, | Performed by: PREVENTIVE MEDICINE

## 2020-01-14 RX ORDER — HYDROCODONE BITARTRATE AND ACETAMINOPHEN 10; 325 MG/1; MG/1
1 TABLET ORAL EVERY 6 HOURS PRN
Qty: 90 TABLET | Refills: 0 | Status: SHIPPED | OUTPATIENT
Start: 2020-01-14 | End: 2020-02-11 | Stop reason: SDUPTHER

## 2020-01-14 RX ORDER — LIDOCAINE 50 MG/G
1 PATCH TOPICAL ONCE
Qty: 30 PATCH | Refills: 0 | Status: SHIPPED | OUTPATIENT
Start: 2020-01-14 | End: 2020-01-14 | Stop reason: SDUPTHER

## 2020-01-14 RX ORDER — LIDOCAINE 50 MG/G
1 PATCH TOPICAL ONCE
Qty: 30 PATCH | Refills: 0 | Status: SHIPPED | OUTPATIENT
Start: 2020-01-14 | End: 2020-03-10 | Stop reason: ALTCHOICE

## 2020-01-14 NOTE — PROGRESS NOTES
Subjective:       Patient ID: Sasha Michelle is a 69 y.o. female.    Chief Complaint: Back Pain    W/C f/u- Lower and Upper Back Pain. Patient is a follow up for back pain from 3/5/01. She states her back is feel slightly better with a pain level 4/10 on today. However, patient has pain in her upper and lower back and describes it as a burning and throbbing pain. [place]    Back Pain   This is a recurrent problem. The current episode started more than 1 year ago. The problem occurs intermittently. The problem is unchanged. The pain is present in the lumbar spine. The quality of the pain is described as burning (throbbing). The pain does not radiate. The pain is at a severity of 4/10. The pain is moderate. The pain is the same all the time. The symptoms are aggravated by sitting. Pertinent negatives include no abdominal pain, bladder incontinence, bowel incontinence, chest pain, dysuria, fever, headaches, leg pain, numbness, paresis, paresthesias, pelvic pain, perianal numbness, tingling, weakness or weight loss. She has tried muscle relaxant, NSAIDs and home exercises for the symptoms. The treatment provided mild relief.       Constitution: Negative for fever.   Cardiovascular: Negative for chest pain.   Gastrointestinal: Negative for abdominal pain and bowel incontinence.   Genitourinary: Negative for dysuria, bladder incontinence and pelvic pain.   Musculoskeletal: Positive for back pain.   Neurological: Negative for headaches and numbness.        Objective:      Physical Exam   Constitutional: She appears well-developed and well-nourished.   HENT:   Head: Normocephalic and atraumatic.   Eyes: Pupils are equal, round, and reactive to light. EOM are normal.   Neck: Normal range of motion. Neck supple.   Cardiovascular: Normal rate.   Pulmonary/Chest: Effort normal.   Musculoskeletal:        Cervical back: She exhibits decreased range of motion, tenderness and pain. She exhibits no bony tenderness, no swelling, no  edema, no deformity, no laceration, no spasm and normal pulse.        Lumbar back: She exhibits decreased range of motion, tenderness, pain and spasm. She exhibits no bony tenderness, no swelling, no edema, no deformity, no laceration and normal pulse.        Back:    Pain persists and is unchanged with palpation of mid and  low back without spasm. Pain  with flexion to 90, extension to 25 and lateral bending to 25 degrees. No motor or sensory deficits.   Patient also has pain about thoracic spine with palpation and range of motion testing. Pain with flexion of neck to 25 degrees, extension to 10 degrees and later bending to 25 degrees.   Neurological: She is alert.   No focal neurologic deficits   Skin: Skin is warm.   Psychiatric: She has a normal mood and affect. Her behavior is normal. Judgment and thought content normal.   Nursing note and vitals reviewed.      Assessment:       1. Degeneration of lumbar intervertebral disc    2. Sprain of ligaments of lumbar spine, subsequent encounter    3. Chronic bilateral low back pain without sciatica    4. Chronic cervical pain    5. Spondylosis of thoracic region without myelopathy or radiculopathy        Plan:     patient will continue using ibuprofen 800 mg t.i.d. with food as needed for back pain. She also takes Flexeril 10 mg at night as needed.    Medications Ordered This Encounter   Medications    HYDROcodone-acetaminophen (NORCO)  mg per tablet     Sig: Take 1 tablet by mouth every 6 (six) hours as needed for Pain.     Dispense:  90 tablet     Refill:  0     Quantity prescribed more than 7 day supply? Yes, quantity medically necessary    lidocaine (LIDODERM) 5 %     Sig: Place 1 patch onto the skin once. for 1 dose     Dispense:  30 patch     Refill:  0     Patient Instructions: Daily home exercises/warm soaks   Restrictions: Disabled until next office visit  Follow up in about 4 weeks (around 2/11/2020).

## 2020-01-14 NOTE — LETTER
[facility] - [address]  [place] [address]  Phone: [phone]  Fax: [phone]  [facility]: [id number]    Pt Name: Sasha Michelle  Injury Date: 03/15/2001   Employee ID: [id number] Date of Treatment: 01/14/2020   Company: [employer]      Appointment Time: 01:45 PM Arrived: 9:55 AM   Provider: Miguel Staples MD Time Out: 11:25 AM     Office Treatment:   EXAM  RX X2 GIVEN  DISABLED UNTIL NEXT OFFICE VISIT     1. Degeneration of lumbar intervertebral disc    2. Sprain of ligaments of lumbar spine, subsequent encounter    3. Chronic bilateral low back pain without sciatica    4. Chronic cervical pain    5. Spondylosis of thoracic region without myelopathy or radiculopathy      Medications Ordered This Encounter   Medications    HYDROcodone-acetaminophen (NORCO)  mg per tablet    lidocaine (LIDODERM) 5 %      Patient Instructions: Daily home exercises/warm soaks    Restrictions: Disabled until next office visit     Return Appointment: 02/11/2020 at 9:00 AM  [place]

## 2020-02-11 ENCOUNTER — OFFICE VISIT (OUTPATIENT)
Dept: URGENT CARE | Facility: CLINIC | Age: 70
End: 2020-02-11
Payer: OTHER GOVERNMENT

## 2020-02-11 DIAGNOSIS — G89.29 CHRONIC CERVICAL PAIN: ICD-10-CM

## 2020-02-11 DIAGNOSIS — G89.29 CHRONIC BILATERAL LOW BACK PAIN WITHOUT SCIATICA: ICD-10-CM

## 2020-02-11 DIAGNOSIS — S33.5XXD SPRAIN OF LIGAMENTS OF LUMBAR SPINE, SUBSEQUENT ENCOUNTER: ICD-10-CM

## 2020-02-11 DIAGNOSIS — M47.814 SPONDYLOSIS OF THORACIC REGION WITHOUT MYELOPATHY OR RADICULOPATHY: ICD-10-CM

## 2020-02-11 DIAGNOSIS — M54.50 CHRONIC BILATERAL LOW BACK PAIN WITHOUT SCIATICA: ICD-10-CM

## 2020-02-11 DIAGNOSIS — M51.36 DEGENERATION OF LUMBAR INTERVERTEBRAL DISC: Primary | ICD-10-CM

## 2020-02-11 DIAGNOSIS — M54.2 CHRONIC CERVICAL PAIN: ICD-10-CM

## 2020-02-11 PROCEDURE — 99214 PR OFFICE/OUTPT VISIT, EST, LEVL IV, 30-39 MIN: ICD-10-PCS | Mod: S$GLB,,, | Performed by: PREVENTIVE MEDICINE

## 2020-02-11 PROCEDURE — 99214 OFFICE O/P EST MOD 30 MIN: CPT | Mod: S$GLB,,, | Performed by: PREVENTIVE MEDICINE

## 2020-02-11 RX ORDER — HYDROCODONE BITARTRATE AND ACETAMINOPHEN 10; 325 MG/1; MG/1
1 TABLET ORAL EVERY 6 HOURS PRN
Qty: 90 TABLET | Refills: 0 | Status: SHIPPED | OUTPATIENT
Start: 2020-02-11 | End: 2020-03-10 | Stop reason: SDUPTHER

## 2020-02-11 NOTE — LETTER
[facility] - [address] [address]  Phone: [phone]  Fax: [phone]  [facility]: [id number]    Pt Name: Sasha Michelle  Injury Date: 03/15/2001   Employee ID: [id number] Date of Treatment: 02/11/2020   Company: [employer]      Appointment Time: 08:45 AM Arrived:8:55 a.m.   Provider: Miguel Staples MD Time Out: 10:14 a.m.     Office Treatment:   EXAM  Prescription  Disabled until next office visit    1. Degeneration of lumbar intervertebral disc    2. Sprain of ligaments of lumbar spine, subsequent encounter    3. Chronic bilateral low back pain without sciatica    4. Chronic cervical pain    5. Spondylosis of thoracic region without myelopathy or radiculopathy      Medications Ordered This Encounter   Medications    HYDROcodone-acetaminophen (NORCO)  mg per tablet      Patient Instructions: Daily home exercises/warm soaks    Restrictions: Disabled until next office visit     Return Appointment: 3/10/2020 at 9:30 a.m.  [place]

## 2020-02-11 NOTE — PROGRESS NOTES
Subjective:       Patient ID: Sasha Michelle is a 69 y.o. female.    Chief Complaint: Back Pain (lower)    W/C f/u- Lower back- Patient states she still feels a burning and throbbing pain in her lower back. Pain level 6/10 on today. When she sits it hurts the most and she is currently taking Vicodin and muscle relaxer and Lidocaine patches at night. [place]    Back Pain   This is a recurrent problem. The current episode started more than 1 month ago. The problem occurs constantly. The problem is unchanged. The pain is present in the lumbar spine. The quality of the pain is described as burning (throbbing). The pain does not radiate. The pain is at a severity of 6/10. The pain is moderate. The pain is worse during the day. The symptoms are aggravated by sitting. Pertinent negatives include no abdominal pain, bladder incontinence, bowel incontinence, chest pain, dysuria, fever, headaches, leg pain, numbness, paresis, paresthesias, pelvic pain, perianal numbness, tingling, weakness or weight loss. She has tried muscle relaxant and analgesics for the symptoms. The treatment provided mild relief.       Constitution: Negative for fatigue and fever.   HENT: Negative.    Neck: negative.   Cardiovascular: Negative.  Negative for chest pain.   Eyes: Negative.    Respiratory: Negative.    Gastrointestinal: Negative for abdominal pain and bowel incontinence.   Endocrine: negative.   Genitourinary: Negative for dysuria, urgency, bladder incontinence, hematuria and pelvic pain.   Musculoskeletal: Positive for pain, back pain and muscle cramps. Negative for history of spine disorder.   Skin: Negative for rash.   Neurological: Negative for coordination disturbances, headaches, numbness and tingling.   Hematologic/Lymphatic: Negative.    Psychiatric/Behavioral: Negative.         Objective:      Physical Exam   Constitutional: She appears well-developed and well-nourished.   HENT:   Head: Normocephalic and atraumatic.   Eyes: Pupils are  equal, round, and reactive to light. EOM are normal.   Neck: Normal range of motion. Neck supple.   Cardiovascular: Normal rate.   Pulmonary/Chest: Effort normal.   Musculoskeletal:        Cervical back: She exhibits decreased range of motion, tenderness and pain. She exhibits no bony tenderness, no swelling, no edema, no deformity, no laceration, no spasm and normal pulse.        Lumbar back: She exhibits decreased range of motion, tenderness, pain and spasm. She exhibits no bony tenderness, no swelling, no edema, no deformity, no laceration and normal pulse.        Back:    Pain persists and is unchanged with palpation of mid and  low back without spasm. Pain  with flexion to 90, extension to 25 and lateral bending to 25 degrees. No motor or sensory deficits.   Patient also has pain about thoracic spine with palpation and range of motion testing. Pain with flexion of neck to 25 degrees, extension to 10 degrees and later bending to 25 degrees.   Neurological: She is alert.   No focal neurologic deficits   Skin: Skin is warm.   Psychiatric: She has a normal mood and affect. Her behavior is normal. Judgment and thought content normal.   Nursing note and vitals reviewed.      Assessment:       1. Degeneration of lumbar intervertebral disc    2. Sprain of ligaments of lumbar spine, subsequent encounter    3. Chronic bilateral low back pain without sciatica    4. Chronic cervical pain    5. Spondylosis of thoracic region without myelopathy or radiculopathy        Plan:     patient will be taking ibuprofen 800 mg t.i.d. with food as needed for back pain during the day and Flexeril 10 mg at night as needed.  She will also apply lidocaine patches as needed.    Medications Ordered This Encounter   Medications    HYDROcodone-acetaminophen (NORCO)  mg per tablet     Sig: Take 1 tablet by mouth every 6 (six) hours as needed for Pain.     Dispense:  90 tablet     Refill:  0     Quantity prescribed more than 7 day supply?  Yes, quantity medically necessary     Patient Instructions: Daily home exercises/warm soaks   Restrictions: Disabled until next office visit  Follow up in about 4 weeks (around 3/10/2020).

## 2020-03-10 ENCOUNTER — OFFICE VISIT (OUTPATIENT)
Dept: URGENT CARE | Facility: CLINIC | Age: 70
End: 2020-03-10
Payer: MEDICARE

## 2020-03-10 ENCOUNTER — OFFICE VISIT (OUTPATIENT)
Dept: URGENT CARE | Facility: CLINIC | Age: 70
End: 2020-03-10
Payer: OTHER GOVERNMENT

## 2020-03-10 VITALS
HEIGHT: 62 IN | TEMPERATURE: 98 F | DIASTOLIC BLOOD PRESSURE: 85 MMHG | HEART RATE: 67 BPM | OXYGEN SATURATION: 98 % | WEIGHT: 148 LBS | SYSTOLIC BLOOD PRESSURE: 145 MMHG | RESPIRATION RATE: 17 BRPM | BODY MASS INDEX: 27.23 KG/M2

## 2020-03-10 DIAGNOSIS — M51.36 DEGENERATION OF LUMBAR INTERVERTEBRAL DISC: Primary | ICD-10-CM

## 2020-03-10 DIAGNOSIS — R03.0 ELEVATED BLOOD PRESSURE READING: ICD-10-CM

## 2020-03-10 DIAGNOSIS — J02.9 SORE THROAT: ICD-10-CM

## 2020-03-10 DIAGNOSIS — M54.2 CHRONIC CERVICAL PAIN: ICD-10-CM

## 2020-03-10 DIAGNOSIS — G89.29 CHRONIC CERVICAL PAIN: ICD-10-CM

## 2020-03-10 DIAGNOSIS — M47.814 SPONDYLOSIS OF THORACIC REGION WITHOUT MYELOPATHY OR RADICULOPATHY: ICD-10-CM

## 2020-03-10 DIAGNOSIS — R53.83 LETHARGY: ICD-10-CM

## 2020-03-10 DIAGNOSIS — S33.5XXD SPRAIN OF LIGAMENTS OF LUMBAR SPINE, SUBSEQUENT ENCOUNTER: ICD-10-CM

## 2020-03-10 DIAGNOSIS — R49.0 HOARSE VOICE QUALITY: ICD-10-CM

## 2020-03-10 DIAGNOSIS — G89.29 CHRONIC BILATERAL LOW BACK PAIN WITHOUT SCIATICA: ICD-10-CM

## 2020-03-10 DIAGNOSIS — R05.9 COUGH: ICD-10-CM

## 2020-03-10 DIAGNOSIS — R09.89 CHEST CONGESTION: ICD-10-CM

## 2020-03-10 DIAGNOSIS — B34.9 VIRAL SYNDROME: Primary | ICD-10-CM

## 2020-03-10 DIAGNOSIS — M54.50 CHRONIC BILATERAL LOW BACK PAIN WITHOUT SCIATICA: ICD-10-CM

## 2020-03-10 PROCEDURE — 99213 OFFICE O/P EST LOW 20 MIN: CPT | Mod: S$GLB,,, | Performed by: PREVENTIVE MEDICINE

## 2020-03-10 PROCEDURE — 99214 OFFICE O/P EST MOD 30 MIN: CPT | Mod: S$GLB,,, | Performed by: NURSE PRACTITIONER

## 2020-03-10 PROCEDURE — 99213 PR OFFICE/OUTPT VISIT, EST, LEVL III, 20-29 MIN: ICD-10-PCS | Mod: S$GLB,,, | Performed by: PREVENTIVE MEDICINE

## 2020-03-10 PROCEDURE — 99214 PR OFFICE/OUTPT VISIT, EST, LEVL IV, 30-39 MIN: ICD-10-PCS | Mod: S$GLB,,, | Performed by: NURSE PRACTITIONER

## 2020-03-10 RX ORDER — IBUPROFEN 800 MG/1
800 TABLET ORAL 3 TIMES DAILY
Qty: 60 TABLET | Refills: 1 | Status: SHIPPED | OUTPATIENT
Start: 2020-03-10 | End: 2020-07-16 | Stop reason: SDUPTHER

## 2020-03-10 RX ORDER — LIDOCAINE 50 MG/G
1 PATCH TOPICAL DAILY
Qty: 30 PATCH | Refills: 2 | Status: SHIPPED | OUTPATIENT
Start: 2020-03-10 | End: 2020-04-14 | Stop reason: SDUPTHER

## 2020-03-10 RX ORDER — ALBUTEROL SULFATE 90 UG/1
2 AEROSOL, METERED RESPIRATORY (INHALATION) EVERY 6 HOURS PRN
Qty: 1 G | Refills: 0 | Status: SHIPPED | OUTPATIENT
Start: 2020-03-10 | End: 2021-02-11

## 2020-03-10 RX ORDER — AZELASTINE 1 MG/ML
1 SPRAY, METERED NASAL 2 TIMES DAILY
Qty: 30 ML | Refills: 0 | Status: SHIPPED | OUTPATIENT
Start: 2020-03-10 | End: 2021-02-11

## 2020-03-10 RX ORDER — TIZANIDINE 4 MG/1
4 TABLET ORAL EVERY 6 HOURS
COMMUNITY
Start: 2020-01-07 | End: 2020-03-10 | Stop reason: ALTCHOICE

## 2020-03-10 RX ORDER — HYDROCODONE BITARTRATE AND ACETAMINOPHEN 10; 325 MG/1; MG/1
1 TABLET ORAL EVERY 6 HOURS PRN
Qty: 90 TABLET | Refills: 0 | Status: SHIPPED | OUTPATIENT
Start: 2020-03-10 | End: 2020-04-14 | Stop reason: SDUPTHER

## 2020-03-10 RX ORDER — PROMETHAZINE HYDROCHLORIDE AND DEXTROMETHORPHAN HYDROBROMIDE 6.25; 15 MG/5ML; MG/5ML
2.5 SYRUP ORAL EVERY 6 HOURS PRN
Qty: 100 ML | Refills: 0 | Status: SHIPPED | OUTPATIENT
Start: 2020-03-10 | End: 2020-03-17

## 2020-03-10 RX ORDER — CYCLOBENZAPRINE HCL 10 MG
10 TABLET ORAL NIGHTLY
Qty: 30 TABLET | Refills: 0 | Status: SHIPPED | OUTPATIENT
Start: 2020-03-10 | End: 2020-03-20

## 2020-03-10 RX ORDER — BENZONATATE 100 MG/1
100 CAPSULE ORAL 3 TIMES DAILY PRN
Qty: 21 CAPSULE | Refills: 0 | Status: SHIPPED | OUTPATIENT
Start: 2020-03-10 | End: 2020-03-17

## 2020-03-10 NOTE — PROGRESS NOTES
"Subjective:       Patient ID: Sasha Michelle is a 69 y.o. female.    Vitals:  height is 5' 2" (1.575 m) and weight is 67.1 kg (148 lb). Her oral temperature is 98.3 °F (36.8 °C). Her blood pressure is 145/85 (abnormal) and her pulse is 67. Her respiration is 17 and oxygen saturation is 98%.     Chief Complaint: Cough    Patient presents with a cough, green sputum and congestion. She says she has been having the cough for the last week ,but denies fever .     Cough   This is a new problem. The current episode started in the past 7 days. The problem has been unchanged. The problem occurs every few minutes. The cough is productive of sputum. Associated symptoms include nasal congestion and postnasal drip. Pertinent negatives include no chest pain, chills, fever, headaches, myalgias, rash, sore throat or shortness of breath. Treatments tried: nyquil, hot tea and honey  The treatment provided mild relief. There is no history of asthma, bronchiectasis or bronchitis.       Constitution: Negative for chills, fatigue and fever.   HENT: Positive for congestion and postnasal drip. Negative for sore throat.    Neck: Negative for painful lymph nodes.   Cardiovascular: Negative for chest pain and leg swelling.   Eyes: Negative for double vision and blurred vision.   Respiratory: Positive for cough and sputum production. Negative for shortness of breath.    Gastrointestinal: Negative for nausea, vomiting and diarrhea.   Genitourinary: Negative for dysuria, frequency, urgency and history of kidney stones.   Musculoskeletal: Negative for joint pain, joint swelling, muscle cramps and muscle ache.   Skin: Negative for color change, pale, rash and bruising.   Allergic/Immunologic: Negative for seasonal allergies.   Neurological: Negative for dizziness, history of vertigo, light-headedness, passing out and headaches.   Hematologic/Lymphatic: Negative for swollen lymph nodes.   Psychiatric/Behavioral: Negative for nervous/anxious, sleep " disturbance and depression. The patient is not nervous/anxious.        Objective:      Physical Exam   Constitutional: She is oriented to person, place, and time. She appears well-developed and well-nourished. She is cooperative.  Non-toxic appearance. She does not have a sickly appearance. She does not appear ill. No distress.   HENT:   Head: Normocephalic and atraumatic.   Right Ear: Hearing, external ear and ear canal normal. A middle ear effusion is present.   Left Ear: Hearing, external ear and ear canal normal. A middle ear effusion is present.   Nose: Mucosal edema present. No rhinorrhea or nasal deformity. No epistaxis. Right sinus exhibits maxillary sinus tenderness. Right sinus exhibits no frontal sinus tenderness. Left sinus exhibits maxillary sinus tenderness. Left sinus exhibits no frontal sinus tenderness.   Mouth/Throat: Uvula is midline and mucous membranes are normal. No trismus in the jaw. Normal dentition. No uvula swelling. Posterior oropharyngeal erythema present. No oropharyngeal exudate or posterior oropharyngeal edema.   Eyes: Conjunctivae and lids are normal. No scleral icterus.   Neck: Trachea normal, full passive range of motion without pain and phonation normal. Neck supple. No neck rigidity. No edema and no erythema present.   Cardiovascular: Normal rate, regular rhythm, normal heart sounds, intact distal pulses and normal pulses.   Pulmonary/Chest: Effort normal and breath sounds normal. No respiratory distress. She has no decreased breath sounds. She has no rhonchi.   Abdominal: Normal appearance.   Musculoskeletal: Normal range of motion. She exhibits no edema or deformity.   Lymphadenopathy:     She has no cervical adenopathy.   Neurological: She is alert and oriented to person, place, and time. She exhibits normal muscle tone. Coordination normal.   Skin: Skin is warm, dry, intact, not diaphoretic and not pale.   Psychiatric: She has a normal mood and affect. Her speech is normal  and behavior is normal. Judgment and thought content normal. Cognition and memory are normal.   Nursing note and vitals reviewed.        Assessment:       1. Viral syndrome    2. Elevated blood pressure reading    3. Cough    4. Sore throat    5. Chest congestion    6. Lethargy    7. Hoarse voice quality        Plan:         Viral syndrome  -     azelastine (ASTELIN) 137 mcg (0.1 %) nasal spray; 1 spray (137 mcg total) by Nasal route 2 (two) times daily. for 14 days  Dispense: 30 mL; Refill: 0    Elevated blood pressure reading    Cough  -     benzonatate (TESSALON) 100 MG capsule; Take 1 capsule (100 mg total) by mouth 3 (three) times daily as needed for Cough.  Dispense: 21 capsule; Refill: 0  -     promethazine-dextromethorphan (PROMETHAZINE-DM) 6.25-15 mg/5 mL Syrp; Take 2.5 mLs by mouth every 6 (six) hours as needed (May take every 4 hours, PRN. DoNOT take more than 30 mL in 24 hours.).  Dispense: 100 mL; Refill: 0    Sore throat    Chest congestion  -     albuterol (PROVENTIL HFA) 90 mcg/actuation inhaler; Inhale 2 puffs into the lungs every 6 (six) hours as needed for Wheezing. Rescue  Dispense: 1 g; Refill: 0    Lethargy    Hoarse voice quality         Patient Instructions     Always follow your healthcare professional's instructions.    You have received urgent care diagnosis and treatment and you may be released before all of your medical problems are known or treated. Unless you have been given a referral, you (the patient), will arrange for follow-up care as instructed.     If you have been given a referral, [facility] will contact you (their phone number is [phone]). If they do NOT call you by the end of the business day, please call them the following day.    You have been diagnosed with Viral Syndrome. TREATMENT: YOUR TREATMENT IS AIMED AT SYMPTOM MANAGEMENT.       A viral illness may cause a number of symptoms. The symptoms depend on the part of the body that the virus affects. If it settles in  "your nose, throat, and lungs, it may cause cough, sore throat, congestion, and sometimes headache. If it settles in your stomach and intestinal tract, it may cause vomiting and diarrhea. Sometimes it causes vague symptoms like "aching all over," feeling tired, loss of appetite, or fever. A viral illness usually lasts 1 to 2 weeks, but sometimes it lasts longer. In some cases, a more serious infection can look like a viral syndrome in the first few days of the illness; therefore, you may need another exam and additional tests to know the difference.          Home care  Follow these guidelines for taking care of yourself at home:  · If symptoms are severe, rest at home for the first 2 to 3 days.  · Stay away from cigarette smoke - both your smoke and the smoke from others.  · You may use over-the-counter acetaminophen or ibuprofen for fever, muscle aching, and headache, unless another medicine was prescribed for this. If you have chronic liver or kidney disease or ever had a stomach ulcer or GI bleeding, talk with your doctor before using these medicines. No one who is younger than 18 and ill with a fever should take aspirin. It may cause severe disease or death.  · Your appetite may be poor, so a light diet is fine. Avoid dehydration by drinking 8 to 12 8-ounce glasses of fluids each day. This may include water; orange juice; lemonade; apple, grape, and cranberry juice; clear fruit drinks; electrolyte replacement and sports drinks; and decaffeinated teas and coffee. If you have been diagnosed with a kidney disease, ask your doctor how much and what types of fluids you should drink to prevent dehydration. If you have kidney disease, drinking too much fluid can cause it build up in the your body and be dangerous to your health.  · Over-the-counter remedies won't shorten the length of the illness but may be helpful for cough, sore throat; and nasal and sinus congestion. Don't use decongestants if you have high blood " "pressure.    Follow-up care  Follow up with your healthcare provider if your symptoms continue for more than 7 days.    Fever Cough Body Aches Nausea and Vomiting Diarrhea Congestion or Runny Nose    OTC Tylenol   OTC NSAIDs  Tessalon Pearls   Phenergan DM   OTC cough medications  Mobic   OTC Tylenol   OTC NSAIDs  Zofran   Phenergan   OTC Emetrol  DO NOT take ant-diarrheal medications  OTC Claritin, Zyrtec, Allegra, OR Xyzal   OTC Flonase   OTC Benadryl      Cough Allergy Symptoms Asthma Headache or Body Aches   Tessalon Perles are a non-narcotic cough medicine. It works by numbing the throat and lungs, making the cough reflex less active, it is used to relieve coughing during the day. If you have been given Phenergan DM cough syrup, you do NOT need to take both medications at the same time.    Phenergan DM is a combination medication that is used to treat cough. Phenergan DM works like an antihistamine and cough suppressant. This medication will make you sleepy like Benadryl, have a drying effect, and act on a part of the brain (cough center) to reduce the need to cough. DO NOT take Benadryl and Phenergan together. You may take over-the-counter claritin, zyrtec, allegra, or xyzal as directed, these are antihistamines that will work to dry up secretions/mucus. Antihistamines work to block the effects of a certain natural substance (histamine), which causes allergy symptoms.    OR    Use over-the-counter Flonase (a nasal steroid) or prescribed Azelastine (a nasal antihistamine) to treat runny nose, sneezing, itchy nose, nasal congestion, and postnasal drip.    Proper administration is to "look down at your toes and aim for your nose". The goal is to aim for your nasolabial folds, the creases in your nose. If you feel the medication drip down your throat, you have NOT administered it correctly. If you can "smell the roses" (floral scent), then you have administered it correctly. If you have a history of " asthma, expressed a concern about wheezing or have been told you were wheezing during your exam today, you are being given Albuterol. Albuterol is a bronchodilator that relaxes muscles in the airways and increases air flow to the lungs; it is used to treat or prevent bronchospasm, or narrowing of the airways in the lungs.     If you have a history of asthma, expressed a concern about wheezing or have been told you were wheezing during your exam today and are NOT being prescribed Albuterol that is because you have insured me that you have an adequate supply of the drug at home.    Mobic is a nonsteroidal anti-inflammatory drug (NSAID), it is used to treat inflammation. It reduces pain, swelling, and stiffness of the joints. Please do NOT take any other NSAID medications while on this medication, you can take Tylenol if you feel the need. If you were not prescribed an anti-inflammatory medication, and if you do not have any history of stomach/intestinal ulcers, or kidney disease, or are not taking a blood thinner such as Coumadin, Plavix, Pradaxa, Eloquis, or Xaralta for example, it is OK to take over the counter Ibuprofen or Advil or Motrin or Aleve as directed.  Do not take any of these medications on an empty stomach.         Dehydration (Adult)  Dehydration occurs when your body loses too much fluid. This may be the result of prolonged vomiting or diarrhea, excessive sweating, or a high fever. It may also happen if you don't drink enough fluid when you're sick or out in the heat. Misuse of diuretics (water pills) can also be a cause.  Symptoms include thirst and decreased urine output. You may also feel dizzy, weak, fatigued, or very drowsy. The diet described below is usually enough to treat dehydration. In some cases, you may need medicine.    Home care  · Drink at least 12 8-ounce glasses of fluid every day to resolve the dehydration. Fluid may include water; orange juice; lemonade; apple, grape, or cranberry  juice; clear fruit drinks; electrolyte replacement and sports drinks; and teas and coffee without caffeine. If you have been diagnosed with a kidney disease, ask your doctor how much and what types of fluids you should drink to prevent dehydration. If you have kidney disease, fluid can build up in the body. This can be dangerous to your health.  · If you have a fever, muscle aches, or a headache as a result of a cold or flu, you may take acetaminophen or ibuprofen, unless another medicine was prescribed. If you have chronic liver or kidney disease, or have ever had a stomach ulcer or gastrointestinal bleeding, talk with your health care provider before using these medicines. Don't take aspirin if you are younger than 18 and have a fever. Aspirin raises the chance for severe liver injury.    When to seek medical advice  DEHYDRATION:  · Continued vomiting  · Frequent diarrhea (more than 5 times a day); blood (red or black color) or mucus in diarrhea  · Blood in vomit or stool  · Swollen abdomen or increasing abdominal pain  · Weakness, dizziness, or fainting  · Unusual drowsiness or confusion  · Reduced urine output or extreme thirst    FEVER:  Call your healthcare provider right away if any of these occur:  · Your child is 3 months old or younger and has a fever of 100.4°F (38°C) or higher. Get medical care right away. Fever in a young baby can be a sign of a dangerous infection.  · Your child is of any age and has repeated fevers above 104°F (40°C).  · Your child is younger than 2 years of age and a fever of 100.4°F (38°C) continues for more than 1 day.  · A fever of 100.4°F (38°C) for more than 3 days in anyone older than 2 years of age.       Why didn't I get a steroid today?    The benefits are small and, unlike topical glucocorticoids, systemic glucocorticoids possess a significant side effect profile. Major side effects include:    · Effects immunity predisposing you to getting a more severe infection and  "increases your white blood cell count. You have the flu, you do not need anything to weaken your immune system right now.  · Young children -- Growth impairment   · Skin consequences: Skin thinning and ecchymoses, Cushingoid appearance (rounded face), acne, weight gain, mild hirsutism, facial erythema, and striae.  · Eye consequences: Cataracts, increased intraocular pressure, exophthalmos.   · Cardiovascular consequences: Fluid retention, premature atherosclerotic disease, and arrhythmias.   · GI consequences: Increased risk for adverse gastrointestinal effects, such as gastritis, ulcer formation, and gastrointestinal bleeding  · Bone and muscle consequences: Osteoporosis, osteonecrosis, and myopathy.  · Neuropsychiatric effects: Mood disorders, psychosis, memory impairment, and   · Metabolic and Endocrine consequences: Suppress the hypothalamic-pituitary-adrenal (HPA) axis and increase blood sugar.     Can I just have a shot instead of pills?  No. If you are sick, you need a course of treatment (not just a one time dose).     Why didn't I get an antibiotic today?  You have been diagnosed with a virus an antibiotic will not help you. Antibiotics work by destroying the cell walls of bacteria, viruses do not have cell walls.     When to seek medical advice:    DEHYDRATION:  · Continued vomiting  · Frequent diarrhea (more than 5 times a day); blood (red or black color) or mucus in diarrhea  · Blood in vomit or stool  · Swollen abdomen or increasing abdominal pain  · Reduced urine output or extreme thirst  · Repeated vomiting after the first 4 hours on fluids  · Occasional vomiting for more than 48 hours  · Frequent diarrhea (more than 5 times a day), blood in diarrhea (red or black color), or mucus in diarrhea  · Blood in vomit or stool  · Swollen abdomen or signs of abdominal pain  · No urine for 8 hours, no tears when crying, "sunken" eyes, or dry mouth  · Unusual behavior changes, fussiness, drowsiness, confusion, " or seizure  · Fever (see Fever and children, below)  FEVER:  Call your healthcare provider right away if any of these occur:  · Your child is 3 months old or younger and has a fever of 100.4°F (38°C) or higher. Get medical care right away. Fever in a young baby can be a sign of a dangerous infection.  · Your child is of any age and has repeated fevers above 104°F (40°C).  · Your child is younger than 2 years of age and a fever of 100.4°F (38°C) continues for more than 1 day.  · A fever of 100.4°F (38°C) for more than 3 days in anyone older than 2 years of age.  Call 911  Call 911 or your local emergency services number if the child shows any of these symptoms or signs:  · Trouble breathing  · Confusion  · Is very drowsy or difficult to awaken  · Fainting or loss of consciousness  · Rapid heart rate  · Seizure  · Stiff neck     Elevated Blood Pressure    Your blood pressure was elevated during your visit to the urgent care.     It was not so high that immediate care was needed, but it is recommended that you monitor your blood pressure over the next week or two to make sure that it is not staying elevated.      Please keep a log/diary of your blood pressure readings and notify your primary care physician. Keep all that information and take it with you to see your primary care physician.     If you are taking antihypertensives, please continue your medication as prescribed.      If your blood pressure is consistently above 140/90 you will need to follow-up with your primary care physician.   more quickly.     According to ACEP guidelines, workup and treatment of hypertension in asymptomatic patient is not warranted in the ED: (1) Initiating treatment for asymptomatic hypertension in the ED is not necessary when patients have follow-up; (2) Rapidly lowering blood pressure in asymptomatic patients in the ED is unnecessary and may be harmful in some patients; (3) When ED treatment for asymptomatic hypertension is  initiated, blood pressure management should attempt to gradually lower blood pressure and should not be expected to be normalized during the initial ED visit.        Call your doctor immediately or seek emergency care if you have any of the following:  · Chest pain or shortness of breath (call 911)  · Moderate to severe headache  · Weakness in the muscles of your face, arms, or legs  · Trouble speaking  · Extreme drowsiness  · Confusion  · Fainting or dizziness  · Pulsating or rushing sound in your ears  · Unexplained nosebleed  · Weakness, tingling, or numbness of your face, arms, or legs  · Change in vision  · Blood pressure measured at home that is greater than 180/110     Changes You Can Make to Manage High Blood Pressure:    Healthy Diet  Shaking the Salt Habit  Potassium: Foods that are rich in potassium are important in managing high blood pressure (HBP or hypertension) because potassium lessens the effects of sodium.  Alcohol: Drinking too much alcohol can raise your blood pressure.   Getting Active  Managing Stress  Managing Weight  NOT Smoking  Medications: When blood pressure is high, high blood pressure medication may be an important part of your treatment.  OTC Medications: See below for some medications to avoid.  Partnering With Your Doctor: According to American Heart Association recommendations, people being treated but still not reaching their target blood pressure -- should be a healthcare priority. A key to better control is a good doctor-patient partnership.    People with high blood pressure should be aware that the use of decongestants may raise blood pressure or interfere with the effectiveness of some prescribed blood pressure medications. Be aware of over-the-counter cold and flu preparations that contain decongestants such as:    Oxymetazoline  Phenylephrine  Pseudoephedrine      Your provider discussed your plan of care with you during your physical exam. It was reviewed once more by the  provider when giving you an after visit summary. If the patient is a minor, the discharge instructions were discussed with an adult and that adult acknowledge their understanding of the provider's teaching.

## 2020-03-10 NOTE — PATIENT INSTRUCTIONS
"Always follow your healthcare professional's instructions.    You have received urgent care diagnosis and treatment and you may be released before all of your medical problems are known or treated. Unless you have been given a referral, you (the patient), will arrange for follow-up care as instructed.     If you have been given a referral, [facility] will contact you (their phone number is [phone]). If they do NOT call you by the end of the business day, please call them the following day.    You have been diagnosed with Viral Syndrome. TREATMENT: YOUR TREATMENT IS AIMED AT SYMPTOM MANAGEMENT.       A viral illness may cause a number of symptoms. The symptoms depend on the part of the body that the virus affects. If it settles in your nose, throat, and lungs, it may cause cough, sore throat, congestion, and sometimes headache. If it settles in your stomach and intestinal tract, it may cause vomiting and diarrhea. Sometimes it causes vague symptoms like "aching all over," feeling tired, loss of appetite, or fever. A viral illness usually lasts 1 to 2 weeks, but sometimes it lasts longer. In some cases, a more serious infection can look like a viral syndrome in the first few days of the illness; therefore, you may need another exam and additional tests to know the difference.          Home care  Follow these guidelines for taking care of yourself at home:  · If symptoms are severe, rest at home for the first 2 to 3 days.  · Stay away from cigarette smoke - both your smoke and the smoke from others.  · You may use over-the-counter acetaminophen or ibuprofen for fever, muscle aching, and headache, unless another medicine was prescribed for this. If you have chronic liver or kidney disease or ever had a stomach ulcer or GI bleeding, talk with your doctor before using these medicines. No one who is younger than 18 and ill with a fever should take aspirin. It may cause severe disease or death.  · Your appetite may be " poor, so a light diet is fine. Avoid dehydration by drinking 8 to 12 8-ounce glasses of fluids each day. This may include water; orange juice; lemonade; apple, grape, and cranberry juice; clear fruit drinks; electrolyte replacement and sports drinks; and decaffeinated teas and coffee. If you have been diagnosed with a kidney disease, ask your doctor how much and what types of fluids you should drink to prevent dehydration. If you have kidney disease, drinking too much fluid can cause it build up in the your body and be dangerous to your health.  · Over-the-counter remedies won't shorten the length of the illness but may be helpful for cough, sore throat; and nasal and sinus congestion. Don't use decongestants if you have high blood pressure.    Follow-up care  Follow up with your healthcare provider if your symptoms continue for more than 7 days.    Fever Cough Body Aches Nausea and Vomiting Diarrhea Congestion or Runny Nose    OTC Tylenol   OTC NSAIDs  Tessalon Pearls   Phenergan DM   OTC cough medications  Mobic   OTC Tylenol   OTC NSAIDs  Zofran   Phenergan   OTC Emetrol  DO NOT take ant-diarrheal medications  OTC Claritin, Zyrtec, Allegra, OR Xyzal   OTC Flonase   OTC Benadryl      Cough Allergy Symptoms Asthma Headache or Body Aches   Tessalon Perles are a non-narcotic cough medicine. It works by numbing the throat and lungs, making the cough reflex less active, it is used to relieve coughing during the day. If you have been given Phenergan DM cough syrup, you do NOT need to take both medications at the same time.    Phenergan DM is a combination medication that is used to treat cough. Phenergan DM works like an antihistamine and cough suppressant. This medication will make you sleepy like Benadryl, have a drying effect, and act on a part of the brain (cough center) to reduce the need to cough. DO NOT take Benadryl and Phenergan together. You may take over-the-counter claritin, zyrtec, allegra, or  "xyzal as directed, these are antihistamines that will work to dry up secretions/mucus. Antihistamines work to block the effects of a certain natural substance (histamine), which causes allergy symptoms.    OR    Use over-the-counter Flonase (a nasal steroid) or prescribed Azelastine (a nasal antihistamine) to treat runny nose, sneezing, itchy nose, nasal congestion, and postnasal drip.    Proper administration is to "look down at your toes and aim for your nose". The goal is to aim for your nasolabial folds, the creases in your nose. If you feel the medication drip down your throat, you have NOT administered it correctly. If you can "smell the roses" (floral scent), then you have administered it correctly. If you have a history of asthma, expressed a concern about wheezing or have been told you were wheezing during your exam today, you are being given Albuterol. Albuterol is a bronchodilator that relaxes muscles in the airways and increases air flow to the lungs; it is used to treat or prevent bronchospasm, or narrowing of the airways in the lungs.     If you have a history of asthma, expressed a concern about wheezing or have been told you were wheezing during your exam today and are NOT being prescribed Albuterol that is because you have insured me that you have an adequate supply of the drug at home.    Mobic is a nonsteroidal anti-inflammatory drug (NSAID), it is used to treat inflammation. It reduces pain, swelling, and stiffness of the joints. Please do NOT take any other NSAID medications while on this medication, you can take Tylenol if you feel the need. If you were not prescribed an anti-inflammatory medication, and if you do not have any history of stomach/intestinal ulcers, or kidney disease, or are not taking a blood thinner such as Coumadin, Plavix, Pradaxa, Eloquis, or Xaralta for example, it is OK to take over the counter Ibuprofen or Advil or Motrin or Aleve as directed.  Do not take any of these " medications on an empty stomach.         Dehydration (Adult)  Dehydration occurs when your body loses too much fluid. This may be the result of prolonged vomiting or diarrhea, excessive sweating, or a high fever. It may also happen if you don't drink enough fluid when you're sick or out in the heat. Misuse of diuretics (water pills) can also be a cause.  Symptoms include thirst and decreased urine output. You may also feel dizzy, weak, fatigued, or very drowsy. The diet described below is usually enough to treat dehydration. In some cases, you may need medicine.    Home care  · Drink at least 12 8-ounce glasses of fluid every day to resolve the dehydration. Fluid may include water; orange juice; lemonade; apple, grape, or cranberry juice; clear fruit drinks; electrolyte replacement and sports drinks; and teas and coffee without caffeine. If you have been diagnosed with a kidney disease, ask your doctor how much and what types of fluids you should drink to prevent dehydration. If you have kidney disease, fluid can build up in the body. This can be dangerous to your health.  · If you have a fever, muscle aches, or a headache as a result of a cold or flu, you may take acetaminophen or ibuprofen, unless another medicine was prescribed. If you have chronic liver or kidney disease, or have ever had a stomach ulcer or gastrointestinal bleeding, talk with your health care provider before using these medicines. Don't take aspirin if you are younger than 18 and have a fever. Aspirin raises the chance for severe liver injury.    When to seek medical advice  DEHYDRATION:  · Continued vomiting  · Frequent diarrhea (more than 5 times a day); blood (red or black color) or mucus in diarrhea  · Blood in vomit or stool  · Swollen abdomen or increasing abdominal pain  · Weakness, dizziness, or fainting  · Unusual drowsiness or confusion  · Reduced urine output or extreme thirst    FEVER:  Call your healthcare provider right away if  any of these occur:  · Your child is 3 months old or younger and has a fever of 100.4°F (38°C) or higher. Get medical care right away. Fever in a young baby can be a sign of a dangerous infection.  · Your child is of any age and has repeated fevers above 104°F (40°C).  · Your child is younger than 2 years of age and a fever of 100.4°F (38°C) continues for more than 1 day.  · A fever of 100.4°F (38°C) for more than 3 days in anyone older than 2 years of age.       Why didn't I get a steroid today?    The benefits are small and, unlike topical glucocorticoids, systemic glucocorticoids possess a significant side effect profile. Major side effects include:    · Effects immunity predisposing you to getting a more severe infection and increases your white blood cell count. You have the flu, you do not need anything to weaken your immune system right now.  · Young children -- Growth impairment   · Skin consequences: Skin thinning and ecchymoses, Cushingoid appearance (rounded face), acne, weight gain, mild hirsutism, facial erythema, and striae.  · Eye consequences: Cataracts, increased intraocular pressure, exophthalmos.   · Cardiovascular consequences: Fluid retention, premature atherosclerotic disease, and arrhythmias.   · GI consequences: Increased risk for adverse gastrointestinal effects, such as gastritis, ulcer formation, and gastrointestinal bleeding  · Bone and muscle consequences: Osteoporosis, osteonecrosis, and myopathy.  · Neuropsychiatric effects: Mood disorders, psychosis, memory impairment, and   · Metabolic and Endocrine consequences: Suppress the hypothalamic-pituitary-adrenal (HPA) axis and increase blood sugar.     Can I just have a shot instead of pills?  No. If you are sick, you need a course of treatment (not just a one time dose).     Why didn't I get an antibiotic today?  You have been diagnosed with a virus an antibiotic will not help you. Antibiotics work by destroying the cell walls of  "bacteria, viruses do not have cell walls.     When to seek medical advice:    DEHYDRATION:  · Continued vomiting  · Frequent diarrhea (more than 5 times a day); blood (red or black color) or mucus in diarrhea  · Blood in vomit or stool  · Swollen abdomen or increasing abdominal pain  · Reduced urine output or extreme thirst  · Repeated vomiting after the first 4 hours on fluids  · Occasional vomiting for more than 48 hours  · Frequent diarrhea (more than 5 times a day), blood in diarrhea (red or black color), or mucus in diarrhea  · Blood in vomit or stool  · Swollen abdomen or signs of abdominal pain  · No urine for 8 hours, no tears when crying, "sunken" eyes, or dry mouth  · Unusual behavior changes, fussiness, drowsiness, confusion, or seizure  · Fever (see Fever and children, below)  FEVER:  Call your healthcare provider right away if any of these occur:  · Your child is 3 months old or younger and has a fever of 100.4°F (38°C) or higher. Get medical care right away. Fever in a young baby can be a sign of a dangerous infection.  · Your child is of any age and has repeated fevers above 104°F (40°C).  · Your child is younger than 2 years of age and a fever of 100.4°F (38°C) continues for more than 1 day.  · A fever of 100.4°F (38°C) for more than 3 days in anyone older than 2 years of age.  Call 911  Call 911 or your local emergency services number if the child shows any of these symptoms or signs:  · Trouble breathing  · Confusion  · Is very drowsy or difficult to awaken  · Fainting or loss of consciousness  · Rapid heart rate  · Seizure  · Stiff neck     Elevated Blood Pressure    Your blood pressure was elevated during your visit to the urgent care.     It was not so high that immediate care was needed, but it is recommended that you monitor your blood pressure over the next week or two to make sure that it is not staying elevated.      Please keep a log/diary of your blood pressure readings and notify your " primary care physician. Keep all that information and take it with you to see your primary care physician.     If you are taking antihypertensives, please continue your medication as prescribed.      If your blood pressure is consistently above 140/90 you will need to follow-up with your primary care physician.   more quickly.     According to ACEP guidelines, workup and treatment of hypertension in asymptomatic patient is not warranted in the ED: (1) Initiating treatment for asymptomatic hypertension in the ED is not necessary when patients have follow-up; (2) Rapidly lowering blood pressure in asymptomatic patients in the ED is unnecessary and may be harmful in some patients; (3) When ED treatment for asymptomatic hypertension is initiated, blood pressure management should attempt to gradually lower blood pressure and should not be expected to be normalized during the initial ED visit.        Call your doctor immediately or seek emergency care if you have any of the following:  · Chest pain or shortness of breath (call 911)  · Moderate to severe headache  · Weakness in the muscles of your face, arms, or legs  · Trouble speaking  · Extreme drowsiness  · Confusion  · Fainting or dizziness  · Pulsating or rushing sound in your ears  · Unexplained nosebleed  · Weakness, tingling, or numbness of your face, arms, or legs  · Change in vision  · Blood pressure measured at home that is greater than 180/110     Changes You Can Make to Manage High Blood Pressure:    Healthy Diet  Shaking the Salt Habit  Potassium: Foods that are rich in potassium are important in managing high blood pressure (HBP or hypertension) because potassium lessens the effects of sodium.  Alcohol: Drinking too much alcohol can raise your blood pressure.   Getting Active  Managing Stress  Managing Weight  NOT Smoking  Medications: When blood pressure is high, high blood pressure medication may be an important part of your treatment.  OTC Medications:  See below for some medications to avoid.  Partnering With Your Doctor: According to American Heart Association recommendations, people being treated but still not reaching their target blood pressure -- should be a healthcare priority. A key to better control is a good doctor-patient partnership.    People with high blood pressure should be aware that the use of decongestants may raise blood pressure or interfere with the effectiveness of some prescribed blood pressure medications. Be aware of over-the-counter cold and flu preparations that contain decongestants such as:    Oxymetazoline  Phenylephrine  Pseudoephedrine      Your provider discussed your plan of care with you during your physical exam. It was reviewed once more by the provider when giving you an after visit summary. If the patient is a minor, the discharge instructions were discussed with an adult and that adult acknowledge their understanding of the provider's teaching.

## 2020-03-10 NOTE — PROGRESS NOTES
Patient is ill and unable to come to office visit. Her prescriptions were refilled.  All medications including hydrocodone 10 mg, ibuprofen 800 mg, Flexeril 10 mg, and Lidocaine patches were refilled at this time.  ARIK

## 2020-03-13 ENCOUNTER — TELEPHONE (OUTPATIENT)
Dept: URGENT CARE | Facility: CLINIC | Age: 70
End: 2020-03-13

## 2020-03-13 RX ORDER — AZITHROMYCIN 250 MG/1
250 TABLET, FILM COATED ORAL DAILY
Qty: 6 TABLET | Refills: 0 | Status: SHIPPED | OUTPATIENT
Start: 2020-03-13 | End: 2020-03-18

## 2020-03-13 NOTE — TELEPHONE ENCOUNTER
Patient called stating that she is still having symptoms are not improving after visit earlier this week.  The provider who saw her initially said she would call in antibiotics for symptoms were not improved by today.  Patient has been able to tolerate a Z-Mike in the past while call in in at this time.  Patient voiced her understanding and is in agreement with this.

## 2020-03-30 DIAGNOSIS — R09.89 CHEST CONGESTION: ICD-10-CM

## 2020-04-01 RX ORDER — ALBUTEROL SULFATE 90 UG/1
AEROSOL, METERED RESPIRATORY (INHALATION)
Qty: 6.7 G | OUTPATIENT
Start: 2020-04-01

## 2020-04-14 ENCOUNTER — OFFICE VISIT (OUTPATIENT)
Dept: URGENT CARE | Facility: CLINIC | Age: 70
End: 2020-04-14
Payer: OTHER GOVERNMENT

## 2020-04-14 VITALS — OXYGEN SATURATION: 95 % | TEMPERATURE: 98 F

## 2020-04-14 DIAGNOSIS — M54.50 CHRONIC BILATERAL LOW BACK PAIN WITHOUT SCIATICA: ICD-10-CM

## 2020-04-14 DIAGNOSIS — G89.29 CHRONIC CERVICAL PAIN: ICD-10-CM

## 2020-04-14 DIAGNOSIS — M47.814 SPONDYLOSIS OF THORACIC REGION WITHOUT MYELOPATHY OR RADICULOPATHY: ICD-10-CM

## 2020-04-14 DIAGNOSIS — M51.36 DEGENERATION OF LUMBAR INTERVERTEBRAL DISC: ICD-10-CM

## 2020-04-14 DIAGNOSIS — M54.2 CHRONIC CERVICAL PAIN: ICD-10-CM

## 2020-04-14 DIAGNOSIS — S33.5XXD SPRAIN OF LIGAMENTS OF LUMBAR SPINE, SUBSEQUENT ENCOUNTER: Primary | ICD-10-CM

## 2020-04-14 DIAGNOSIS — G89.29 CHRONIC BILATERAL LOW BACK PAIN WITHOUT SCIATICA: ICD-10-CM

## 2020-04-14 PROCEDURE — 99214 PR OFFICE/OUTPT VISIT, EST, LEVL IV, 30-39 MIN: ICD-10-PCS | Mod: S$GLB,,, | Performed by: PREVENTIVE MEDICINE

## 2020-04-14 PROCEDURE — 99214 OFFICE O/P EST MOD 30 MIN: CPT | Mod: S$GLB,,, | Performed by: PREVENTIVE MEDICINE

## 2020-04-14 RX ORDER — TIZANIDINE 4 MG/1
4 TABLET ORAL EVERY 6 HOURS PRN
Qty: 60 TABLET | Refills: 1 | Status: SHIPPED | OUTPATIENT
Start: 2020-04-14 | End: 2020-04-24

## 2020-04-14 RX ORDER — HYDROCODONE BITARTRATE AND ACETAMINOPHEN 10; 325 MG/1; MG/1
1 TABLET ORAL EVERY 6 HOURS PRN
Qty: 90 TABLET | Refills: 0 | Status: SHIPPED | OUTPATIENT
Start: 2020-04-14 | End: 2020-05-21 | Stop reason: SDUPTHER

## 2020-04-14 RX ORDER — LIDOCAINE 50 MG/G
1 PATCH TOPICAL DAILY
Qty: 30 PATCH | Refills: 2 | Status: SHIPPED | OUTPATIENT
Start: 2020-04-14 | End: 2020-06-18 | Stop reason: SDUPTHER

## 2020-04-14 NOTE — PROGRESS NOTES
Subjective:       Patient ID: Sasha Michelle is a 69 y.o. female.    Chief Complaint: Back Pain    RV- Pt is being seen today for her - Lower back injury - [employer] - Patient states she still feels a burning and throbbing pain in her lower back. Pain level 8/10 on today. When she sits it hurts the most and she is currently taking Vicodin and muscle relaxer and Lidocaine patches at night it helps .Pt states that she think she pull a muscle again last Thursday .  LN    Back Pain   This is a recurrent problem. The current episode started more than 1 month ago. The problem occurs constantly. The problem is unchanged. The pain is present in the lumbar spine. The quality of the pain is described as burning (throbbing). The pain does not radiate. The pain is at a severity of 8/10. The pain is moderate. The pain is the same all the time. The symptoms are aggravated by sitting, position, lying down and bending. Pertinent negatives include no headaches, leg pain, numbness or tingling. She has tried muscle relaxant for the symptoms.       Constitution: Negative for chills.   HENT: Negative for congestion, sinus pain and sinus pressure.    Musculoskeletal: Positive for back pain. Negative for pain, joint pain, muscle cramps and muscle ache.   Allergic/Immunologic: Negative for itching and sneezing.   Neurological: Negative for headaches, numbness and tingling.        Objective:      Physical Exam   Constitutional: She appears well-developed and well-nourished.   HENT:   Head: Normocephalic and atraumatic.   Eyes: Pupils are equal, round, and reactive to light. EOM are normal.   Neck: Normal range of motion. Neck supple.   Cardiovascular: Normal rate.   Pulmonary/Chest: Effort normal.   Musculoskeletal:        Cervical back: She exhibits decreased range of motion, tenderness and pain. She exhibits no bony tenderness, no swelling, no edema, no deformity, no laceration, no spasm and normal pulse.        Lumbar back:  She exhibits decreased range of motion, tenderness, pain and spasm. She exhibits no bony tenderness, no swelling, no edema, no deformity, no laceration and normal pulse.        Back:    Pain persists and is unchanged with palpation of mid and  low back without spasm. Pain  with flexion to 90, extension to 25 and lateral bending to 25 degrees. No motor or sensory deficits.   Patient also has pain about thoracic spine with palpation and range of motion testing. Pain with flexion of neck to 25 degrees, extension to 10 degrees and later bending to 25 degrees.   Neurological: She is alert.   No focal neurologic deficits   Skin: Skin is warm.   Psychiatric: She has a normal mood and affect. Her behavior is normal. Judgment and thought content normal.   Nursing note and vitals reviewed.      Assessment:       1. Sprain of ligaments of lumbar spine, subsequent encounter    2. Chronic bilateral low back pain without sciatica    3. Chronic cervical pain    4. Degeneration of lumbar intervertebral disc    5. Spondylosis of thoracic region without myelopathy or radiculopathy        Plan:     patient had recurrence episode of significant pain about her right low back with minimal increase in activity.  She continues to apply warm soaks with exercises daily.    Medications Ordered This Encounter   Medications    HYDROcodone-acetaminophen (NORCO)  mg per tablet     Sig: Take 1 tablet by mouth every 6 (six) hours as needed for Pain.     Dispense:  90 tablet     Refill:  0     Quantity prescribed more than 7 day supply? Yes, quantity medically necessary    lidocaine (LIDODERM) 5 %     Sig: Place 1 patch onto the skin once daily. Remove & Discard patch within 12 hours or as directed by MD     Dispense:  30 patch     Refill:  2    tiZANidine (ZANAFLEX) 4 MG tablet     Sig: Take 1 tablet (4 mg total) by mouth every 6 (six) hours as needed.     Dispense:  60 tablet     Refill:  1     Patient Instructions: Daily home  exercises/warm soaks   Restrictions: Disabled until next office visit  Follow up in about 4 weeks (around 5/12/2020).

## 2020-04-14 NOTE — LETTER
[facility]  [address] [facility], [address]  [place] [address]  Phone: [phone]  Fax: [phone]  [facility]: [phone]    Pt Name: Sasha John Date: 03/15/2003   Employee ID: [id number] Date of  Treatment: 04/14/2020   Company: [employer]      Appointment Time: 02:30pm Arrived: 2:30pm   Provider: Miguel Staples MD Time Out:3:45pm     Office Treatment:     1. Sprain of ligaments of lumbar spine, subsequent encounter    2. Chronic bilateral low back pain without sciatica    3. Chronic cervical pain    4. Degeneration of lumbar intervertebral disc    5. Spondylosis of thoracic region without myelopathy or radiculopathy      Medications Ordered This Encounter   Medications    HYDROcodone-acetaminophen (NORCO)  mg per tablet    lidocaine (LIDODERM) 5 %    tiZANidine (ZANAFLEX) 4 MG tablet      Patient Instructions: Daily home exercises/warm soaks    Restrictions: Disabled until next office visit     Return Appointment: 5/12/2020 at 2:30pm    BIJAL

## 2020-05-21 ENCOUNTER — OFFICE VISIT (OUTPATIENT)
Dept: URGENT CARE | Facility: CLINIC | Age: 70
End: 2020-05-21
Payer: OTHER GOVERNMENT

## 2020-05-21 DIAGNOSIS — M47.814 SPONDYLOSIS OF THORACIC REGION WITHOUT MYELOPATHY OR RADICULOPATHY: ICD-10-CM

## 2020-05-21 DIAGNOSIS — G89.29 CHRONIC CERVICAL PAIN: ICD-10-CM

## 2020-05-21 DIAGNOSIS — M54.50 CHRONIC BILATERAL LOW BACK PAIN WITHOUT SCIATICA: ICD-10-CM

## 2020-05-21 DIAGNOSIS — M54.2 CHRONIC CERVICAL PAIN: ICD-10-CM

## 2020-05-21 DIAGNOSIS — S33.5XXD SPRAIN OF LIGAMENTS OF LUMBAR SPINE, SUBSEQUENT ENCOUNTER: Primary | ICD-10-CM

## 2020-05-21 DIAGNOSIS — M51.36 DEGENERATION OF LUMBAR INTERVERTEBRAL DISC: ICD-10-CM

## 2020-05-21 DIAGNOSIS — G89.29 CHRONIC BILATERAL LOW BACK PAIN WITHOUT SCIATICA: ICD-10-CM

## 2020-05-21 PROCEDURE — 99212 OFFICE O/P EST SF 10 MIN: CPT | Mod: S$GLB,,, | Performed by: PREVENTIVE MEDICINE

## 2020-05-21 PROCEDURE — 99212 PR OFFICE/OUTPT VISIT, EST, LEVL II, 10-19 MIN: ICD-10-PCS | Mod: S$GLB,,, | Performed by: PREVENTIVE MEDICINE

## 2020-05-21 RX ORDER — HYDROCODONE BITARTRATE AND ACETAMINOPHEN 10; 325 MG/1; MG/1
1 TABLET ORAL EVERY 6 HOURS PRN
Qty: 90 TABLET | Refills: 0 | Status: SHIPPED | OUTPATIENT
Start: 2020-05-21 | End: 2020-06-18 | Stop reason: SDUPTHER

## 2020-05-21 NOTE — PROGRESS NOTES
Subjective:       Patient ID: Sasha Michelle is a 69 y.o. female.    Chief Complaint: back pain  Patient returns for refill of pain medication only.    ROS     Objective:      Physical Exam    Assessment:       1. Sprain of ligaments of lumbar spine, subsequent encounter    2. Chronic bilateral low back pain without sciatica    3. Chronic cervical pain    4. Degeneration of lumbar intervertebral disc    5. Spondylosis of thoracic region without myelopathy or radiculopathy        Plan:         Medications Ordered This Encounter   Medications    HYDROcodone-acetaminophen (NORCO)  mg per tablet     Sig: Take 1 tablet by mouth every 6 (six) hours as needed for Pain.     Dispense:  90 tablet     Refill:  0     Quantity prescribed more than 7 day supply? Yes, quantity medically necessary     Patient Instructions: Daily home exercises/warm soaks   Restrictions: Disabled until next office visit  Follow up in about 4 weeks (around 6/18/2020).

## 2020-06-18 ENCOUNTER — OFFICE VISIT (OUTPATIENT)
Dept: URGENT CARE | Facility: CLINIC | Age: 70
End: 2020-06-18
Payer: OTHER GOVERNMENT

## 2020-06-18 DIAGNOSIS — G89.29 CHRONIC BILATERAL LOW BACK PAIN WITHOUT SCIATICA: ICD-10-CM

## 2020-06-18 DIAGNOSIS — S33.5XXD SPRAIN OF LIGAMENTS OF LUMBAR SPINE, SUBSEQUENT ENCOUNTER: Primary | ICD-10-CM

## 2020-06-18 DIAGNOSIS — G89.29 CHRONIC CERVICAL PAIN: ICD-10-CM

## 2020-06-18 DIAGNOSIS — M54.50 CHRONIC BILATERAL LOW BACK PAIN WITHOUT SCIATICA: ICD-10-CM

## 2020-06-18 DIAGNOSIS — M54.2 CHRONIC CERVICAL PAIN: ICD-10-CM

## 2020-06-18 DIAGNOSIS — M51.36 DEGENERATION OF LUMBAR INTERVERTEBRAL DISC: ICD-10-CM

## 2020-06-18 DIAGNOSIS — M47.814 SPONDYLOSIS OF THORACIC REGION WITHOUT MYELOPATHY OR RADICULOPATHY: ICD-10-CM

## 2020-06-18 PROCEDURE — 99214 PR OFFICE/OUTPT VISIT, EST, LEVL IV, 30-39 MIN: ICD-10-PCS | Mod: S$GLB,,, | Performed by: PREVENTIVE MEDICINE

## 2020-06-18 PROCEDURE — 99214 OFFICE O/P EST MOD 30 MIN: CPT | Mod: S$GLB,,, | Performed by: PREVENTIVE MEDICINE

## 2020-06-18 RX ORDER — LIDOCAINE 50 MG/G
1 PATCH TOPICAL DAILY
Qty: 30 PATCH | Refills: 2 | Status: SHIPPED | OUTPATIENT
Start: 2020-06-18 | End: 2020-07-16 | Stop reason: SDUPTHER

## 2020-06-18 RX ORDER — HYDROCODONE BITARTRATE AND ACETAMINOPHEN 10; 325 MG/1; MG/1
1 TABLET ORAL EVERY 6 HOURS PRN
Qty: 90 TABLET | Refills: 0 | Status: SHIPPED | OUTPATIENT
Start: 2020-06-18 | End: 2020-07-16 | Stop reason: SDUPTHER

## 2020-06-18 NOTE — PROGRESS NOTES
Subjective:       Patient ID: Sasha Michelle is a 69 y.o. female.    Chief Complaint: Back Pain    RV- Pt is being seen today for her - Lower back injury - [employer] - Patient states she still feels a burning and throbbing pain in her lower back. Pain level 5/10 on today.Pt states that she think she pull a muscle again last Thursday . IJ    Back Pain  This is a recurrent problem. The current episode started more than 1 month ago. The problem occurs constantly. The problem is unchanged. The pain is present in the lumbar spine. The quality of the pain is described as burning (throbbing). The pain does not radiate. The pain is at a severity of 5/10. The pain is moderate. The pain is the same all the time. The symptoms are aggravated by sitting, position, lying down and bending. Pertinent negatives include no headaches, leg pain, numbness or tingling. She has tried muscle relaxant for the symptoms.       Constitution: Negative for chills.   HENT: Negative for congestion, sinus pain and sinus pressure.    Musculoskeletal: Positive for back pain. Negative for pain, joint pain, muscle cramps and muscle ache.   Allergic/Immunologic: Negative for itching and sneezing.   Neurological: Negative for headaches, numbness and tingling.        Objective:      Physical Exam  Vitals signs and nursing note reviewed.   Constitutional:       Appearance: She is well-developed.   HENT:      Head: Normocephalic and atraumatic.   Eyes:      Pupils: Pupils are equal, round, and reactive to light.   Neck:      Musculoskeletal: Normal range of motion and neck supple.   Cardiovascular:      Rate and Rhythm: Normal rate.   Pulmonary:      Effort: Pulmonary effort is normal.   Musculoskeletal:      Cervical back: She exhibits decreased range of motion, tenderness and pain. She exhibits no bony tenderness, no swelling, no edema, no deformity, no laceration, no spasm and normal pulse.      Lumbar back: She exhibits decreased range of  motion, tenderness, pain and spasm. She exhibits no bony tenderness, no swelling, no edema, no deformity, no laceration and normal pulse.        Back:       Comments: Pain persists and is unchanged with palpation of mid and  low back without spasm. Pain  with flexion to 90, extension to 25 and lateral bending to 25 degrees. No motor or sensory deficits.   Patient also has pain about thoracic spine with palpation and range of motion testing. Pain with flexion of neck to 25 degrees, extension to 10 degrees and later bending to 25 degrees.   Skin:     General: Skin is warm.   Neurological:      Mental Status: She is alert.      Comments: No focal neurologic deficits   Psychiatric:         Behavior: Behavior normal.         Thought Content: Thought content normal.         Judgment: Judgment normal.         Assessment:       1. Sprain of ligaments of lumbar spine, subsequent encounter    2. Chronic bilateral low back pain without sciatica    3. Chronic cervical pain    4. Degeneration of lumbar intervertebral disc    5. Spondylosis of thoracic region without myelopathy or radiculopathy        Plan:         Medications Ordered This Encounter   Medications    HYDROcodone-acetaminophen (NORCO)  mg per tablet     Sig: Take 1 tablet by mouth every 6 (six) hours as needed for Pain.     Dispense:  90 tablet     Refill:  0     Quantity prescribed more than 7 day supply? Yes, quantity medically necessary    lidocaine (LIDODERM) 5 %     Sig: Place 1 patch onto the skin once daily. Remove & Discard patch within 12 hours or as directed by MD     Dispense:  30 patch     Refill:  2     Patient Instructions: Daily home exercises/warm soaks   Restrictions: Disabled until next office visit  Follow up in about 4 weeks (around 7/16/2020).

## 2020-06-18 NOTE — LETTER
[facility]  [address] [address]  Phone: [phone]  Fax: [phone]  [facility]: [id number]    Pt Name: Sasha Michelle  Injury Date: 03/15/2001   Employee ID: [id number] Date of Treatment: 06/18/2020   Company:      Appointment Time: 09:45 AM Arrived: 9:00 AM   Provider: Miguel Staples MD Time Out: 10:10 AM     Office Treatment:   EXAM  RX GIVEN  DISABLED UNTIL NEXT OFFICE VISIT     1. Sprain of ligaments of lumbar spine, subsequent encounter    2. Chronic bilateral low back pain without sciatica    3. Chronic cervical pain    4. Degeneration of lumbar intervertebral disc    5. Spondylosis of thoracic region without myelopathy or radiculopathy      Medications Ordered This Encounter   Medications    HYDROcodone-acetaminophen (NORCO)  mg per tablet    lidocaine (LIDODERM) 5 %      Patient Instructions: Daily home exercises/warm soaks    Restrictions: Disabled until next office visit     Return Appointment: 07/16/2020 at 9:30 AM  GELACIO

## 2020-07-16 ENCOUNTER — OFFICE VISIT (OUTPATIENT)
Dept: URGENT CARE | Facility: CLINIC | Age: 70
End: 2020-07-16
Payer: OTHER GOVERNMENT

## 2020-07-16 DIAGNOSIS — M54.2 CHRONIC CERVICAL PAIN: ICD-10-CM

## 2020-07-16 DIAGNOSIS — S33.5XXD SPRAIN OF LIGAMENTS OF LUMBAR SPINE, SUBSEQUENT ENCOUNTER: Primary | ICD-10-CM

## 2020-07-16 DIAGNOSIS — M47.814 SPONDYLOSIS OF THORACIC REGION WITHOUT MYELOPATHY OR RADICULOPATHY: ICD-10-CM

## 2020-07-16 DIAGNOSIS — G89.29 CHRONIC BILATERAL LOW BACK PAIN WITHOUT SCIATICA: ICD-10-CM

## 2020-07-16 DIAGNOSIS — G89.29 CHRONIC CERVICAL PAIN: ICD-10-CM

## 2020-07-16 DIAGNOSIS — M54.50 CHRONIC BILATERAL LOW BACK PAIN WITHOUT SCIATICA: ICD-10-CM

## 2020-07-16 DIAGNOSIS — M51.36 DEGENERATION OF LUMBAR INTERVERTEBRAL DISC: ICD-10-CM

## 2020-07-16 PROCEDURE — 99214 PR OFFICE/OUTPT VISIT, EST, LEVL IV, 30-39 MIN: ICD-10-PCS | Mod: S$GLB,,, | Performed by: PREVENTIVE MEDICINE

## 2020-07-16 PROCEDURE — 99214 OFFICE O/P EST MOD 30 MIN: CPT | Mod: S$GLB,,, | Performed by: PREVENTIVE MEDICINE

## 2020-07-16 RX ORDER — HYDROCODONE BITARTRATE AND ACETAMINOPHEN 10; 325 MG/1; MG/1
1 TABLET ORAL EVERY 6 HOURS PRN
Qty: 90 TABLET | Refills: 0 | Status: SHIPPED | OUTPATIENT
Start: 2020-07-16 | End: 2020-08-18 | Stop reason: SDUPTHER

## 2020-07-16 RX ORDER — LIDOCAINE 50 MG/G
1 PATCH TOPICAL DAILY
Qty: 30 PATCH | Refills: 2 | Status: SHIPPED | OUTPATIENT
Start: 2020-07-16 | End: 2020-08-18 | Stop reason: SDUPTHER

## 2020-07-16 RX ORDER — ORPHENADRINE CITRATE 100 MG/1
100 TABLET, EXTENDED RELEASE ORAL 2 TIMES DAILY
Qty: 60 TABLET | Refills: 1 | Status: SHIPPED | OUTPATIENT
Start: 2020-07-16 | End: 2020-08-18 | Stop reason: SDUPTHER

## 2020-07-16 RX ORDER — IBUPROFEN 800 MG/1
800 TABLET ORAL 3 TIMES DAILY
Qty: 60 TABLET | Refills: 1 | Status: SHIPPED | OUTPATIENT
Start: 2020-07-16 | End: 2020-08-18 | Stop reason: SDUPTHER

## 2020-07-16 NOTE — LETTER
[facility] - [address]  [place] [address]  Phone: [phone]  Fax: [phone]  [facility]: [id number]    Pt Name: Sasha Michelle  Injury Date: 03/15/2001   Employee ID: [id number] Date of Treatment: 07/16/2020   Company: [employer]      Appointment Time: 09:15 AM Arrived: 9:40 AM   Provider: Miguel Staples MD Time Out: 11:00 AM     Office Treatment:   EXAM  RX GIVEN  DISABLED UNTIL NEXT OFFICE VISIT     1. Sprain of ligaments of lumbar spine, subsequent encounter    2. Chronic bilateral low back pain without sciatica    3. Chronic cervical pain    4. Degeneration of lumbar intervertebral disc    5. Spondylosis of thoracic region without myelopathy or radiculopathy      Medications Ordered This Encounter   Medications    HYDROcodone-acetaminophen (NORCO)  mg per tablet    ibuprofen (ADVIL,MOTRIN) 800 MG tablet    lidocaine (LIDODERM) 5 %    orphenadrine (NORFLEX) 100 mg tablet      Patient Instructions: Daily home exercises/warm soaks    Restrictions: Disabled until next office visit     Return Appointment: 08/18/2020 at 9:00 AM  GELACIO

## 2020-07-16 NOTE — PROGRESS NOTES
Subjective:       Patient ID: Sasha Michelle is a 69 y.o. female.    Chief Complaint: Back Pain    RV- Pt is being seen today for her - Lower back injury - Patient states she still feels a burning and throbbing pain in her lower back. Pain level 5/10 on today. IJ    Back Pain  This is a recurrent problem. The current episode started more than 1 month ago. The problem occurs constantly. The problem is unchanged. The pain is present in the lumbar spine. The quality of the pain is described as burning (throbbing). The pain does not radiate. The pain is at a severity of 5/10. The pain is moderate. The pain is the same all the time. The symptoms are aggravated by sitting, position, lying down and bending. Pertinent negatives include no headaches, leg pain, numbness or tingling. She has tried muscle relaxant for the symptoms.       Constitution: Negative for chills.   HENT: Negative for congestion, sinus pain and sinus pressure.    Musculoskeletal: Positive for back pain. Negative for pain, joint pain, muscle cramps and muscle ache.   Allergic/Immunologic: Negative for itching and sneezing.   Neurological: Negative for headaches, numbness and tingling.        Objective:      Physical Exam  Vitals signs and nursing note reviewed.   Constitutional:       Appearance: She is well-developed.   HENT:      Head: Normocephalic and atraumatic.   Eyes:      Pupils: Pupils are equal, round, and reactive to light.   Neck:      Musculoskeletal: Normal range of motion and neck supple.   Cardiovascular:      Rate and Rhythm: Normal rate.   Pulmonary:      Effort: Pulmonary effort is normal.   Musculoskeletal:      Cervical back: She exhibits decreased range of motion, tenderness and pain. She exhibits no bony tenderness, no swelling, no edema, no deformity, no laceration, no spasm and normal pulse.      Lumbar back: She exhibits decreased range of motion, tenderness, pain and spasm. She exhibits no bony tenderness, no swelling, no  edema, no deformity, no laceration and normal pulse.        Back:       Comments: Pain persists with palpation of mid and  low back without spasm. Pain  with flexion to 90, extension to 25 and lateral bending to 25 degrees. No motor or sensory deficits.   Patient also has pain about thoracic spine with palpation and range of motion testing. Pain with flexion of neck to 25 degrees, extension to 10 degrees and later bending to 25 degrees.   Skin:     General: Skin is warm.   Neurological:      Mental Status: She is alert.      Comments: No focal neurologic deficits   Psychiatric:         Behavior: Behavior normal.         Thought Content: Thought content normal.         Judgment: Judgment normal.         Assessment:       1. Sprain of ligaments of lumbar spine, subsequent encounter    2. Chronic bilateral low back pain without sciatica    3. Chronic cervical pain    4. Degeneration of lumbar intervertebral disc    5. Spondylosis of thoracic region without myelopathy or radiculopathy        Plan:         Medications Ordered This Encounter   Medications    HYDROcodone-acetaminophen (NORCO)  mg per tablet     Sig: Take 1 tablet by mouth every 6 (six) hours as needed for Pain.     Dispense:  90 tablet     Refill:  0     Quantity prescribed more than 7 day supply? Yes, quantity medically necessary    ibuprofen (ADVIL,MOTRIN) 800 MG tablet     Sig: Take 1 tablet (800 mg total) by mouth 3 (three) times daily.     Dispense:  60 tablet     Refill:  1    lidocaine (LIDODERM) 5 %     Sig: Place 1 patch onto the skin once daily. Remove & Discard patch within 12 hours or as directed by MD     Dispense:  30 patch     Refill:  2    orphenadrine (NORFLEX) 100 mg tablet     Sig: Take 1 tablet (100 mg total) by mouth 2 (two) times daily.     Dispense:  60 tablet     Refill:  1     Patient Instructions: Daily home exercises/warm soaks   Restrictions: Disabled until next office visit  Follow up in about 1 month (around  8/18/2020).

## 2020-08-18 ENCOUNTER — OFFICE VISIT (OUTPATIENT)
Dept: URGENT CARE | Facility: CLINIC | Age: 70
End: 2020-08-18
Payer: OTHER GOVERNMENT

## 2020-08-18 DIAGNOSIS — M54.2 CHRONIC CERVICAL PAIN: ICD-10-CM

## 2020-08-18 DIAGNOSIS — S33.5XXD SPRAIN OF LIGAMENTS OF LUMBAR SPINE, SUBSEQUENT ENCOUNTER: Primary | ICD-10-CM

## 2020-08-18 DIAGNOSIS — M47.814 SPONDYLOSIS OF THORACIC REGION WITHOUT MYELOPATHY OR RADICULOPATHY: ICD-10-CM

## 2020-08-18 DIAGNOSIS — G89.29 CHRONIC BILATERAL LOW BACK PAIN WITHOUT SCIATICA: ICD-10-CM

## 2020-08-18 DIAGNOSIS — M51.36 DEGENERATION OF LUMBAR INTERVERTEBRAL DISC: ICD-10-CM

## 2020-08-18 DIAGNOSIS — M54.50 CHRONIC BILATERAL LOW BACK PAIN WITHOUT SCIATICA: ICD-10-CM

## 2020-08-18 DIAGNOSIS — G89.29 CHRONIC CERVICAL PAIN: ICD-10-CM

## 2020-08-18 PROCEDURE — 99214 OFFICE O/P EST MOD 30 MIN: CPT | Mod: S$GLB,,, | Performed by: PREVENTIVE MEDICINE

## 2020-08-18 PROCEDURE — 99214 PR OFFICE/OUTPT VISIT, EST, LEVL IV, 30-39 MIN: ICD-10-PCS | Mod: S$GLB,,, | Performed by: PREVENTIVE MEDICINE

## 2020-08-18 RX ORDER — LIDOCAINE 50 MG/G
1 PATCH TOPICAL DAILY
Qty: 30 PATCH | Refills: 2 | Status: SHIPPED | OUTPATIENT
Start: 2020-08-18 | End: 2020-09-15 | Stop reason: SDUPTHER

## 2020-08-18 RX ORDER — HYDROCODONE BITARTRATE AND ACETAMINOPHEN 10; 325 MG/1; MG/1
1 TABLET ORAL EVERY 6 HOURS PRN
Qty: 90 TABLET | Refills: 0 | Status: SHIPPED | OUTPATIENT
Start: 2020-08-18 | End: 2020-09-15 | Stop reason: SDUPTHER

## 2020-08-18 RX ORDER — IBUPROFEN 800 MG/1
800 TABLET ORAL 3 TIMES DAILY
Qty: 60 TABLET | Refills: 1 | Status: SHIPPED | OUTPATIENT
Start: 2020-08-18 | End: 2020-10-20 | Stop reason: SDUPTHER

## 2020-08-18 RX ORDER — ORPHENADRINE CITRATE 100 MG/1
100 TABLET, EXTENDED RELEASE ORAL 2 TIMES DAILY
Qty: 60 TABLET | Refills: 1 | Status: SHIPPED | OUTPATIENT
Start: 2020-08-18 | End: 2020-10-20 | Stop reason: SDUPTHER

## 2020-08-18 NOTE — LETTER
[facility] - [address] [place] [address]  Phone: [phone]  Fax: [phone]  [facility]: [id number]    Pt Name: Sasha Michelle  Injury Date: 03/15/2001   Employee ID: [id number] Date of Treatment: 08/18/2020   Company: [employer]      Appointment Time: 08:45 AM Arrived: 8:40 AM   Provider: Miguel Staples MD Time Out: 9:45 AM     Office Treatment:   1. Sprain of ligaments of lumbar spine, subsequent encounter    2. Chronic bilateral low back pain without sciatica    3. Chronic cervical pain    4. Degeneration of lumbar intervertebral disc    5. Spondylosis of thoracic region without myelopathy or radiculopathy      Medications Ordered This Encounter   Medications    HYDROcodone-acetaminophen (NORCO)  mg per tablet    ibuprofen (ADVIL,MOTRIN) 800 MG tablet    lidocaine (LIDODERM) 5 %    orphenadrine (NORFLEX) 100 mg tablet      Patient Instructions: Daily home exercises/warm soaks    Restrictions: Disabled until next office visit     Return Appointment: 09/15/2020 at 10:00 AM  GELACIO

## 2020-08-18 NOTE — PROGRESS NOTES
Subjective:       Patient ID: Sasha Michelle is a 69 y.o. female.    Chief Complaint: Back Pain    RV- Pt is being seen today for her - Lower back injury - Patient states she still feels a burning and throbbing pain in her lower back. She also states she needs a refill on her medication. Pain level 5/10 on today. IJ    Back Pain  This is a recurrent problem. The current episode started more than 1 month ago. The problem occurs constantly. The problem is unchanged. The pain is present in the lumbar spine. The quality of the pain is described as burning (throbbing). The pain does not radiate. The pain is at a severity of 5/10. The pain is moderate. The pain is the same all the time. The symptoms are aggravated by sitting, position, lying down and bending. Pertinent negatives include no headaches, leg pain, numbness or tingling. She has tried muscle relaxant for the symptoms.       Constitution: Negative for chills.   HENT: Negative for congestion, sinus pain and sinus pressure.    Musculoskeletal: Positive for back pain. Negative for pain, joint pain, muscle cramps and muscle ache.   Allergic/Immunologic: Negative for itching and sneezing.   Neurological: Negative for headaches, numbness and tingling.        Objective:      Physical Exam  Vitals signs and nursing note reviewed.   Constitutional:       Appearance: She is well-developed.   HENT:      Head: Normocephalic and atraumatic.   Eyes:      Pupils: Pupils are equal, round, and reactive to light.   Neck:      Musculoskeletal: Normal range of motion and neck supple.   Cardiovascular:      Rate and Rhythm: Normal rate.   Pulmonary:      Effort: Pulmonary effort is normal.   Musculoskeletal:      Cervical back: She exhibits decreased range of motion, tenderness and pain. She exhibits no bony tenderness, no swelling, no edema, no deformity, no laceration, no spasm and normal pulse.      Lumbar back: She exhibits decreased range of motion, tenderness, pain and spasm.  She exhibits no bony tenderness, no swelling, no edema, no deformity, no laceration and normal pulse.        Back:       Comments: Pain persists with palpation of mid and  low back without spasm. Pain  with flexion to 90, extension to 25 and lateral bending to 25 degrees. No motor or sensory deficits.   Patient also has pain about thoracic spine with palpation and range of motion testing. Pain with flexion of neck to 25 degrees, extension to 10 degrees and later bending to 25 degrees.   Skin:     General: Skin is warm.   Neurological:      Mental Status: She is alert.      Comments: No focal neurologic deficits   Psychiatric:         Behavior: Behavior normal.         Thought Content: Thought content normal.         Judgment: Judgment normal.         Assessment:       1. Sprain of ligaments of lumbar spine, subsequent encounter    2. Chronic bilateral low back pain without sciatica    3. Chronic cervical pain    4. Degeneration of lumbar intervertebral disc    5. Spondylosis of thoracic region without myelopathy or radiculopathy        Plan:         Medications Ordered This Encounter   Medications    HYDROcodone-acetaminophen (NORCO)  mg per tablet     Sig: Take 1 tablet by mouth every 6 (six) hours as needed for Pain.     Dispense:  90 tablet     Refill:  0     Quantity prescribed more than 7 day supply? Yes, quantity medically necessary    ibuprofen (ADVIL,MOTRIN) 800 MG tablet     Sig: Take 1 tablet (800 mg total) by mouth 3 (three) times daily.     Dispense:  60 tablet     Refill:  1    lidocaine (LIDODERM) 5 %     Sig: Place 1 patch onto the skin once daily. Remove & Discard patch within 12 hours or as directed by MD     Dispense:  30 patch     Refill:  2    orphenadrine (NORFLEX) 100 mg tablet     Sig: Take 1 tablet (100 mg total) by mouth 2 (two) times daily.     Dispense:  60 tablet     Refill:  1     Patient Instructions: Daily home exercises/warm soaks   Restrictions: Disabled until next office  visit  Follow up in about 4 weeks (around 9/15/2020).

## 2020-09-15 ENCOUNTER — OFFICE VISIT (OUTPATIENT)
Dept: URGENT CARE | Facility: CLINIC | Age: 70
End: 2020-09-15
Payer: OTHER GOVERNMENT

## 2020-09-15 DIAGNOSIS — M51.36 DEGENERATION OF LUMBAR INTERVERTEBRAL DISC: ICD-10-CM

## 2020-09-15 DIAGNOSIS — M54.50 CHRONIC BILATERAL LOW BACK PAIN WITHOUT SCIATICA: ICD-10-CM

## 2020-09-15 DIAGNOSIS — G89.29 CHRONIC BILATERAL LOW BACK PAIN WITHOUT SCIATICA: ICD-10-CM

## 2020-09-15 DIAGNOSIS — S33.5XXD SPRAIN OF LIGAMENTS OF LUMBAR SPINE, SUBSEQUENT ENCOUNTER: Primary | ICD-10-CM

## 2020-09-15 DIAGNOSIS — M54.2 CHRONIC CERVICAL PAIN: ICD-10-CM

## 2020-09-15 DIAGNOSIS — G89.29 CHRONIC CERVICAL PAIN: ICD-10-CM

## 2020-09-15 DIAGNOSIS — M47.814 SPONDYLOSIS OF THORACIC REGION WITHOUT MYELOPATHY OR RADICULOPATHY: ICD-10-CM

## 2020-09-15 PROCEDURE — 99214 OFFICE O/P EST MOD 30 MIN: CPT | Mod: S$GLB,,, | Performed by: PREVENTIVE MEDICINE

## 2020-09-15 PROCEDURE — 99214 PR OFFICE/OUTPT VISIT, EST, LEVL IV, 30-39 MIN: ICD-10-PCS | Mod: S$GLB,,, | Performed by: PREVENTIVE MEDICINE

## 2020-09-15 RX ORDER — HYDROCODONE BITARTRATE AND ACETAMINOPHEN 10; 325 MG/1; MG/1
1 TABLET ORAL EVERY 6 HOURS PRN
Qty: 90 TABLET | Refills: 0 | Status: SHIPPED | OUTPATIENT
Start: 2020-09-15 | End: 2020-10-20 | Stop reason: SDUPTHER

## 2020-09-15 RX ORDER — LIDOCAINE 50 MG/G
1 PATCH TOPICAL DAILY
Qty: 30 PATCH | Refills: 2 | Status: SHIPPED | OUTPATIENT
Start: 2020-09-15 | End: 2020-10-20 | Stop reason: SDUPTHER

## 2020-09-15 NOTE — LETTER
[facility] - [address] [place] [address]  Phone: [phone]  Fax: [phone]  [facility]: [id number]    Pt Name: Sasha Michelle  Injury Date: 03/15/2001   Employee ID: [id number] Date of  Treatment: 09/15/2020   Company: [employer]      Appointment Time: 09:45 AM Arrived: 10:00 AM   Provider: Miguel Staples MD Time Out: 10:50 AM     Office Treatment:     1. Sprain of ligaments of lumbar spine, subsequent encounter    2. Chronic bilateral low back pain without sciatica    3. Chronic cervical pain    4. Degeneration of lumbar intervertebral disc    5. Spondylosis of thoracic region without myelopathy or radiculopathy      Medications Ordered This Encounter   Medications    HYDROcodone-acetaminophen (NORCO)  mg per tablet    lidocaine (LIDODERM) 5 %      Patient Instructions: Daily home exercises/warm soaks    Restrictions: Disabled until next office visit     Return Appointment: 10/20/2020 at 10:00 AM  GELACIO

## 2020-09-15 NOTE — PROGRESS NOTES
Subjective:       Patient ID: Sasha Michelle is a 69 y.o. female.    Chief Complaint: Back Pain    RV- Pt is being seen today for her - Lower back injury - Patient states she still feels a burning and throbbing pain in her lower back. She also states she needs a refill on her medication. Pain level 5/10 on today. IJ    Back Pain  This is a recurrent problem. The current episode started more than 1 month ago. The problem occurs constantly. The problem is unchanged. The pain is present in the lumbar spine. The quality of the pain is described as burning (throbbing). The pain does not radiate. The pain is at a severity of 5/10. The pain is moderate. The pain is the same all the time. The symptoms are aggravated by sitting, position, lying down and bending. Pertinent negatives include no headaches, leg pain, numbness or tingling. She has tried muscle relaxant for the symptoms.       Constitution: Negative for chills.   HENT: Negative for congestion, sinus pain and sinus pressure.    Musculoskeletal: Positive for back pain. Negative for pain, joint pain, muscle cramps and muscle ache.   Allergic/Immunologic: Negative for itching and sneezing.   Neurological: Negative for headaches, numbness and tingling.        Objective:      Physical Exam  Vitals signs and nursing note reviewed.   Constitutional:       Appearance: Normal appearance. She is well-developed.   HENT:      Head: Normocephalic and atraumatic.      Nose: Nose normal.   Eyes:      Pupils: Pupils are equal, round, and reactive to light.   Neck:      Musculoskeletal: Normal range of motion and neck supple.   Cardiovascular:      Rate and Rhythm: Normal rate and regular rhythm.   Pulmonary:      Effort: Pulmonary effort is normal.      Breath sounds: Normal breath sounds.   Musculoskeletal:      Cervical back: She exhibits decreased range of motion, tenderness and pain. She exhibits no bony tenderness, no swelling, no edema, no deformity, no laceration, no spasm  and normal pulse.      Lumbar back: She exhibits decreased range of motion, tenderness, pain and spasm. She exhibits no bony tenderness, no swelling, no edema, no deformity, no laceration and normal pulse.        Back:       Comments: Pain persists with palpation of mid and  low back without spasm. Pain  with flexion to 90, extension to 25 and lateral bending to 25 degrees. No motor or sensory deficits.   Patient also has pain about thoracic spine with palpation and range of motion testing. Pain with flexion of neck to 25 degrees, extension to 10 degrees and later bending to 25 degrees.   Skin:     General: Skin is warm.   Neurological:      Mental Status: She is alert.      Comments: No focal neurologic deficits   Psychiatric:         Behavior: Behavior normal.         Thought Content: Thought content normal.         Judgment: Judgment normal.         Assessment:       1. Sprain of ligaments of lumbar spine, subsequent encounter    2. Chronic bilateral low back pain without sciatica    3. Chronic cervical pain    4. Degeneration of lumbar intervertebral disc    5. Spondylosis of thoracic region without myelopathy or radiculopathy        Plan:     patient will be using ibuprofen 800 mg as needed during the day and Norflex 100mg  at night.  She will continue with daily warm soaks with exercises prescribed from the office.    Medications Ordered This Encounter   Medications    HYDROcodone-acetaminophen (NORCO)  mg per tablet     Sig: Take 1 tablet by mouth every 6 (six) hours as needed for Pain.     Dispense:  90 tablet     Refill:  0     Quantity prescribed more than 7 day supply? Yes, quantity medically necessary    lidocaine (LIDODERM) 5 %     Sig: Place 1 patch onto the skin once daily. Remove & Discard patch within 12 hours or as directed by MD     Dispense:  30 patch     Refill:  2     Patient Instructions: Daily home exercises/warm soaks   Restrictions: Disabled until next office visit  Follow up in  about 5 weeks (around 10/20/2020).

## 2020-10-20 ENCOUNTER — OFFICE VISIT (OUTPATIENT)
Dept: URGENT CARE | Facility: CLINIC | Age: 70
End: 2020-10-20
Payer: OTHER GOVERNMENT

## 2020-10-20 DIAGNOSIS — M51.36 DEGENERATION OF LUMBAR INTERVERTEBRAL DISC: ICD-10-CM

## 2020-10-20 DIAGNOSIS — M47.814 SPONDYLOSIS OF THORACIC REGION WITHOUT MYELOPATHY OR RADICULOPATHY: ICD-10-CM

## 2020-10-20 DIAGNOSIS — M54.50 CHRONIC BILATERAL LOW BACK PAIN WITHOUT SCIATICA: ICD-10-CM

## 2020-10-20 DIAGNOSIS — G89.29 CHRONIC BILATERAL LOW BACK PAIN WITHOUT SCIATICA: ICD-10-CM

## 2020-10-20 DIAGNOSIS — M54.2 CHRONIC CERVICAL PAIN: ICD-10-CM

## 2020-10-20 DIAGNOSIS — S33.5XXD SPRAIN OF LIGAMENTS OF LUMBAR SPINE, SUBSEQUENT ENCOUNTER: ICD-10-CM

## 2020-10-20 DIAGNOSIS — G89.29 CHRONIC CERVICAL PAIN: ICD-10-CM

## 2020-10-20 PROCEDURE — 99214 PR OFFICE/OUTPT VISIT, EST, LEVL IV, 30-39 MIN: ICD-10-PCS | Mod: S$GLB,,, | Performed by: PREVENTIVE MEDICINE

## 2020-10-20 PROCEDURE — 99214 OFFICE O/P EST MOD 30 MIN: CPT | Mod: S$GLB,,, | Performed by: PREVENTIVE MEDICINE

## 2020-10-20 RX ORDER — IBUPROFEN 800 MG/1
800 TABLET ORAL 3 TIMES DAILY
Qty: 60 TABLET | Refills: 1 | Status: SHIPPED | OUTPATIENT
Start: 2020-10-20 | End: 2021-01-14 | Stop reason: SDUPTHER

## 2020-10-20 RX ORDER — HYDROCODONE BITARTRATE AND ACETAMINOPHEN 10; 325 MG/1; MG/1
1 TABLET ORAL EVERY 6 HOURS PRN
Qty: 90 TABLET | Refills: 0 | Status: SHIPPED | OUTPATIENT
Start: 2020-10-20 | End: 2020-11-19 | Stop reason: SDUPTHER

## 2020-10-20 RX ORDER — ORPHENADRINE CITRATE 100 MG/1
100 TABLET, EXTENDED RELEASE ORAL 2 TIMES DAILY
Qty: 60 TABLET | Refills: 1 | Status: SHIPPED | OUTPATIENT
Start: 2020-10-20 | End: 2020-11-19 | Stop reason: SDUPTHER

## 2020-10-20 RX ORDER — LIDOCAINE 50 MG/G
1 PATCH TOPICAL DAILY
Qty: 30 PATCH | Refills: 2 | Status: SHIPPED | OUTPATIENT
Start: 2020-10-20 | End: 2021-01-14 | Stop reason: SDUPTHER

## 2020-10-20 NOTE — LETTER
[facility] - [address] [place] [address]  Phone: [phone]  Fax: [phone]  [facility]: [id number]    Pt Name: Sasha Michelle  Injury Date: 03/15/2001   Employee ID: [id number] Date of Treatment: 10/20/2020   Company: [employer]      Appointment Time: 01:45 PM Arrived:    Provider: Miguel Staples MD Time Out:     Office Treatment:   1. Sprain of ligaments of lumbar spine, subsequent encounter    2. Chronic bilateral low back pain without sciatica    3. Chronic cervical pain    4. Degeneration of lumbar intervertebral disc    5. Spondylosis of thoracic region without myelopathy or radiculopathy      Medications Ordered This Encounter   Medications    HYDROcodone-acetaminophen (NORCO)  mg per tablet    ibuprofen (ADVIL,MOTRIN) 800 MG tablet    lidocaine (LIDODERM) 5 %    orphenadrine (NORFLEX) 100 mg tablet      Patient Instructions: Daily home exercises/warm soaks    Restrictions: Disabled until next office visit     Next appt: 11/30/2020

## 2020-10-20 NOTE — PROGRESS NOTES
Subjective:       Patient ID: Sasha Michelle is a 69 y.o. female.    Chief Complaint:  Chronic back pain  Patient states that she still has intermittent burning sensation with throbbing in her lower back often radiating to her mid thoracic region pain level has been fluctuating from a 3 4/10 to a maximum of 7/10.  Patient has increasing pain with any significant physical activity as well as prolonged sitting or standing.  She has no history of numbness tingling or weakness in her lower extremities.      Constitution: Negative.   HENT: Negative.    Neck: negative.   Cardiovascular: Negative.    Eyes: Negative.    Respiratory: Negative.    Gastrointestinal: Negative.    Endocrine: negative.   Genitourinary: Negative.    Musculoskeletal: Positive for pain, joint pain, arthritis and back pain. Negative for trauma, joint swelling, abnormal ROM of joint, gout, muscle cramps and muscle ache.   Skin: Negative.    Allergic/Immunologic: Negative.    Hematologic/Lymphatic: Negative.         Objective:      Physical Exam  Vitals signs and nursing note reviewed.   Constitutional:       Appearance: Normal appearance. She is well-developed.   HENT:      Head: Normocephalic and atraumatic.      Nose: Nose normal.   Eyes:      Pupils: Pupils are equal, round, and reactive to light.   Neck:      Musculoskeletal: Normal range of motion and neck supple.   Cardiovascular:      Rate and Rhythm: Normal rate and regular rhythm.   Pulmonary:      Effort: Pulmonary effort is normal.      Breath sounds: Normal breath sounds.   Musculoskeletal:      Cervical back: She exhibits decreased range of motion, tenderness and pain. She exhibits no bony tenderness, no swelling, no edema, no deformity, no laceration, no spasm and normal pulse.      Lumbar back: She exhibits decreased range of motion, tenderness, pain and spasm. She exhibits no bony tenderness, no swelling, no edema, no deformity, no laceration and normal pulse.        Back:        Comments: Pain persists with palpation of mid and  low back without spasm. Pain  with flexion to 90, extension to 25 and lateral bending to 25 degrees. No motor or sensory deficits.   Patient also has pain about thoracic spine with palpation and range of motion testing. Pain with flexion of neck to 25 degrees, extension to 10 degrees and later bending to 25 degrees.   Skin:     General: Skin is warm.   Neurological:      Mental Status: She is alert.      Comments: No focal neurologic deficits   Psychiatric:         Behavior: Behavior normal.         Thought Content: Thought content normal.         Judgment: Judgment normal.         Assessment:       1. Sprain of ligaments of lumbar spine, subsequent encounter    2. Chronic bilateral low back pain without sciatica    3. Chronic cervical pain    4. Degeneration of lumbar intervertebral disc    5. Spondylosis of thoracic region without myelopathy or radiculopathy        Plan:         Medications Ordered This Encounter   Medications    HYDROcodone-acetaminophen (NORCO)  mg per tablet     Sig: Take 1 tablet by mouth every 6 (six) hours as needed for Pain.     Dispense:  90 tablet     Refill:  0     Quantity prescribed more than 7 day supply? Yes, quantity medically necessary    ibuprofen (ADVIL,MOTRIN) 800 MG tablet     Sig: Take 1 tablet (800 mg total) by mouth 3 (three) times daily.     Dispense:  60 tablet     Refill:  1    lidocaine (LIDODERM) 5 %     Sig: Place 1 patch onto the skin once daily. Remove & Discard patch within 12 hours or as directed by MD     Dispense:  30 patch     Refill:  2    orphenadrine (NORFLEX) 100 mg tablet     Sig: Take 1 tablet (100 mg total) by mouth 2 (two) times daily.     Dispense:  60 tablet     Refill:  1     Patient Instructions: Daily home exercises/warm soaks   Restrictions: Disabled until next office visit  Follow up in about 6 weeks (around 12/1/2020).

## 2020-11-19 ENCOUNTER — OFFICE VISIT (OUTPATIENT)
Dept: URGENT CARE | Facility: CLINIC | Age: 70
End: 2020-11-19
Payer: OTHER GOVERNMENT

## 2020-11-19 DIAGNOSIS — G89.29 CHRONIC BILATERAL LOW BACK PAIN WITHOUT SCIATICA: ICD-10-CM

## 2020-11-19 DIAGNOSIS — G89.29 CHRONIC CERVICAL PAIN: ICD-10-CM

## 2020-11-19 DIAGNOSIS — S33.5XXD SPRAIN OF LIGAMENTS OF LUMBAR SPINE, SUBSEQUENT ENCOUNTER: Primary | ICD-10-CM

## 2020-11-19 DIAGNOSIS — M54.2 CHRONIC CERVICAL PAIN: ICD-10-CM

## 2020-11-19 DIAGNOSIS — M47.814 SPONDYLOSIS OF THORACIC REGION WITHOUT MYELOPATHY OR RADICULOPATHY: ICD-10-CM

## 2020-11-19 DIAGNOSIS — M54.50 CHRONIC BILATERAL LOW BACK PAIN WITHOUT SCIATICA: ICD-10-CM

## 2020-11-19 DIAGNOSIS — M51.36 DEGENERATION OF LUMBAR INTERVERTEBRAL DISC: ICD-10-CM

## 2020-11-19 PROCEDURE — 99214 PR OFFICE/OUTPT VISIT, EST, LEVL IV, 30-39 MIN: ICD-10-PCS | Mod: S$GLB,,, | Performed by: PREVENTIVE MEDICINE

## 2020-11-19 PROCEDURE — 99214 OFFICE O/P EST MOD 30 MIN: CPT | Mod: S$GLB,,, | Performed by: PREVENTIVE MEDICINE

## 2020-11-19 RX ORDER — ORPHENADRINE CITRATE 100 MG/1
100 TABLET, EXTENDED RELEASE ORAL 2 TIMES DAILY
Qty: 60 TABLET | Refills: 1 | Status: SHIPPED | OUTPATIENT
Start: 2020-11-19 | End: 2020-12-17 | Stop reason: ALTCHOICE

## 2020-11-19 RX ORDER — HYDROCODONE BITARTRATE AND ACETAMINOPHEN 10; 325 MG/1; MG/1
1 TABLET ORAL EVERY 6 HOURS PRN
Qty: 90 TABLET | Refills: 0 | Status: SHIPPED | OUTPATIENT
Start: 2020-11-19 | End: 2020-12-17 | Stop reason: SDUPTHER

## 2020-11-19 NOTE — LETTER
[facility] [address] [place] [address]  Phone: [phone]  Fax: [phone]  [facility]: [id number]    Pt Name: Sasha Michelle  Injury Date: 03/15/2001   Employee ID: [id number] Date of  Treatment: 11/19/2020   Company: [employer]      Appointment Time: 08:45 AM Arrived: 8:57 AM   Provider: Miguel Staples MD Time Out: 9:35 AM     Office Treatment:     1. Sprain of ligaments of lumbar spine, subsequent encounter    2. Chronic bilateral low back pain without sciatica    3. Chronic cervical pain    4. Degeneration of lumbar intervertebral disc    5. Spondylosis of thoracic region without myelopathy or radiculopathy      Medications Ordered This Encounter   Medications    HYDROcodone-acetaminophen (NORCO)  mg per tablet    orphenadrine (NORFLEX) 100 mg tablet      Patient Instructions: Daily home exercises/warm soaks    Restrictions: Disabled until next office visit     Return Appointment: 12/17/2020 at 10:00 AM  GELACIO

## 2020-11-19 NOTE — PROGRESS NOTES
Subjective:       Patient ID: Sasha Michelle is a 69 y.o. female.    Chief Complaint: Back Pain    RV- Pt is being seen today for her - Lower back injury - Patient states she still feels a burning and throbbing pain in her lower back. She also states she needs a refill on her medication. Pain level 5/10 on today. IJ    Back Pain  This is a recurrent problem. The current episode started more than 1 month ago. The problem occurs constantly. The problem is unchanged. The pain is present in the lumbar spine. The quality of the pain is described as burning (throbbing). The pain does not radiate. The pain is at a severity of 5/10. The pain is moderate. The pain is the same all the time. The symptoms are aggravated by sitting, position, lying down and bending. Pertinent negatives include no headaches, leg pain, numbness or tingling. She has tried muscle relaxant for the symptoms.       Constitution: Negative.   HENT: Negative.    Neck: negative.   Cardiovascular: Negative.    Eyes: Negative.    Respiratory: Negative.    Gastrointestinal: Negative.    Endocrine: negative.   Genitourinary: Negative.    Musculoskeletal: Positive for pain, joint pain, arthritis and back pain. Negative for trauma, joint swelling, abnormal ROM of joint, gout, muscle cramps and muscle ache.   Skin: Negative.    Allergic/Immunologic: Negative.    Neurological: Negative for headaches and numbness.   Hematologic/Lymphatic: Negative.         Objective:      Physical Exam  Vitals signs and nursing note reviewed.   Constitutional:       Appearance: Normal appearance. She is well-developed.   HENT:      Head: Normocephalic and atraumatic.      Nose: Nose normal.   Eyes:      Pupils: Pupils are equal, round, and reactive to light.   Neck:      Musculoskeletal: Normal range of motion and neck supple.   Cardiovascular:      Rate and Rhythm: Normal rate and regular rhythm.   Pulmonary:      Effort: Pulmonary effort is normal.      Breath sounds: Normal  breath sounds.   Musculoskeletal:      Cervical back: She exhibits decreased range of motion, tenderness and pain. She exhibits no bony tenderness, no swelling, no edema, no deformity, no laceration, no spasm and normal pulse.      Lumbar back: She exhibits decreased range of motion, tenderness, pain and spasm. She exhibits no bony tenderness, no swelling, no edema, no deformity, no laceration and normal pulse.        Back:       Comments: Pain persists with palpation of mid and  low back without spasm. Pain  with flexion to 90, extension to 25 and lateral bending to 25 degrees. No motor or sensory deficits.   Patient also has pain about thoracic spine with palpation and range of motion testing. Pain with flexion of neck to 25 degrees, extension to 10 degrees and later bending to 25 degrees.   Skin:     General: Skin is warm.   Neurological:      Mental Status: She is alert.      Comments: No focal neurologic deficits   Psychiatric:         Behavior: Behavior normal.         Thought Content: Thought content normal.         Judgment: Judgment normal.         Assessment:       1. Sprain of ligaments of lumbar spine, subsequent encounter    2. Chronic bilateral low back pain without sciatica    3. Chronic cervical pain    4. Degeneration of lumbar intervertebral disc    5. Spondylosis of thoracic region without myelopathy or radiculopathy        Plan:     patient will be continuing her warm soaks with exercises demonstrated in the office.  She will also continue taking ibuprofen 800 mg 3 times a day with food and apply lidocaine 5% patch as needed to her lower back and thoracic spine.    Medications Ordered This Encounter   Medications    HYDROcodone-acetaminophen (NORCO)  mg per tablet     Sig: Take 1 tablet by mouth every 6 (six) hours as needed for Pain.     Dispense:  90 tablet     Refill:  0     Quantity prescribed more than 7 day supply? Yes, quantity medically necessary    orphenadrine (NORFLEX) 100 mg  tablet     Sig: Take 1 tablet (100 mg total) by mouth 2 (two) times daily.     Dispense:  60 tablet     Refill:  1     Patient Instructions: Daily home exercises/warm soaks   Restrictions: Disabled until next office visit  Follow up in about 4 weeks (around 12/17/2020).

## 2020-12-17 ENCOUNTER — OFFICE VISIT (OUTPATIENT)
Dept: URGENT CARE | Facility: CLINIC | Age: 70
End: 2020-12-17
Payer: OTHER GOVERNMENT

## 2020-12-17 DIAGNOSIS — S33.5XXD SPRAIN OF LIGAMENTS OF LUMBAR SPINE, SUBSEQUENT ENCOUNTER: Primary | ICD-10-CM

## 2020-12-17 DIAGNOSIS — M51.36 DEGENERATION OF LUMBAR INTERVERTEBRAL DISC: ICD-10-CM

## 2020-12-17 DIAGNOSIS — M47.814 SPONDYLOSIS OF THORACIC REGION WITHOUT MYELOPATHY OR RADICULOPATHY: ICD-10-CM

## 2020-12-17 DIAGNOSIS — M54.2 CHRONIC CERVICAL PAIN: ICD-10-CM

## 2020-12-17 DIAGNOSIS — M54.50 CHRONIC BILATERAL LOW BACK PAIN WITHOUT SCIATICA: ICD-10-CM

## 2020-12-17 DIAGNOSIS — G89.29 CHRONIC CERVICAL PAIN: ICD-10-CM

## 2020-12-17 DIAGNOSIS — G89.29 CHRONIC BILATERAL LOW BACK PAIN WITHOUT SCIATICA: ICD-10-CM

## 2020-12-17 PROCEDURE — 99214 OFFICE O/P EST MOD 30 MIN: CPT | Mod: S$GLB,,, | Performed by: PREVENTIVE MEDICINE

## 2020-12-17 PROCEDURE — 99214 PR OFFICE/OUTPT VISIT, EST, LEVL IV, 30-39 MIN: ICD-10-PCS | Mod: S$GLB,,, | Performed by: PREVENTIVE MEDICINE

## 2020-12-17 RX ORDER — METHOCARBAMOL 500 MG/1
500 TABLET, FILM COATED ORAL NIGHTLY
Qty: 40 TABLET | Refills: 1 | Status: SHIPPED | OUTPATIENT
Start: 2020-12-17 | End: 2021-01-14 | Stop reason: SDUPTHER

## 2020-12-17 RX ORDER — HYDROCODONE BITARTRATE AND ACETAMINOPHEN 10; 325 MG/1; MG/1
1 TABLET ORAL EVERY 6 HOURS PRN
Qty: 90 TABLET | Refills: 0 | Status: SHIPPED | OUTPATIENT
Start: 2020-12-17 | End: 2021-01-14 | Stop reason: SDUPTHER

## 2020-12-17 NOTE — LETTER
[facility] [address] [address]  Phone: [phone]  Fax: [phone]  [facility]: [id number]    Pt Name: Sasha Michelle  Injury Date: 03/15/2001   Employee ID: [id number] Date of Treatment: 12/17/2020   Company: [employer]      Appointment Time: 09:45 AM Arrived: 9:20 AM   Provider: Miguel Staples MD Time Out: 10:35 AM     Office Treatment:   1. Sprain of ligaments of lumbar spine, subsequent encounter    2. Chronic bilateral low back pain without sciatica    3. Chronic cervical pain    4. Degeneration of lumbar intervertebral disc    5. Spondylosis of thoracic region without myelopathy or radiculopathy      Medications Ordered This Encounter   Medications    HYDROcodone-acetaminophen (NORCO)  mg per tablet    methocarbamoL (ROBAXIN) 500 MG Tab      Patient Instructions: Daily home exercises/warm soaks    Restrictions: Disabled until next office visit     Return Appointment: 01/14/2021 at 9:35 AM  GELACIO

## 2020-12-17 NOTE — PROGRESS NOTES
Subjective:       Patient ID: Sasha Michelle is a 69 y.o. female.    Chief Complaint: Back Pain    RV- Pt is being seen today for her - Lower back injury - Patient states she still feels a burning and throbbing pain in her lower back. She also states she needs a refill on her medication. Pain level 5/10 on today. IJ    Back Pain  This is a recurrent problem. The current episode started more than 1 month ago. The problem occurs constantly. The problem is unchanged. The pain is present in the lumbar spine. The quality of the pain is described as burning (throbbing). The pain does not radiate. The pain is at a severity of 5/10. The pain is moderate. The pain is the same all the time. The symptoms are aggravated by sitting, position, lying down and bending. Pertinent negatives include no headaches, leg pain, numbness or tingling. She has tried muscle relaxant for the symptoms.       Constitution: Negative.   HENT: Negative.    Neck: negative.   Cardiovascular: Negative.    Eyes: Negative.    Respiratory: Negative.    Gastrointestinal: Negative.    Endocrine: negative.   Genitourinary: Negative.    Musculoskeletal: Positive for pain, joint pain, arthritis and back pain. Negative for trauma, joint swelling, abnormal ROM of joint, gout, muscle cramps and muscle ache.   Skin: Negative.    Allergic/Immunologic: Negative.    Neurological: Negative for headaches and numbness.   Hematologic/Lymphatic: Negative.         Objective:      Physical Exam  Vitals signs and nursing note reviewed.   Constitutional:       Appearance: Normal appearance. She is well-developed.   HENT:      Head: Normocephalic and atraumatic.      Nose: Nose normal.   Eyes:      Pupils: Pupils are equal, round, and reactive to light.   Neck:      Musculoskeletal: Normal range of motion and neck supple.   Cardiovascular:      Rate and Rhythm: Normal rate and regular rhythm.   Pulmonary:      Effort: Pulmonary effort is normal.      Breath sounds: Normal  breath sounds.   Musculoskeletal:      Cervical back: She exhibits decreased range of motion, tenderness and pain. She exhibits no bony tenderness, no swelling, no edema, no deformity, no laceration, no spasm and normal pulse.      Lumbar back: She exhibits decreased range of motion, tenderness, pain and spasm. She exhibits no bony tenderness, no swelling, no edema, no deformity, no laceration and normal pulse.        Back:       Comments: Pain persists with palpation of mid and  low back without spasm. Pain  with flexion to 90, extension to 25 and lateral bending to 25 degrees. No motor or sensory deficits.   Patient also has pain about thoracic spine with palpation and range of motion testing. Pain with flexion of neck to 25 degrees, extension to 10 degrees and later bending to 25 degrees.   Skin:     General: Skin is warm.   Neurological:      Mental Status: She is alert.      Comments: No focal neurologic deficits   Psychiatric:         Behavior: Behavior normal.         Thought Content: Thought content normal.         Judgment: Judgment normal.         Assessment:       1. Sprain of ligaments of lumbar spine, subsequent encounter    2. Chronic bilateral low back pain without sciatica    3. Chronic cervical pain    4. Degeneration of lumbar intervertebral disc    5. Spondylosis of thoracic region without myelopathy or radiculopathy        Plan:       Patient will continue taking ibuprofen 800 mg as needed and using lidocaine patches previously prescribed for back pain.  Medications Ordered This Encounter   Medications    HYDROcodone-acetaminophen (NORCO)  mg per tablet     Sig: Take 1 tablet by mouth every 6 (six) hours as needed for Pain.     Dispense:  90 tablet     Refill:  0     Quantity prescribed more than 7 day supply? Yes, quantity medically necessary    methocarbamoL (ROBAXIN) 500 MG Tab     Sig: Take 1 tablet (500 mg total) by mouth nightly.     Dispense:  40 tablet     Refill:  1     Patient  Instructions: Daily home exercises/warm soaks   Restrictions: Disabled until next office visit  Follow up in about 4 weeks (around 1/14/2021).

## 2021-01-05 ENCOUNTER — OFFICE VISIT (OUTPATIENT)
Dept: URGENT CARE | Facility: CLINIC | Age: 71
End: 2021-01-05
Payer: MEDICARE

## 2021-01-05 VITALS
DIASTOLIC BLOOD PRESSURE: 70 MMHG | OXYGEN SATURATION: 97 % | SYSTOLIC BLOOD PRESSURE: 152 MMHG | HEART RATE: 56 BPM | TEMPERATURE: 99 F | RESPIRATION RATE: 16 BRPM | WEIGHT: 148 LBS | BODY MASS INDEX: 27.23 KG/M2 | HEIGHT: 62 IN

## 2021-01-05 DIAGNOSIS — U07.1 COVID-19: Primary | ICD-10-CM

## 2021-01-05 DIAGNOSIS — R05.9 COUGH: ICD-10-CM

## 2021-01-05 LAB
CTP QC/QA: YES
SARS-COV-2 RDRP RESP QL NAA+PROBE: POSITIVE

## 2021-01-05 PROCEDURE — U0002 COVID-19 LAB TEST NON-CDC: HCPCS | Mod: QW,CR,S$GLB, | Performed by: PHYSICIAN ASSISTANT

## 2021-01-05 PROCEDURE — U0002: ICD-10-PCS | Mod: QW,CR,S$GLB, | Performed by: PHYSICIAN ASSISTANT

## 2021-01-05 PROCEDURE — 99214 OFFICE O/P EST MOD 30 MIN: CPT | Mod: CR,S$GLB,, | Performed by: PHYSICIAN ASSISTANT

## 2021-01-05 PROCEDURE — 99214 PR OFFICE/OUTPT VISIT, EST, LEVL IV, 30-39 MIN: ICD-10-PCS | Mod: CR,S$GLB,, | Performed by: PHYSICIAN ASSISTANT

## 2021-01-05 RX ORDER — OMEPRAZOLE 40 MG/1
CAPSULE, DELAYED RELEASE ORAL
COMMUNITY
Start: 2020-10-10

## 2021-01-05 RX ORDER — AMLODIPINE BESYLATE 5 MG/1
5 TABLET ORAL DAILY
COMMUNITY
Start: 2020-12-09

## 2021-01-08 ENCOUNTER — PATIENT MESSAGE (OUTPATIENT)
Dept: ADMINISTRATIVE | Facility: OTHER | Age: 71
End: 2021-01-08

## 2021-01-08 ENCOUNTER — NURSE TRIAGE (OUTPATIENT)
Dept: ADMINISTRATIVE | Facility: CLINIC | Age: 71
End: 2021-01-08

## 2021-01-11 ENCOUNTER — INFUSION (OUTPATIENT)
Dept: INFECTIOUS DISEASES | Facility: HOSPITAL | Age: 71
End: 2021-01-11
Attending: PHYSICIAN ASSISTANT
Payer: MEDICARE

## 2021-01-11 ENCOUNTER — TELEPHONE (OUTPATIENT)
Dept: ADMINISTRATIVE | Facility: CLINIC | Age: 71
End: 2021-01-11

## 2021-01-11 VITALS
RESPIRATION RATE: 18 BRPM | TEMPERATURE: 99 F | BODY MASS INDEX: 25.95 KG/M2 | HEIGHT: 62 IN | OXYGEN SATURATION: 99 % | HEART RATE: 62 BPM | WEIGHT: 141 LBS | SYSTOLIC BLOOD PRESSURE: 141 MMHG | DIASTOLIC BLOOD PRESSURE: 71 MMHG

## 2021-01-11 DIAGNOSIS — U07.1 COVID-19: ICD-10-CM

## 2021-01-11 PROCEDURE — 25000003 PHARM REV CODE 250: Performed by: INTERNAL MEDICINE

## 2021-01-11 PROCEDURE — 63600175 PHARM REV CODE 636 W HCPCS: Performed by: INTERNAL MEDICINE

## 2021-01-11 PROCEDURE — M0239 BAMLANIVIMAB-XXXX INFUSION: HCPCS | Performed by: INTERNAL MEDICINE

## 2021-01-11 RX ORDER — SODIUM CHLORIDE 0.9 % (FLUSH) 0.9 %
10 SYRINGE (ML) INJECTION
Status: ACTIVE | OUTPATIENT
Start: 2021-01-11

## 2021-01-11 RX ORDER — EPINEPHRINE 0.1 MG/ML
0.3 INJECTION INTRAVENOUS
Status: ACTIVE | OUTPATIENT
Start: 2021-01-11

## 2021-01-11 RX ORDER — ONDANSETRON 4 MG/1
4 TABLET, ORALLY DISINTEGRATING ORAL ONCE AS NEEDED
Status: ACTIVE | OUTPATIENT
Start: 2021-01-11 | End: 2032-06-09

## 2021-01-11 RX ORDER — DIPHENHYDRAMINE HYDROCHLORIDE 50 MG/ML
25 INJECTION INTRAMUSCULAR; INTRAVENOUS ONCE AS NEEDED
Status: ACTIVE | OUTPATIENT
Start: 2021-01-11 | End: 2032-06-09

## 2021-01-11 RX ORDER — ALBUTEROL SULFATE 90 UG/1
2 AEROSOL, METERED RESPIRATORY (INHALATION)
Status: ACTIVE | OUTPATIENT
Start: 2021-01-11

## 2021-01-11 RX ORDER — ACETAMINOPHEN 325 MG/1
650 TABLET ORAL ONCE AS NEEDED
Status: ACTIVE | OUTPATIENT
Start: 2021-01-11 | End: 2032-06-09

## 2021-01-11 RX ADMIN — SODIUM CHLORIDE 700 MG: 0.9 INJECTION, SOLUTION INTRAVENOUS at 11:01

## 2021-01-12 ENCOUNTER — NURSE TRIAGE (OUTPATIENT)
Dept: ADMINISTRATIVE | Facility: CLINIC | Age: 71
End: 2021-01-12

## 2021-01-13 ENCOUNTER — NURSE TRIAGE (OUTPATIENT)
Dept: ADMINISTRATIVE | Facility: CLINIC | Age: 71
End: 2021-01-13

## 2021-01-14 ENCOUNTER — OFFICE VISIT (OUTPATIENT)
Dept: URGENT CARE | Facility: CLINIC | Age: 71
End: 2021-01-14
Payer: OTHER GOVERNMENT

## 2021-01-14 DIAGNOSIS — S33.5XXD SPRAIN OF LIGAMENTS OF LUMBAR SPINE, SUBSEQUENT ENCOUNTER: ICD-10-CM

## 2021-01-14 DIAGNOSIS — M47.814 SPONDYLOSIS OF THORACIC REGION WITHOUT MYELOPATHY OR RADICULOPATHY: ICD-10-CM

## 2021-01-14 DIAGNOSIS — M51.36 DEGENERATION OF LUMBAR INTERVERTEBRAL DISC: ICD-10-CM

## 2021-01-14 DIAGNOSIS — G89.29 CHRONIC CERVICAL PAIN: ICD-10-CM

## 2021-01-14 DIAGNOSIS — G89.29 CHRONIC BILATERAL LOW BACK PAIN WITHOUT SCIATICA: Primary | ICD-10-CM

## 2021-01-14 DIAGNOSIS — M54.50 CHRONIC BILATERAL LOW BACK PAIN WITHOUT SCIATICA: Primary | ICD-10-CM

## 2021-01-14 DIAGNOSIS — M54.2 CHRONIC CERVICAL PAIN: ICD-10-CM

## 2021-01-14 PROCEDURE — 99212 PR OFFICE/OUTPT VISIT, EST, LEVL II, 10-19 MIN: ICD-10-PCS | Mod: S$GLB,,, | Performed by: PREVENTIVE MEDICINE

## 2021-01-14 PROCEDURE — 99212 OFFICE O/P EST SF 10 MIN: CPT | Mod: S$GLB,,, | Performed by: PREVENTIVE MEDICINE

## 2021-01-14 RX ORDER — LIDOCAINE 50 MG/G
1 PATCH TOPICAL DAILY
Qty: 30 PATCH | Refills: 2 | Status: SHIPPED | OUTPATIENT
Start: 2021-01-14 | End: 2021-02-11 | Stop reason: SDUPTHER

## 2021-01-14 RX ORDER — HYDROCODONE BITARTRATE AND ACETAMINOPHEN 10; 325 MG/1; MG/1
1 TABLET ORAL EVERY 6 HOURS PRN
Qty: 90 TABLET | Refills: 0 | Status: SHIPPED | OUTPATIENT
Start: 2021-01-14 | End: 2021-02-11 | Stop reason: SDUPTHER

## 2021-01-14 RX ORDER — METHOCARBAMOL 500 MG/1
500 TABLET, FILM COATED ORAL NIGHTLY
Qty: 40 TABLET | Refills: 1 | Status: SHIPPED | OUTPATIENT
Start: 2021-01-14 | End: 2021-09-16 | Stop reason: SDUPTHER

## 2021-01-14 RX ORDER — IBUPROFEN 800 MG/1
800 TABLET ORAL 3 TIMES DAILY
Qty: 60 TABLET | Refills: 1 | Status: SHIPPED | OUTPATIENT
Start: 2021-01-14 | End: 2021-02-11 | Stop reason: SDUPTHER

## 2021-02-11 ENCOUNTER — OFFICE VISIT (OUTPATIENT)
Dept: URGENT CARE | Facility: CLINIC | Age: 71
End: 2021-02-11
Payer: OTHER GOVERNMENT

## 2021-02-11 DIAGNOSIS — S33.5XXD SPRAIN OF LIGAMENTS OF LUMBAR SPINE, SUBSEQUENT ENCOUNTER: ICD-10-CM

## 2021-02-11 DIAGNOSIS — M54.2 CHRONIC CERVICAL PAIN: ICD-10-CM

## 2021-02-11 DIAGNOSIS — G89.29 CHRONIC BILATERAL LOW BACK PAIN WITHOUT SCIATICA: Primary | ICD-10-CM

## 2021-02-11 DIAGNOSIS — M51.36 DEGENERATION OF LUMBAR INTERVERTEBRAL DISC: ICD-10-CM

## 2021-02-11 DIAGNOSIS — G89.29 CHRONIC CERVICAL PAIN: ICD-10-CM

## 2021-02-11 DIAGNOSIS — M47.814 SPONDYLOSIS OF THORACIC REGION WITHOUT MYELOPATHY OR RADICULOPATHY: ICD-10-CM

## 2021-02-11 DIAGNOSIS — M54.50 CHRONIC BILATERAL LOW BACK PAIN WITHOUT SCIATICA: Primary | ICD-10-CM

## 2021-02-11 PROCEDURE — 99214 PR OFFICE/OUTPT VISIT, EST, LEVL IV, 30-39 MIN: ICD-10-PCS | Mod: S$GLB,,, | Performed by: PREVENTIVE MEDICINE

## 2021-02-11 PROCEDURE — 99214 OFFICE O/P EST MOD 30 MIN: CPT | Mod: S$GLB,,, | Performed by: PREVENTIVE MEDICINE

## 2021-02-11 RX ORDER — LIDOCAINE 50 MG/G
1 PATCH TOPICAL DAILY
Qty: 30 PATCH | Refills: 2 | Status: SHIPPED | OUTPATIENT
Start: 2021-02-11 | End: 2021-04-15 | Stop reason: SDUPTHER

## 2021-02-11 RX ORDER — IBUPROFEN 800 MG/1
800 TABLET ORAL 3 TIMES DAILY
Qty: 60 TABLET | Refills: 1 | Status: SHIPPED | OUTPATIENT
Start: 2021-02-11 | End: 2021-04-15 | Stop reason: SDUPTHER

## 2021-02-11 RX ORDER — HYDROCODONE BITARTRATE AND ACETAMINOPHEN 10; 325 MG/1; MG/1
1 TABLET ORAL EVERY 6 HOURS PRN
Qty: 90 TABLET | Refills: 0 | Status: SHIPPED | OUTPATIENT
Start: 2021-02-11 | End: 2021-03-18 | Stop reason: SDUPTHER

## 2021-03-18 ENCOUNTER — OFFICE VISIT (OUTPATIENT)
Dept: URGENT CARE | Facility: CLINIC | Age: 71
End: 2021-03-18
Payer: OTHER GOVERNMENT

## 2021-03-18 DIAGNOSIS — S33.5XXD SPRAIN OF LIGAMENTS OF LUMBAR SPINE, SUBSEQUENT ENCOUNTER: ICD-10-CM

## 2021-03-18 DIAGNOSIS — M47.814 SPONDYLOSIS OF THORACIC REGION WITHOUT MYELOPATHY OR RADICULOPATHY: ICD-10-CM

## 2021-03-18 DIAGNOSIS — M54.50 CHRONIC BILATERAL LOW BACK PAIN WITHOUT SCIATICA: Primary | ICD-10-CM

## 2021-03-18 DIAGNOSIS — G89.29 CHRONIC CERVICAL PAIN: ICD-10-CM

## 2021-03-18 DIAGNOSIS — M51.36 DEGENERATION OF LUMBAR INTERVERTEBRAL DISC: ICD-10-CM

## 2021-03-18 DIAGNOSIS — G89.29 CHRONIC BILATERAL LOW BACK PAIN WITHOUT SCIATICA: Primary | ICD-10-CM

## 2021-03-18 DIAGNOSIS — M54.2 CHRONIC CERVICAL PAIN: ICD-10-CM

## 2021-03-18 PROCEDURE — 99214 OFFICE O/P EST MOD 30 MIN: CPT | Mod: S$GLB,,, | Performed by: PREVENTIVE MEDICINE

## 2021-03-18 PROCEDURE — 99214 PR OFFICE/OUTPT VISIT, EST, LEVL IV, 30-39 MIN: ICD-10-PCS | Mod: S$GLB,,, | Performed by: PREVENTIVE MEDICINE

## 2021-03-18 RX ORDER — HYDROCODONE BITARTRATE AND ACETAMINOPHEN 10; 325 MG/1; MG/1
1 TABLET ORAL EVERY 6 HOURS PRN
Qty: 90 TABLET | Refills: 0 | Status: SHIPPED | OUTPATIENT
Start: 2021-03-18 | End: 2021-04-15 | Stop reason: SDUPTHER

## 2021-04-15 ENCOUNTER — OFFICE VISIT (OUTPATIENT)
Dept: URGENT CARE | Facility: CLINIC | Age: 71
End: 2021-04-15
Payer: OTHER GOVERNMENT

## 2021-04-15 DIAGNOSIS — S33.5XXD SPRAIN OF LIGAMENTS OF LUMBAR SPINE, SUBSEQUENT ENCOUNTER: ICD-10-CM

## 2021-04-15 DIAGNOSIS — M54.50 CHRONIC BILATERAL LOW BACK PAIN WITHOUT SCIATICA: ICD-10-CM

## 2021-04-15 DIAGNOSIS — M47.814 SPONDYLOSIS OF THORACIC REGION WITHOUT MYELOPATHY OR RADICULOPATHY: ICD-10-CM

## 2021-04-15 DIAGNOSIS — M54.2 CHRONIC CERVICAL PAIN: ICD-10-CM

## 2021-04-15 DIAGNOSIS — G89.29 CHRONIC CERVICAL PAIN: ICD-10-CM

## 2021-04-15 DIAGNOSIS — G89.29 CHRONIC BILATERAL LOW BACK PAIN WITHOUT SCIATICA: ICD-10-CM

## 2021-04-15 DIAGNOSIS — M51.36 DEGENERATION OF LUMBAR INTERVERTEBRAL DISC: ICD-10-CM

## 2021-04-15 PROCEDURE — 99214 OFFICE O/P EST MOD 30 MIN: CPT | Mod: S$GLB,,, | Performed by: PREVENTIVE MEDICINE

## 2021-04-15 PROCEDURE — 99214 PR OFFICE/OUTPT VISIT, EST, LEVL IV, 30-39 MIN: ICD-10-PCS | Mod: S$GLB,,, | Performed by: PREVENTIVE MEDICINE

## 2021-04-15 RX ORDER — HYDROCODONE BITARTRATE AND ACETAMINOPHEN 10; 325 MG/1; MG/1
1 TABLET ORAL EVERY 6 HOURS PRN
Qty: 90 TABLET | Refills: 0 | Status: SHIPPED | OUTPATIENT
Start: 2021-04-15 | End: 2021-05-13 | Stop reason: SDUPTHER

## 2021-04-15 RX ORDER — IBUPROFEN 800 MG/1
800 TABLET ORAL 3 TIMES DAILY
Qty: 60 TABLET | Refills: 1 | Status: SHIPPED | OUTPATIENT
Start: 2021-04-15 | End: 2021-05-13 | Stop reason: SDUPTHER

## 2021-04-15 RX ORDER — LIDOCAINE 50 MG/G
1 PATCH TOPICAL DAILY
Qty: 30 PATCH | Refills: 2 | Status: SHIPPED | OUTPATIENT
Start: 2021-04-15 | End: 2021-05-13 | Stop reason: SDUPTHER

## 2021-05-04 ENCOUNTER — PATIENT MESSAGE (OUTPATIENT)
Dept: RESEARCH | Facility: HOSPITAL | Age: 71
End: 2021-05-04

## 2021-05-10 ENCOUNTER — PATIENT MESSAGE (OUTPATIENT)
Dept: RESEARCH | Facility: HOSPITAL | Age: 71
End: 2021-05-10

## 2021-05-13 ENCOUNTER — OFFICE VISIT (OUTPATIENT)
Dept: URGENT CARE | Facility: CLINIC | Age: 71
End: 2021-05-13
Payer: OTHER GOVERNMENT

## 2021-05-13 DIAGNOSIS — S33.5XXD SPRAIN OF LIGAMENTS OF LUMBAR SPINE, SUBSEQUENT ENCOUNTER: ICD-10-CM

## 2021-05-13 DIAGNOSIS — M51.36 DEGENERATION OF LUMBAR INTERVERTEBRAL DISC: ICD-10-CM

## 2021-05-13 DIAGNOSIS — M47.814 SPONDYLOSIS OF THORACIC REGION WITHOUT MYELOPATHY OR RADICULOPATHY: ICD-10-CM

## 2021-05-13 DIAGNOSIS — M54.2 CHRONIC CERVICAL PAIN: ICD-10-CM

## 2021-05-13 DIAGNOSIS — G89.29 CHRONIC CERVICAL PAIN: ICD-10-CM

## 2021-05-13 DIAGNOSIS — M54.50 CHRONIC BILATERAL LOW BACK PAIN WITHOUT SCIATICA: Primary | ICD-10-CM

## 2021-05-13 DIAGNOSIS — G89.29 CHRONIC BILATERAL LOW BACK PAIN WITHOUT SCIATICA: Primary | ICD-10-CM

## 2021-05-13 PROCEDURE — 99214 PR OFFICE/OUTPT VISIT, EST, LEVL IV, 30-39 MIN: ICD-10-PCS | Mod: S$GLB,,, | Performed by: PREVENTIVE MEDICINE

## 2021-05-13 PROCEDURE — 99214 OFFICE O/P EST MOD 30 MIN: CPT | Mod: S$GLB,,, | Performed by: PREVENTIVE MEDICINE

## 2021-05-13 RX ORDER — HYDROCODONE BITARTRATE AND ACETAMINOPHEN 10; 325 MG/1; MG/1
1 TABLET ORAL EVERY 6 HOURS PRN
Qty: 90 TABLET | Refills: 0 | Status: SHIPPED | OUTPATIENT
Start: 2021-05-13 | End: 2021-06-17 | Stop reason: SDUPTHER

## 2021-05-13 RX ORDER — IBUPROFEN 800 MG/1
800 TABLET ORAL 3 TIMES DAILY
Qty: 60 TABLET | Refills: 1 | Status: SHIPPED | OUTPATIENT
Start: 2021-05-13 | End: 2021-07-15 | Stop reason: ALTCHOICE

## 2021-05-13 RX ORDER — LIDOCAINE 50 MG/G
1 PATCH TOPICAL DAILY
Qty: 30 PATCH | Refills: 2 | Status: SHIPPED | OUTPATIENT
Start: 2021-05-13 | End: 2021-07-15 | Stop reason: SDUPTHER

## 2021-06-17 ENCOUNTER — OFFICE VISIT (OUTPATIENT)
Dept: URGENT CARE | Facility: CLINIC | Age: 71
End: 2021-06-17
Payer: OTHER GOVERNMENT

## 2021-06-17 DIAGNOSIS — S33.5XXD SPRAIN OF LIGAMENTS OF LUMBAR SPINE, SUBSEQUENT ENCOUNTER: ICD-10-CM

## 2021-06-17 DIAGNOSIS — M51.36 DEGENERATION OF LUMBAR INTERVERTEBRAL DISC: ICD-10-CM

## 2021-06-17 DIAGNOSIS — M54.50 CHRONIC BILATERAL LOW BACK PAIN WITHOUT SCIATICA: Primary | ICD-10-CM

## 2021-06-17 DIAGNOSIS — G89.29 CHRONIC BILATERAL LOW BACK PAIN WITHOUT SCIATICA: Primary | ICD-10-CM

## 2021-06-17 DIAGNOSIS — G89.29 CHRONIC CERVICAL PAIN: ICD-10-CM

## 2021-06-17 DIAGNOSIS — M47.814 SPONDYLOSIS OF THORACIC REGION WITHOUT MYELOPATHY OR RADICULOPATHY: ICD-10-CM

## 2021-06-17 DIAGNOSIS — M54.2 CHRONIC CERVICAL PAIN: ICD-10-CM

## 2021-06-17 PROCEDURE — 99214 PR OFFICE/OUTPT VISIT, EST, LEVL IV, 30-39 MIN: ICD-10-PCS | Mod: S$GLB,,, | Performed by: PREVENTIVE MEDICINE

## 2021-06-17 PROCEDURE — 99214 OFFICE O/P EST MOD 30 MIN: CPT | Mod: S$GLB,,, | Performed by: PREVENTIVE MEDICINE

## 2021-06-17 RX ORDER — HYDROCODONE BITARTRATE AND ACETAMINOPHEN 10; 325 MG/1; MG/1
1 TABLET ORAL EVERY 6 HOURS PRN
Qty: 90 TABLET | Refills: 0 | Status: SHIPPED | OUTPATIENT
Start: 2021-06-17 | End: 2021-07-15 | Stop reason: SDUPTHER

## 2021-07-15 ENCOUNTER — OFFICE VISIT (OUTPATIENT)
Dept: URGENT CARE | Facility: CLINIC | Age: 71
End: 2021-07-15
Payer: OTHER GOVERNMENT

## 2021-07-15 DIAGNOSIS — G89.29 CHRONIC BILATERAL LOW BACK PAIN WITHOUT SCIATICA: Primary | ICD-10-CM

## 2021-07-15 DIAGNOSIS — S33.5XXD SPRAIN OF LIGAMENTS OF LUMBAR SPINE, SUBSEQUENT ENCOUNTER: ICD-10-CM

## 2021-07-15 DIAGNOSIS — M47.814 SPONDYLOSIS OF THORACIC REGION WITHOUT MYELOPATHY OR RADICULOPATHY: ICD-10-CM

## 2021-07-15 DIAGNOSIS — M51.36 DEGENERATION OF LUMBAR INTERVERTEBRAL DISC: ICD-10-CM

## 2021-07-15 DIAGNOSIS — G89.29 CHRONIC CERVICAL PAIN: ICD-10-CM

## 2021-07-15 DIAGNOSIS — M54.50 CHRONIC BILATERAL LOW BACK PAIN WITHOUT SCIATICA: Primary | ICD-10-CM

## 2021-07-15 DIAGNOSIS — M54.2 CHRONIC CERVICAL PAIN: ICD-10-CM

## 2021-07-15 PROCEDURE — 99214 OFFICE O/P EST MOD 30 MIN: CPT | Mod: S$GLB,,, | Performed by: PREVENTIVE MEDICINE

## 2021-07-15 PROCEDURE — 99214 PR OFFICE/OUTPT VISIT, EST, LEVL IV, 30-39 MIN: ICD-10-PCS | Mod: S$GLB,,, | Performed by: PREVENTIVE MEDICINE

## 2021-07-15 RX ORDER — DICLOFENAC SODIUM 10 MG/G
2 GEL TOPICAL DAILY
Qty: 100 G | Refills: 1 | Status: SHIPPED | OUTPATIENT
Start: 2021-07-15 | End: 2021-09-16 | Stop reason: SDUPTHER

## 2021-07-15 RX ORDER — HYDROCODONE BITARTRATE AND ACETAMINOPHEN 10; 325 MG/1; MG/1
1 TABLET ORAL EVERY 6 HOURS PRN
Qty: 90 TABLET | Refills: 0 | Status: SHIPPED | OUTPATIENT
Start: 2021-07-15 | End: 2021-08-10 | Stop reason: SDUPTHER

## 2021-07-15 RX ORDER — LIDOCAINE 50 MG/G
1 PATCH TOPICAL DAILY
Qty: 30 PATCH | Refills: 2 | Status: SHIPPED | OUTPATIENT
Start: 2021-07-15 | End: 2022-01-26 | Stop reason: SDUPTHER

## 2021-08-10 ENCOUNTER — OFFICE VISIT (OUTPATIENT)
Dept: URGENT CARE | Facility: CLINIC | Age: 71
End: 2021-08-10
Payer: OTHER GOVERNMENT

## 2021-08-10 DIAGNOSIS — M51.36 DEGENERATION OF LUMBAR INTERVERTEBRAL DISC: ICD-10-CM

## 2021-08-10 DIAGNOSIS — M54.2 CHRONIC CERVICAL PAIN: ICD-10-CM

## 2021-08-10 DIAGNOSIS — M54.50 CHRONIC BILATERAL LOW BACK PAIN WITHOUT SCIATICA: Primary | ICD-10-CM

## 2021-08-10 DIAGNOSIS — G89.29 CHRONIC BILATERAL LOW BACK PAIN WITHOUT SCIATICA: Primary | ICD-10-CM

## 2021-08-10 DIAGNOSIS — S33.5XXD SPRAIN OF LIGAMENTS OF LUMBAR SPINE, SUBSEQUENT ENCOUNTER: ICD-10-CM

## 2021-08-10 DIAGNOSIS — G89.29 CHRONIC CERVICAL PAIN: ICD-10-CM

## 2021-08-10 DIAGNOSIS — M47.814 SPONDYLOSIS OF THORACIC REGION WITHOUT MYELOPATHY OR RADICULOPATHY: ICD-10-CM

## 2021-08-10 PROCEDURE — 99214 OFFICE O/P EST MOD 30 MIN: CPT | Mod: S$GLB,,, | Performed by: PREVENTIVE MEDICINE

## 2021-08-10 PROCEDURE — 99214 PR OFFICE/OUTPT VISIT, EST, LEVL IV, 30-39 MIN: ICD-10-PCS | Mod: S$GLB,,, | Performed by: PREVENTIVE MEDICINE

## 2021-08-10 RX ORDER — HYDROCODONE BITARTRATE AND ACETAMINOPHEN 10; 325 MG/1; MG/1
1 TABLET ORAL EVERY 6 HOURS PRN
Qty: 90 TABLET | Refills: 0 | Status: SHIPPED | OUTPATIENT
Start: 2021-08-10 | End: 2021-09-16 | Stop reason: SDUPTHER

## 2021-08-10 RX ORDER — IBUPROFEN 800 MG/1
800 TABLET ORAL 3 TIMES DAILY
Qty: 60 TABLET | Refills: 0 | Status: SHIPPED | OUTPATIENT
Start: 2021-08-10 | End: 2021-09-29 | Stop reason: SDUPTHER

## 2021-09-16 ENCOUNTER — OFFICE VISIT (OUTPATIENT)
Dept: URGENT CARE | Facility: CLINIC | Age: 71
End: 2021-09-16
Payer: OTHER GOVERNMENT

## 2021-09-16 DIAGNOSIS — M54.2 CHRONIC CERVICAL PAIN: ICD-10-CM

## 2021-09-16 DIAGNOSIS — M47.814 SPONDYLOSIS OF THORACIC REGION WITHOUT MYELOPATHY OR RADICULOPATHY: ICD-10-CM

## 2021-09-16 DIAGNOSIS — M51.36 DEGENERATION OF LUMBAR INTERVERTEBRAL DISC: ICD-10-CM

## 2021-09-16 DIAGNOSIS — M54.50 CHRONIC BILATERAL LOW BACK PAIN WITHOUT SCIATICA: ICD-10-CM

## 2021-09-16 DIAGNOSIS — G89.29 CHRONIC BILATERAL LOW BACK PAIN WITHOUT SCIATICA: ICD-10-CM

## 2021-09-16 DIAGNOSIS — S33.5XXD SPRAIN OF LIGAMENTS OF LUMBAR SPINE, SUBSEQUENT ENCOUNTER: ICD-10-CM

## 2021-09-16 DIAGNOSIS — G89.29 CHRONIC CERVICAL PAIN: ICD-10-CM

## 2021-09-16 PROCEDURE — 99212 OFFICE O/P EST SF 10 MIN: CPT | Mod: 95,,, | Performed by: PREVENTIVE MEDICINE

## 2021-09-16 PROCEDURE — 99212 PR OFFICE/OUTPT VISIT, EST, LEVL II, 10-19 MIN: ICD-10-PCS | Mod: 95,,, | Performed by: PREVENTIVE MEDICINE

## 2021-09-16 RX ORDER — HYDROCODONE BITARTRATE AND ACETAMINOPHEN 10; 325 MG/1; MG/1
1 TABLET ORAL EVERY 6 HOURS PRN
Qty: 90 TABLET | Refills: 0 | Status: SHIPPED | OUTPATIENT
Start: 2021-09-16 | End: 2021-09-29 | Stop reason: SDUPTHER

## 2021-09-16 RX ORDER — DICLOFENAC SODIUM 10 MG/G
2 GEL TOPICAL DAILY
Qty: 100 G | Refills: 1 | Status: SHIPPED | OUTPATIENT
Start: 2021-09-16 | End: 2021-09-29 | Stop reason: SDUPTHER

## 2021-09-16 RX ORDER — METHOCARBAMOL 500 MG/1
500 TABLET, FILM COATED ORAL NIGHTLY
Qty: 40 TABLET | Refills: 1 | Status: SHIPPED | OUTPATIENT
Start: 2021-09-16 | End: 2021-09-29 | Stop reason: SDUPTHER

## 2021-09-28 ENCOUNTER — TELEPHONE (OUTPATIENT)
Dept: URGENT CARE | Facility: CLINIC | Age: 71
End: 2021-09-28

## 2021-09-29 ENCOUNTER — OFFICE VISIT (OUTPATIENT)
Dept: URGENT CARE | Facility: CLINIC | Age: 71
End: 2021-09-29
Payer: OTHER GOVERNMENT

## 2021-09-29 DIAGNOSIS — M47.814 SPONDYLOSIS OF THORACIC REGION WITHOUT MYELOPATHY OR RADICULOPATHY: ICD-10-CM

## 2021-09-29 DIAGNOSIS — M54.2 CHRONIC CERVICAL PAIN: ICD-10-CM

## 2021-09-29 DIAGNOSIS — G89.29 CHRONIC BILATERAL LOW BACK PAIN WITHOUT SCIATICA: Primary | ICD-10-CM

## 2021-09-29 DIAGNOSIS — S33.5XXD SPRAIN OF LIGAMENTS OF LUMBAR SPINE, SUBSEQUENT ENCOUNTER: ICD-10-CM

## 2021-09-29 DIAGNOSIS — G89.29 CHRONIC CERVICAL PAIN: ICD-10-CM

## 2021-09-29 DIAGNOSIS — M51.36 DEGENERATION OF LUMBAR INTERVERTEBRAL DISC: ICD-10-CM

## 2021-09-29 DIAGNOSIS — M54.50 CHRONIC BILATERAL LOW BACK PAIN WITHOUT SCIATICA: Primary | ICD-10-CM

## 2021-09-29 PROCEDURE — 99214 PR OFFICE/OUTPT VISIT, EST, LEVL IV, 30-39 MIN: ICD-10-PCS | Mod: S$GLB,,, | Performed by: PREVENTIVE MEDICINE

## 2021-09-29 PROCEDURE — 99214 OFFICE O/P EST MOD 30 MIN: CPT | Mod: S$GLB,,, | Performed by: PREVENTIVE MEDICINE

## 2021-09-29 RX ORDER — METHOCARBAMOL 500 MG/1
500 TABLET, FILM COATED ORAL NIGHTLY
Qty: 40 TABLET | Refills: 1 | Status: SHIPPED | OUTPATIENT
Start: 2021-09-29 | End: 2021-10-27 | Stop reason: SDUPTHER

## 2021-09-29 RX ORDER — DICLOFENAC SODIUM 10 MG/G
2 GEL TOPICAL DAILY
Qty: 100 G | Refills: 1 | Status: SHIPPED | OUTPATIENT
Start: 2021-09-29 | End: 2021-10-27 | Stop reason: SDUPTHER

## 2021-09-29 RX ORDER — IBUPROFEN 800 MG/1
800 TABLET ORAL 3 TIMES DAILY
Qty: 60 TABLET | Refills: 0 | Status: SHIPPED | OUTPATIENT
Start: 2021-09-29 | End: 2022-01-26 | Stop reason: SDUPTHER

## 2021-09-29 RX ORDER — HYDROCODONE BITARTRATE AND ACETAMINOPHEN 10; 325 MG/1; MG/1
1 TABLET ORAL EVERY 6 HOURS PRN
Qty: 90 TABLET | Refills: 0 | Status: SHIPPED | OUTPATIENT
Start: 2021-09-29 | End: 2021-10-27 | Stop reason: SDUPTHER

## 2021-10-27 ENCOUNTER — OFFICE VISIT (OUTPATIENT)
Dept: URGENT CARE | Facility: CLINIC | Age: 71
End: 2021-10-27
Payer: OTHER GOVERNMENT

## 2021-10-27 DIAGNOSIS — G89.29 CHRONIC BILATERAL LOW BACK PAIN WITHOUT SCIATICA: Primary | ICD-10-CM

## 2021-10-27 DIAGNOSIS — M47.814 SPONDYLOSIS OF THORACIC REGION WITHOUT MYELOPATHY OR RADICULOPATHY: ICD-10-CM

## 2021-10-27 DIAGNOSIS — M54.50 CHRONIC BILATERAL LOW BACK PAIN WITHOUT SCIATICA: Primary | ICD-10-CM

## 2021-10-27 DIAGNOSIS — S33.5XXD SPRAIN OF LIGAMENTS OF LUMBAR SPINE, SUBSEQUENT ENCOUNTER: ICD-10-CM

## 2021-10-27 DIAGNOSIS — M51.36 DEGENERATION OF LUMBAR INTERVERTEBRAL DISC: ICD-10-CM

## 2021-10-27 DIAGNOSIS — M54.2 CHRONIC CERVICAL PAIN: ICD-10-CM

## 2021-10-27 DIAGNOSIS — G89.29 CHRONIC CERVICAL PAIN: ICD-10-CM

## 2021-10-27 PROCEDURE — 99214 OFFICE O/P EST MOD 30 MIN: CPT | Mod: S$GLB,,, | Performed by: PREVENTIVE MEDICINE

## 2021-10-27 PROCEDURE — 99214 PR OFFICE/OUTPT VISIT, EST, LEVL IV, 30-39 MIN: ICD-10-PCS | Mod: S$GLB,,, | Performed by: PREVENTIVE MEDICINE

## 2021-10-27 RX ORDER — METHOCARBAMOL 500 MG/1
500 TABLET, FILM COATED ORAL NIGHTLY
Qty: 40 TABLET | Refills: 1 | Status: SHIPPED | OUTPATIENT
Start: 2021-10-27 | End: 2022-03-02 | Stop reason: SDUPTHER

## 2021-10-27 RX ORDER — DICLOFENAC SODIUM 10 MG/G
2 GEL TOPICAL DAILY
Qty: 100 G | Refills: 1 | Status: SHIPPED | OUTPATIENT
Start: 2021-10-27 | End: 2021-12-02 | Stop reason: SDUPTHER

## 2021-10-27 RX ORDER — HYDROCODONE BITARTRATE AND ACETAMINOPHEN 10; 325 MG/1; MG/1
1 TABLET ORAL EVERY 6 HOURS PRN
Qty: 90 TABLET | Refills: 0 | Status: SHIPPED | OUTPATIENT
Start: 2021-10-27 | End: 2021-12-02 | Stop reason: SDUPTHER

## 2021-11-09 ENCOUNTER — IMMUNIZATION (OUTPATIENT)
Dept: INTERNAL MEDICINE | Facility: CLINIC | Age: 71
End: 2021-11-09
Payer: MEDICARE

## 2021-11-09 DIAGNOSIS — Z23 NEED FOR VACCINATION: Primary | ICD-10-CM

## 2021-11-09 PROCEDURE — 91303 COVID-19,VECTOR-NR,RS-AD26,PF,0.5 ML DOSE VACCINE (JANSSEN): CPT | Mod: PBBFAC

## 2021-11-09 PROCEDURE — 0034A COVID-19,VECTOR-NR,RS-AD26,PF,0.5 ML DOSE VACCINE (JANSSEN): CPT | Mod: PBBFAC,CV19

## 2021-12-02 ENCOUNTER — OFFICE VISIT (OUTPATIENT)
Dept: URGENT CARE | Facility: CLINIC | Age: 71
End: 2021-12-02
Payer: OTHER GOVERNMENT

## 2021-12-02 DIAGNOSIS — M54.2 CHRONIC CERVICAL PAIN: ICD-10-CM

## 2021-12-02 DIAGNOSIS — S33.5XXD SPRAIN OF LIGAMENTS OF LUMBAR SPINE, SUBSEQUENT ENCOUNTER: ICD-10-CM

## 2021-12-02 DIAGNOSIS — G89.29 CHRONIC BILATERAL LOW BACK PAIN WITHOUT SCIATICA: Primary | ICD-10-CM

## 2021-12-02 DIAGNOSIS — M47.814 SPONDYLOSIS OF THORACIC REGION WITHOUT MYELOPATHY OR RADICULOPATHY: ICD-10-CM

## 2021-12-02 DIAGNOSIS — M54.50 CHRONIC BILATERAL LOW BACK PAIN WITHOUT SCIATICA: Primary | ICD-10-CM

## 2021-12-02 DIAGNOSIS — G89.29 CHRONIC CERVICAL PAIN: ICD-10-CM

## 2021-12-02 DIAGNOSIS — M51.36 DEGENERATION OF LUMBAR INTERVERTEBRAL DISC: ICD-10-CM

## 2021-12-02 PROCEDURE — 99214 PR OFFICE/OUTPT VISIT, EST, LEVL IV, 30-39 MIN: ICD-10-PCS | Mod: S$GLB,,, | Performed by: PREVENTIVE MEDICINE

## 2021-12-02 PROCEDURE — 99214 OFFICE O/P EST MOD 30 MIN: CPT | Mod: S$GLB,,, | Performed by: PREVENTIVE MEDICINE

## 2021-12-02 RX ORDER — HYDROCODONE BITARTRATE AND ACETAMINOPHEN 10; 325 MG/1; MG/1
1 TABLET ORAL EVERY 6 HOURS PRN
Qty: 100 TABLET | Refills: 0 | Status: SHIPPED | OUTPATIENT
Start: 2021-12-02 | End: 2022-01-06 | Stop reason: SDUPTHER

## 2021-12-02 RX ORDER — DICLOFENAC SODIUM 10 MG/G
2 GEL TOPICAL DAILY
Qty: 100 G | Refills: 1 | Status: SHIPPED | OUTPATIENT
Start: 2021-12-02 | End: 2022-01-06 | Stop reason: SDUPTHER

## 2022-01-06 ENCOUNTER — OFFICE VISIT (OUTPATIENT)
Dept: URGENT CARE | Facility: CLINIC | Age: 72
End: 2022-01-06
Payer: OTHER GOVERNMENT

## 2022-01-06 DIAGNOSIS — M54.50 CHRONIC BILATERAL LOW BACK PAIN WITHOUT SCIATICA: Primary | ICD-10-CM

## 2022-01-06 DIAGNOSIS — M54.2 CHRONIC CERVICAL PAIN: ICD-10-CM

## 2022-01-06 DIAGNOSIS — G89.29 CHRONIC BILATERAL LOW BACK PAIN WITHOUT SCIATICA: Primary | ICD-10-CM

## 2022-01-06 DIAGNOSIS — M51.36 DEGENERATION OF LUMBAR INTERVERTEBRAL DISC: ICD-10-CM

## 2022-01-06 DIAGNOSIS — G89.29 CHRONIC CERVICAL PAIN: ICD-10-CM

## 2022-01-06 DIAGNOSIS — M47.814 SPONDYLOSIS OF THORACIC REGION WITHOUT MYELOPATHY OR RADICULOPATHY: ICD-10-CM

## 2022-01-06 DIAGNOSIS — S33.5XXD SPRAIN OF LIGAMENTS OF LUMBAR SPINE, SUBSEQUENT ENCOUNTER: ICD-10-CM

## 2022-01-06 PROCEDURE — 99214 OFFICE O/P EST MOD 30 MIN: CPT | Mod: S$GLB,,, | Performed by: PREVENTIVE MEDICINE

## 2022-01-06 PROCEDURE — 99214 PR OFFICE/OUTPT VISIT, EST, LEVL IV, 30-39 MIN: ICD-10-PCS | Mod: S$GLB,,, | Performed by: PREVENTIVE MEDICINE

## 2022-01-06 RX ORDER — HYDROCODONE BITARTRATE AND ACETAMINOPHEN 10; 325 MG/1; MG/1
1 TABLET ORAL EVERY 6 HOURS PRN
Qty: 100 TABLET | Refills: 0 | Status: SHIPPED | OUTPATIENT
Start: 2022-01-06 | End: 2022-01-26 | Stop reason: SDUPTHER

## 2022-01-06 RX ORDER — DICLOFENAC SODIUM 10 MG/G
2 GEL TOPICAL DAILY
Qty: 100 G | Refills: 1 | Status: SHIPPED | OUTPATIENT
Start: 2022-01-06 | End: 2022-01-26 | Stop reason: SDUPTHER

## 2022-01-06 NOTE — LETTER
[facility]  [address], [address]  [place] [address]  Phone: [phone]  Fax: [phone]  [facility]: [id number]    Pt Name: Sasha John Date: 03/15/2003   Employee ID: [id number] Date of Treatment: 01/06/2022   Company: [employer]      Appointment Time: 10:00 AM Arrived: 9:25 AM   Provider: Miguel Staples MD Time Out: 10:50 AM     Office Treatment:   1. Chronic bilateral low back pain without sciatica    2. Sprain of ligaments of lumbar spine, subsequent encounter    3. Degeneration of lumbar intervertebral disc    4. Spondylosis of thoracic region without myelopathy or radiculopathy    5. Chronic cervical pain      Medications Ordered This Encounter   Medications    diclofenac sodium (VOLTAREN) 1 % Gel    HYDROcodone-acetaminophen (NORCO)  mg per tablet      Patient Instructions: Daily home exercises/warm soaks    Restrictions: Disabled until next office visit     Return Appointment: 1/26/2022 at 10:00 AM

## 2022-01-06 NOTE — PROGRESS NOTES
Subjective:       Patient ID: Sasha Michelle is a 71 y.o. female.    Chief Complaint: Back Injury (Middle and lower)    Pt is being seen today for a back injury from April 2000.  Continues to have back pain with intermittent burning after sitting for a while. Today she is in more pain toady to wear she had to take medication. Taking Norco  mg,  mg, Lidoderm 5% as needed. Patient wants to stop the patches and use the cream instead. Pain level is 9/10.      EC      Constitution: Positive for activity change and fatigue.   HENT: Negative.    Neck: neck negative.   Cardiovascular: Negative.    Eyes: Negative.    Respiratory: Negative.    Endocrine: negative.   Genitourinary: Negative.    Musculoskeletal: Positive for pain, joint pain, joint swelling, abnormal ROM of joint, back pain, muscle cramps and muscle ache.   Skin: Negative.  Negative for erythema.   Neurological: Negative for dizziness, light-headedness, headaches, numbness and tingling.   Hematologic/Lymphatic: Negative.    Psychiatric/Behavioral: Positive for sleep disturbance.        Objective:      Physical Exam  Vitals and nursing note reviewed.   Constitutional:       Appearance: Normal appearance. She is well-developed.   HENT:      Head: Normocephalic and atraumatic.      Nose: Nose normal.   Eyes:      Pupils: Pupils are equal, round, and reactive to light.   Cardiovascular:      Rate and Rhythm: Normal rate and regular rhythm.   Pulmonary:      Effort: Pulmonary effort is normal.      Breath sounds: Normal breath sounds.   Musculoskeletal:      Cervical back: Normal range of motion and neck supple. Tenderness present. No swelling, deformity, lacerations, spasms or bony tenderness.      Lumbar back: Spasms and tenderness present. No swelling, edema, deformity, lacerations or bony tenderness. Decreased range of motion.        Back:       Comments: Mild pain with palpation of mid and  low back without spasm. Pain  with flexion to 90,  extension to 25 and lateral bending to 25 degrees. No motor or sensory deficits.   Patient also has pain about thoracic spine with palpation and range of motion testing. Pain with flexion of neck to 25 degrees, extension to 10 degrees and later bending to 25 degrees.   Skin:     General: Skin is warm.      Findings: No erythema.   Neurological:      Mental Status: She is alert.      Comments: No focal neurologic deficits   Psychiatric:         Behavior: Behavior normal.         Thought Content: Thought content normal.         Judgment: Judgment normal.         Assessment:       1. Chronic bilateral low back pain without sciatica    2. Sprain of ligaments of lumbar spine, subsequent encounter    3. Degeneration of lumbar intervertebral disc    4. Spondylosis of thoracic region without myelopathy or radiculopathy    5. Chronic cervical pain        Plan:         Medications Ordered This Encounter   Medications    diclofenac sodium (VOLTAREN) 1 % Gel     Sig: Apply 2 g topically once daily. Use gloves to apply     Dispense:  100 g     Refill:  1    HYDROcodone-acetaminophen (NORCO)  mg per tablet     Sig: Take 1 tablet by mouth every 6 (six) hours as needed for Pain.     Dispense:  100 tablet     Refill:  0     Quantity prescribed more than 7 day supply? Yes, quantity medically necessary     Patient Instructions: Daily home exercises/warm soaks   Restrictions: Disabled until next office visit  Follow up in about 20 days (around 1/26/2022).

## 2022-01-26 ENCOUNTER — OFFICE VISIT (OUTPATIENT)
Dept: URGENT CARE | Facility: CLINIC | Age: 72
End: 2022-01-26
Payer: OTHER GOVERNMENT

## 2022-01-26 DIAGNOSIS — M54.50 CHRONIC BILATERAL LOW BACK PAIN WITHOUT SCIATICA: Primary | ICD-10-CM

## 2022-01-26 DIAGNOSIS — G89.29 CHRONIC CERVICAL PAIN: ICD-10-CM

## 2022-01-26 DIAGNOSIS — S33.5XXD SPRAIN OF LIGAMENTS OF LUMBAR SPINE, SUBSEQUENT ENCOUNTER: ICD-10-CM

## 2022-01-26 DIAGNOSIS — M47.814 SPONDYLOSIS OF THORACIC REGION WITHOUT MYELOPATHY OR RADICULOPATHY: ICD-10-CM

## 2022-01-26 DIAGNOSIS — M51.36 DEGENERATION OF LUMBAR INTERVERTEBRAL DISC: ICD-10-CM

## 2022-01-26 DIAGNOSIS — M54.2 CHRONIC CERVICAL PAIN: ICD-10-CM

## 2022-01-26 DIAGNOSIS — G89.29 CHRONIC BILATERAL LOW BACK PAIN WITHOUT SCIATICA: Primary | ICD-10-CM

## 2022-01-26 PROCEDURE — 99214 PR OFFICE/OUTPT VISIT, EST, LEVL IV, 30-39 MIN: ICD-10-PCS | Mod: S$GLB,,, | Performed by: PREVENTIVE MEDICINE

## 2022-01-26 PROCEDURE — 99214 OFFICE O/P EST MOD 30 MIN: CPT | Mod: S$GLB,,, | Performed by: PREVENTIVE MEDICINE

## 2022-01-26 RX ORDER — DICLOFENAC SODIUM 10 MG/G
2 GEL TOPICAL DAILY
Qty: 100 G | Refills: 1 | Status: SHIPPED | OUTPATIENT
Start: 2022-01-26 | End: 2022-03-02 | Stop reason: SDUPTHER

## 2022-01-26 RX ORDER — IBUPROFEN 800 MG/1
800 TABLET ORAL 3 TIMES DAILY
Qty: 60 TABLET | Refills: 0 | Status: SHIPPED | OUTPATIENT
Start: 2022-01-26 | End: 2022-03-02 | Stop reason: SDUPTHER

## 2022-01-26 RX ORDER — LIDOCAINE 50 MG/G
1 PATCH TOPICAL DAILY
Qty: 30 PATCH | Refills: 2 | Status: SHIPPED | OUTPATIENT
Start: 2022-01-26 | End: 2022-04-06 | Stop reason: SDUPTHER

## 2022-01-26 RX ORDER — HYDROCODONE BITARTRATE AND ACETAMINOPHEN 10; 325 MG/1; MG/1
1 TABLET ORAL EVERY 6 HOURS PRN
Qty: 100 TABLET | Refills: 0 | Status: SHIPPED | OUTPATIENT
Start: 2022-01-26 | End: 2022-03-02 | Stop reason: SDUPTHER

## 2022-01-26 NOTE — PROGRESS NOTES
Subjective:       Patient ID: Sasha Michelle is a 71 y.o. female.    Chief Complaint: Back Pain    Pt. Is here today for follow up visit for a back injury from April of 2000.  She continues to have back pain with prolonged sitting or standing.  Her pain score today is a 4/10.  She continues to take hydrocodone and ibuprofen she takes robaxin at night and uses her pain cream when traveling long distances.   LW    Back Pain  Pertinent negatives include no bladder incontinence, bowel incontinence, chest pain, fever, headaches or numbness.       Constitution: Positive for activity change and fatigue. Negative for chills and fever.   HENT: Negative.    Neck: neck negative.   Cardiovascular: Negative.  Negative for chest pain.   Eyes: Negative.    Respiratory: Negative.  Negative for cough and shortness of breath.    Gastrointestinal: Negative for bowel incontinence.   Endocrine: negative.   Genitourinary: Negative.  Negative for bladder incontinence.   Musculoskeletal: Positive for pain, joint pain, joint swelling, abnormal ROM of joint, back pain, muscle cramps and muscle ache.   Skin: Negative.  Negative for erythema.   Neurological: Negative for dizziness, light-headedness, headaches, numbness and tingling.   Hematologic/Lymphatic: Negative.    Psychiatric/Behavioral: Positive for sleep disturbance.        Objective:      Physical Exam  Vitals and nursing note reviewed.   Constitutional:       Appearance: Normal appearance. She is well-developed.   HENT:      Head: Normocephalic and atraumatic.      Nose: Nose normal.   Eyes:      Pupils: Pupils are equal, round, and reactive to light.   Cardiovascular:      Rate and Rhythm: Normal rate and regular rhythm.   Pulmonary:      Effort: Pulmonary effort is normal.      Breath sounds: Normal breath sounds.   Musculoskeletal:      Cervical back: Normal range of motion and neck supple. Tenderness present. No swelling, deformity, lacerations, spasms or bony tenderness.       Lumbar back: Spasms and tenderness present. No swelling, edema, deformity, lacerations or bony tenderness. Decreased range of motion.        Back:       Comments: Persistent pain with palpation of mid and  low back without spasm. Pain  with flexion to 90, extension to 25 and lateral bending to 25 degrees. No motor or sensory deficits.   Patient also has pain about thoracic spine with palpation and range of motion testing. Pain with flexion of neck to 25 degrees, extension to 10 degrees and later bending to 25 degrees.   Skin:     General: Skin is warm.      Findings: No erythema.   Neurological:      Mental Status: She is alert.      Comments: No focal neurologic deficits   Psychiatric:         Behavior: Behavior normal.         Thought Content: Thought content normal.         Judgment: Judgment normal.         Assessment:       1. Chronic bilateral low back pain without sciatica    2. Sprain of ligaments of lumbar spine, subsequent encounter    3. Degeneration of lumbar intervertebral disc    4. Spondylosis of thoracic region without myelopathy or radiculopathy    5. Chronic cervical pain        Plan:         Medications Ordered This Encounter   Medications    diclofenac sodium (VOLTAREN) 1 % Gel     Sig: Apply 2 g topically once daily. Use gloves to apply     Dispense:  100 g     Refill:  1    HYDROcodone-acetaminophen (NORCO)  mg per tablet     Sig: Take 1 tablet by mouth every 6 (six) hours as needed for Pain.     Dispense:  100 tablet     Refill:  0     Quantity prescribed more than 7 day supply? Yes, quantity medically necessary    ibuprofen (ADVIL,MOTRIN) 800 MG tablet     Sig: Take 1 tablet (800 mg total) by mouth 3 (three) times daily. Take medication with food     Dispense:  60 tablet     Refill:  0    LIDOcaine (LIDODERM) 5 %     Sig: Place 1 patch onto the skin once daily. Remove & Discard patch within 12 hours or as directed by MD     Dispense:  30 patch     Refill:  2     Patient  Instructions: Daily home exercises/warm soaks   Restrictions: Disabled until next office visit (Patient is retired from the [employer])  Follow up in about 5 weeks (around 3/2/2022).

## 2022-01-26 NOTE — LETTER
[facility]  [address], [address]  [place] [address]  Phone: [phone]  Fax: [phone]  [employer] [facility] [facility]: [id number]    Pt Name: Sasha John Date: 03/15/2003   Employee ID: [id number] Date of Treatment: 01/26/2022   Company: [employer]      Appointment Time: 10:00 AM Arrived: 9:55 AM   Provider: Miguel Staples MD Time Out:11:40 AM     Office Treatment:   1. Chronic bilateral low back pain without sciatica    2. Sprain of ligaments of lumbar spine, subsequent encounter    3. Degeneration of lumbar intervertebral disc    4. Spondylosis of thoracic region without myelopathy or radiculopathy    5. Chronic cervical pain      Medications Ordered This Encounter   Medications    diclofenac sodium (VOLTAREN) 1 % Gel    HYDROcodone-acetaminophen (NORCO)  mg per tablet    ibuprofen (ADVIL,MOTRIN) 800 MG tablet    LIDOcaine (LIDODERM) 5 %      Patient Instructions: Daily home exercises/warm soaks    Restrictions: Disabled until next office visit (Patient is retired from the post office)     Return Appointment:     3/2/2022 at 10:00 AM    LW

## 2022-03-02 ENCOUNTER — CLINICAL SUPPORT (OUTPATIENT)
Dept: URGENT CARE | Facility: CLINIC | Age: 72
End: 2022-03-02
Payer: OTHER GOVERNMENT

## 2022-03-02 DIAGNOSIS — M54.50 CHRONIC BILATERAL LOW BACK PAIN WITHOUT SCIATICA: Primary | ICD-10-CM

## 2022-03-02 DIAGNOSIS — G89.29 CHRONIC BILATERAL LOW BACK PAIN WITHOUT SCIATICA: Primary | ICD-10-CM

## 2022-03-02 DIAGNOSIS — G89.29 CHRONIC CERVICAL PAIN: ICD-10-CM

## 2022-03-02 DIAGNOSIS — S33.5XXD SPRAIN OF LIGAMENTS OF LUMBAR SPINE, SUBSEQUENT ENCOUNTER: ICD-10-CM

## 2022-03-02 DIAGNOSIS — M47.814 SPONDYLOSIS OF THORACIC REGION WITHOUT MYELOPATHY OR RADICULOPATHY: ICD-10-CM

## 2022-03-02 DIAGNOSIS — M54.2 CHRONIC CERVICAL PAIN: ICD-10-CM

## 2022-03-02 DIAGNOSIS — M51.36 DEGENERATION OF LUMBAR INTERVERTEBRAL DISC: ICD-10-CM

## 2022-03-02 RX ORDER — IBUPROFEN 800 MG/1
800 TABLET ORAL 3 TIMES DAILY
Qty: 60 TABLET | Refills: 0 | Status: SHIPPED | OUTPATIENT
Start: 2022-03-02 | End: 2022-04-06 | Stop reason: SDUPTHER

## 2022-03-02 RX ORDER — HYDROCODONE BITARTRATE AND ACETAMINOPHEN 10; 325 MG/1; MG/1
1 TABLET ORAL EVERY 6 HOURS PRN
Qty: 100 TABLET | Refills: 0 | Status: SHIPPED | OUTPATIENT
Start: 2022-03-02 | End: 2022-04-06 | Stop reason: SDUPTHER

## 2022-03-02 RX ORDER — DICLOFENAC SODIUM 10 MG/G
2 GEL TOPICAL DAILY
Qty: 100 G | Refills: 1 | Status: SHIPPED | OUTPATIENT
Start: 2022-03-02 | End: 2022-04-06 | Stop reason: SDUPTHER

## 2022-03-02 RX ORDER — METHOCARBAMOL 500 MG/1
500 TABLET, FILM COATED ORAL NIGHTLY
Qty: 40 TABLET | Refills: 1 | Status: SHIPPED | OUTPATIENT
Start: 2022-03-02 | End: 2022-04-06 | Stop reason: ALTCHOICE

## 2022-03-02 NOTE — LETTER
Occupational [facility] - Urgent Care  [address], [address]  [place] [address]  Phone: [phone]  Fax: [phone]  [facility]: [id number]    Pt Name: Sasha John Date: 03/15/2003   Employee ID: [id number] Date of Treatment: 03/02/2022   Company: [employer]      Appointment Time: 4:00pm Arrived: 4:00pm   Provider: [facility] Time Out:4:10pm     Office Treatment:   1. Chronic bilateral low back pain without sciatica    2. Sprain of ligaments of lumbar spine, subsequent encounter    3. Degeneration of lumbar intervertebral disc    4. Spondylosis of thoracic region without myelopathy or radiculopathy    5. Chronic cervical pain      Medications Ordered This Encounter   Medications    diclofenac sodium (VOLTAREN) 1 % Gel    HYDROcodone-acetaminophen (NORCO)  mg per tablet    ibuprofen (ADVIL,MOTRIN) 800 MG tablet    methocarbamoL (ROBAXIN) 500 MG Tab      Patient Instructions: Daily home exercises/warm soaks (Patient is retired)     Restrictions: Disabled until next office visit   ** ATTENTION ATTENTION ATTENTION **  [facility] will be moving locations effective March 14, 2022     Our New Address will be   [address] [place] [id number]   Monday-Friday 8:30am-5:00pm      Return Appointment: 4/6/2022 at 4:00pm    EC

## 2022-03-02 NOTE — PROGRESS NOTES
Subjective:       Patient ID: Sasha Michelle is a 71 y.o. female.    Chief Complaint:  Back pain    Patient is evaluated with virtual visit as she has an upper respiratory infection at this time.  She continues to have back pain with any prolonged activity such as standing walking or sitting.  Her pain score remains 4 to 6/10 and she continues to apply warm soaks with exercises on a daily basis.  She has been using the anti-inflammatory cream which helps resolve her back pain especially when traveling long distances.      Constitution: Negative.   HENT: Negative.    Cardiovascular: Negative.    Eyes: Negative.    Respiratory: Negative.    Gastrointestinal: Negative.    Endocrine: negative.   Genitourinary: Negative.    Musculoskeletal: Positive for pain, joint pain, abnormal ROM of joint, back pain and muscle ache.   Skin: Negative.  Negative for erythema.   Allergic/Immunologic: Negative.    Hematologic/Lymphatic: Negative.         Objective:      Physical Exam  Vitals and nursing note reviewed.   Constitutional:       Appearance: Normal appearance. She is well-developed.   HENT:      Head: Normocephalic and atraumatic.      Nose: Nose normal.   Eyes:      Pupils: Pupils are equal, round, and reactive to light.   Cardiovascular:      Rate and Rhythm: Normal rate and regular rhythm.   Pulmonary:      Effort: Pulmonary effort is normal.      Breath sounds: Normal breath sounds.   Musculoskeletal:      Cervical back: Normal range of motion and neck supple. Tenderness present. No swelling, deformity, lacerations, spasms or bony tenderness.      Lumbar back: Spasms and tenderness present. No swelling, edema, deformity, lacerations or bony tenderness. Decreased range of motion.        Back:       Comments: Persistent pain with palpation of mid and  low back without spasm. Pain  with flexion to 90, extension to 25 and lateral bending to 25 degrees. No motor or sensory deficits.   Patient also has pain about thoracic  spine with palpation and range of motion testing. Pain with flexion of neck to 25 degrees, extension to 10 degrees and later bending to 25 degrees.   Skin:     General: Skin is warm.      Findings: No erythema.   Neurological:      Mental Status: She is alert.      Comments: No focal neurologic deficits   Psychiatric:         Behavior: Behavior normal.         Thought Content: Thought content normal.         Judgment: Judgment normal.         Assessment:       1. Chronic bilateral low back pain without sciatica    2. Sprain of ligaments of lumbar spine, subsequent encounter    3. Degeneration of lumbar intervertebral disc    4. Spondylosis of thoracic region without myelopathy or radiculopathy    5. Chronic cervical pain        Plan:         Medications Ordered This Encounter   Medications    diclofenac sodium (VOLTAREN) 1 % Gel     Sig: Apply 2 g topically once daily. Use gloves to apply     Dispense:  100 g     Refill:  1    HYDROcodone-acetaminophen (NORCO)  mg per tablet     Sig: Take 1 tablet by mouth every 6 (six) hours as needed for Pain.     Dispense:  100 tablet     Refill:  0     Quantity prescribed more than 7 day supply? Yes, quantity medically necessary    ibuprofen (ADVIL,MOTRIN) 800 MG tablet     Sig: Take 1 tablet (800 mg total) by mouth 3 (three) times daily. Take medication with food     Dispense:  60 tablet     Refill:  0    methocarbamoL (ROBAXIN) 500 MG Tab     Sig: Take 1 tablet (500 mg total) by mouth nightly.     Dispense:  40 tablet     Refill:  1     Patient Instructions: Daily home exercises/warm soaks (Patient is retired)   Restrictions: Disabled until next office visit  Follow up in about 5 weeks (around 4/6/2022).

## 2022-04-06 ENCOUNTER — OFFICE VISIT (OUTPATIENT)
Dept: URGENT CARE | Facility: CLINIC | Age: 72
End: 2022-04-06
Payer: OTHER GOVERNMENT

## 2022-04-06 DIAGNOSIS — G89.29 CHRONIC BILATERAL LOW BACK PAIN WITHOUT SCIATICA: ICD-10-CM

## 2022-04-06 DIAGNOSIS — Z02.6 ENCOUNTER RELATED TO WORKER'S COMPENSATION CLAIM: Primary | ICD-10-CM

## 2022-04-06 DIAGNOSIS — M51.36 DEGENERATION OF LUMBAR INTERVERTEBRAL DISC: ICD-10-CM

## 2022-04-06 DIAGNOSIS — M54.2 CHRONIC CERVICAL PAIN: ICD-10-CM

## 2022-04-06 DIAGNOSIS — S33.5XXD SPRAIN OF LIGAMENTS OF LUMBAR SPINE, SUBSEQUENT ENCOUNTER: ICD-10-CM

## 2022-04-06 DIAGNOSIS — G89.29 CHRONIC CERVICAL PAIN: ICD-10-CM

## 2022-04-06 DIAGNOSIS — M47.814 SPONDYLOSIS OF THORACIC REGION WITHOUT MYELOPATHY OR RADICULOPATHY: ICD-10-CM

## 2022-04-06 DIAGNOSIS — M54.50 CHRONIC BILATERAL LOW BACK PAIN WITHOUT SCIATICA: ICD-10-CM

## 2022-04-06 PROCEDURE — 99214 PR OFFICE/OUTPT VISIT, EST, LEVL IV, 30-39 MIN: ICD-10-PCS | Mod: S$GLB,,, | Performed by: PREVENTIVE MEDICINE

## 2022-04-06 PROCEDURE — 99214 OFFICE O/P EST MOD 30 MIN: CPT | Mod: S$GLB,,, | Performed by: PREVENTIVE MEDICINE

## 2022-04-06 RX ORDER — TIZANIDINE 4 MG/1
4 TABLET ORAL NIGHTLY
Qty: 30 TABLET | Refills: 1 | Status: SHIPPED | OUTPATIENT
Start: 2022-04-06 | End: 2022-05-03 | Stop reason: SDUPTHER

## 2022-04-06 RX ORDER — LIDOCAINE 50 MG/G
1 PATCH TOPICAL DAILY
Qty: 30 PATCH | Refills: 2 | Status: SHIPPED | OUTPATIENT
Start: 2022-04-06 | End: 2022-05-03 | Stop reason: SDUPTHER

## 2022-04-06 RX ORDER — DICLOFENAC SODIUM 10 MG/G
2 GEL TOPICAL DAILY
Qty: 100 G | Refills: 1 | Status: SHIPPED | OUTPATIENT
Start: 2022-04-06 | End: 2022-05-03 | Stop reason: SDUPTHER

## 2022-04-06 RX ORDER — HYDROCODONE BITARTRATE AND ACETAMINOPHEN 10; 325 MG/1; MG/1
1 TABLET ORAL EVERY 6 HOURS PRN
Qty: 100 TABLET | Refills: 0 | Status: SHIPPED | OUTPATIENT
Start: 2022-04-06 | End: 2022-05-03 | Stop reason: SDUPTHER

## 2022-04-06 RX ORDER — IBUPROFEN 800 MG/1
800 TABLET ORAL 3 TIMES DAILY
Qty: 60 TABLET | Refills: 0 | Status: SHIPPED | OUTPATIENT
Start: 2022-04-06 | End: 2022-05-03 | Stop reason: SDUPTHER

## 2022-04-06 NOTE — LETTER
[facility]  [address] [facility] [address] [address]  Phone: [phone]  Fax: [phone]  [facility]: [id number]    Pt Name: Sasha John Date: 03/15/2003   Employee ID: [id number] Date of Treatment: 04/06/2022   Company: [employer]      Appointment Time: 03:45 PM Arrived: 10:17 am   Provider: Miguel Staples MD Time Out:11:45 am     Office Treatment:   1. Encounter related to worker's compensation claim    2. Chronic bilateral low back pain without sciatica    3. Sprain of ligaments of lumbar spine, subsequent encounter    4. Degeneration of lumbar intervertebral disc    5. Spondylosis of thoracic region without myelopathy or radiculopathy    6. Chronic cervical pain      Medications Ordered This Encounter   Medications    diclofenac sodium (VOLTAREN) 1 % Gel    HYDROcodone-acetaminophen (NORCO)  mg per tablet    ibuprofen (ADVIL,MOTRIN) 800 MG tablet    LIDOcaine (LIDODERM) 5 %    tiZANidine (ZANAFLEX) 4 MG tablet      Patient Instructions: Daily home exercises/warm soaks      Restrictions: Disabled until next office visit (Patient is retired and permanently disabled)     Return Appointment: 5/3/2022 at 9:00 am          Livan

## 2022-04-06 NOTE — PROGRESS NOTES
Subjective:       Patient ID: Sasha Michelle is a 71 y.o. female.    Chief Complaint: Back Pain     Follow-up of Back Pain ( DOI April 2000 ) Pain score 8/10 with complaints of Constant Burning Pain, Prolonged sitting increases pain, No stiffness, No numbness/tingling, No pain w/walking. Taking Norco 10-325mg, IBP 800mg, using lidodrem 5%, Voltaren 1%. SH    Back Pain  Pertinent negatives include no bowel incontinence.       Constitution: Negative.   HENT: Negative.    Cardiovascular: Negative.    Eyes: Negative.    Respiratory: Negative.    Gastrointestinal: Negative.  Negative for bowel incontinence.   Endocrine: negative.   Genitourinary: Negative.    Musculoskeletal: Positive for pain, joint pain, abnormal ROM of joint and back pain. Negative for joint swelling, muscle cramps and muscle ache.   Skin: Negative.  Negative for color change, erythema and bruising.   Allergic/Immunologic: Negative.    Neurological: Negative for tingling and seizures.   Hematologic/Lymphatic: Negative.         Objective:      Physical Exam  Vitals and nursing note reviewed.   Constitutional:       Appearance: Normal appearance. She is well-developed.   HENT:      Head: Normocephalic and atraumatic.      Nose: Nose normal.   Eyes:      Pupils: Pupils are equal, round, and reactive to light.   Cardiovascular:      Rate and Rhythm: Normal rate and regular rhythm.   Pulmonary:      Effort: Pulmonary effort is normal.      Breath sounds: Normal breath sounds.   Musculoskeletal:      Cervical back: Normal range of motion and neck supple. Tenderness present. No swelling, deformity, lacerations, spasms or bony tenderness.      Lumbar back: Spasms and tenderness present. No swelling, edema, deformity, lacerations or bony tenderness. Decreased range of motion.        Back:       Comments: Persistent pain with palpation of mid and  low back without spasm. Pain  with flexion to 90, extension to 25 and lateral bending to 25 degrees. No motor or  sensory deficits.   Patient also has pain about thoracic spine with palpation and range of motion testing. Pain with flexion of neck to 25 degrees, extension to 10 degrees and later bending to 25 degrees.   Skin:     General: Skin is warm.      Findings: No erythema.   Neurological:      Mental Status: She is alert.      Comments: No focal neurologic deficits   Psychiatric:         Behavior: Behavior normal.         Thought Content: Thought content normal.         Judgment: Judgment normal.         Assessment:       1. Encounter related to worker's compensation claim    2. Chronic bilateral low back pain without sciatica    3. Sprain of ligaments of lumbar spine, subsequent encounter    4. Degeneration of lumbar intervertebral disc    5. Spondylosis of thoracic region without myelopathy or radiculopathy    6. Chronic cervical pain        Plan:         Medications Ordered This Encounter   Medications    diclofenac sodium (VOLTAREN) 1 % Gel     Sig: Apply 2 g topically once daily. Use gloves to apply     Dispense:  100 g     Refill:  1    HYDROcodone-acetaminophen (NORCO)  mg per tablet     Sig: Take 1 tablet by mouth every 6 (six) hours as needed for Pain.     Dispense:  100 tablet     Refill:  0     Quantity prescribed more than 7 day supply? Yes, quantity medically necessary    ibuprofen (ADVIL,MOTRIN) 800 MG tablet     Sig: Take 1 tablet (800 mg total) by mouth 3 (three) times daily. Take medication with food     Dispense:  60 tablet     Refill:  0    LIDOcaine (LIDODERM) 5 %     Sig: Place 1 patch onto the skin once daily. Remove & Discard patch within 12 hours or as directed by MD     Dispense:  30 patch     Refill:  2    tiZANidine (ZANAFLEX) 4 MG tablet     Sig: Take 1 tablet (4 mg total) by mouth nightly.     Dispense:  30 tablet     Refill:  1     Patient Instructions: Daily home exercises/warm soaks   Restrictions: Disabled until next office visit (Patient is retired and permanently  disabled)  Follow up in about 27 days (around 5/3/2022).

## 2022-04-27 ENCOUNTER — TELEPHONE (OUTPATIENT)
Dept: URGENT CARE | Facility: CLINIC | Age: 72
End: 2022-04-27
Payer: MEDICARE

## 2022-04-27 NOTE — TELEPHONE ENCOUNTER
Mrs. Sasha Michelle called the [facility] and I there was a message left for me to give her a call. I called Mrs. Baez back and I received her  Voice mail, left her a message letting her know that I returned the call. AFG

## 2022-05-03 ENCOUNTER — OFFICE VISIT (OUTPATIENT)
Dept: URGENT CARE | Facility: CLINIC | Age: 72
End: 2022-05-03
Payer: OTHER GOVERNMENT

## 2022-05-03 DIAGNOSIS — M47.814 SPONDYLOSIS OF THORACIC REGION WITHOUT MYELOPATHY OR RADICULOPATHY: ICD-10-CM

## 2022-05-03 DIAGNOSIS — S33.5XXD SPRAIN OF LIGAMENTS OF LUMBAR SPINE, SUBSEQUENT ENCOUNTER: ICD-10-CM

## 2022-05-03 DIAGNOSIS — M51.36 DEGENERATION OF LUMBAR INTERVERTEBRAL DISC: ICD-10-CM

## 2022-05-03 DIAGNOSIS — M54.50 CHRONIC BILATERAL LOW BACK PAIN WITHOUT SCIATICA: ICD-10-CM

## 2022-05-03 DIAGNOSIS — G89.29 CHRONIC BILATERAL LOW BACK PAIN WITHOUT SCIATICA: ICD-10-CM

## 2022-05-03 DIAGNOSIS — Z02.6 ENCOUNTER RELATED TO WORKER'S COMPENSATION CLAIM: Primary | ICD-10-CM

## 2022-05-03 DIAGNOSIS — G89.29 CHRONIC CERVICAL PAIN: ICD-10-CM

## 2022-05-03 DIAGNOSIS — M54.2 CHRONIC CERVICAL PAIN: ICD-10-CM

## 2022-05-03 PROCEDURE — 99214 OFFICE O/P EST MOD 30 MIN: CPT | Mod: S$GLB,,, | Performed by: PREVENTIVE MEDICINE

## 2022-05-03 PROCEDURE — 99214 PR OFFICE/OUTPT VISIT, EST, LEVL IV, 30-39 MIN: ICD-10-PCS | Mod: S$GLB,,, | Performed by: PREVENTIVE MEDICINE

## 2022-05-03 RX ORDER — DICLOFENAC SODIUM 10 MG/G
2 GEL TOPICAL DAILY
Qty: 100 G | Refills: 1 | Status: SHIPPED | OUTPATIENT
Start: 2022-05-03 | End: 2023-06-07

## 2022-05-03 RX ORDER — TIZANIDINE 4 MG/1
4 TABLET ORAL NIGHTLY
Qty: 30 TABLET | Refills: 1 | Status: SHIPPED | OUTPATIENT
Start: 2022-05-03 | End: 2022-06-02

## 2022-05-03 RX ORDER — LIDOCAINE 50 MG/G
1 PATCH TOPICAL DAILY
Qty: 30 PATCH | Refills: 2 | Status: SHIPPED | OUTPATIENT
Start: 2022-05-03 | End: 2022-08-15 | Stop reason: SDUPTHER

## 2022-05-03 RX ORDER — HYDROCODONE BITARTRATE AND ACETAMINOPHEN 10; 325 MG/1; MG/1
1 TABLET ORAL EVERY 6 HOURS PRN
Qty: 100 TABLET | Refills: 0 | Status: SHIPPED | OUTPATIENT
Start: 2022-05-03 | End: 2022-06-06 | Stop reason: SDUPTHER

## 2022-05-03 RX ORDER — IBUPROFEN 800 MG/1
800 TABLET ORAL 3 TIMES DAILY
Qty: 60 TABLET | Refills: 0 | Status: SHIPPED | OUTPATIENT
Start: 2022-05-03 | End: 2022-07-11 | Stop reason: SDUPTHER

## 2022-05-03 NOTE — PROGRESS NOTES
Subjective:       Patient ID: Sasha Michelle is a 71 y.o. female.    Chief Complaint: Back Pain     Follow-up of Back Pain ( DOI April 2000 ) Pain score 7/10 with complaints of Constant Burning Pain, Prolonged sitting increases pain, No stiffness, No numbness/tingling, No pain w/walking. Taking Norco 10-325mg, IBP 800mg, using lidodrem 5%, Voltaren 1%. mcj    Back Pain  Pertinent negatives include no bowel incontinence.       Constitution: Negative.   HENT: Negative.    Cardiovascular: Negative.    Eyes: Negative.    Respiratory: Negative.    Gastrointestinal: Negative.  Negative for bowel incontinence.   Endocrine: negative.   Genitourinary: Negative.    Musculoskeletal: Positive for pain, joint pain, abnormal ROM of joint and back pain. Negative for joint swelling, muscle cramps and muscle ache.   Skin: Negative.  Negative for color change, erythema and bruising.   Allergic/Immunologic: Negative.    Neurological: Negative for tingling and seizures.   Hematologic/Lymphatic: Negative.         Objective:      Physical Exam  Vitals and nursing note reviewed.   Constitutional:       Appearance: Normal appearance. She is well-developed.   HENT:      Head: Normocephalic and atraumatic.      Nose: Nose normal.   Eyes:      Pupils: Pupils are equal, round, and reactive to light.   Cardiovascular:      Rate and Rhythm: Normal rate and regular rhythm.   Pulmonary:      Effort: Pulmonary effort is normal.      Breath sounds: Normal breath sounds.   Musculoskeletal:      Cervical back: Normal range of motion and neck supple. Tenderness present. No swelling, deformity, lacerations, spasms or bony tenderness.      Lumbar back: Spasms and tenderness present. No swelling, edema, deformity, lacerations or bony tenderness. Decreased range of motion.        Back:       Comments: Persistent pain with palpation of mid and  low back without spasm. Pain  with flexion to 90, extension to 25 and lateral bending to 25 degrees. No motor  or sensory deficits.   Patient also has pain about thoracic spine with palpation and range of motion testing. Pain with flexion of neck to 25 degrees, extension to 10 degrees and later bending to 25 degrees.   Skin:     General: Skin is warm.      Findings: No erythema.   Neurological:      Mental Status: She is alert.      Comments: No focal neurologic deficits   Psychiatric:         Behavior: Behavior normal.         Thought Content: Thought content normal.         Judgment: Judgment normal.         Assessment:       1. Encounter related to worker's compensation claim    2. Chronic bilateral low back pain without sciatica    3. Sprain of ligaments of lumbar spine, subsequent encounter    4. Degeneration of lumbar intervertebral disc    5. Spondylosis of thoracic region without myelopathy or radiculopathy    6. Chronic cervical pain        Plan:         Medications Ordered This Encounter   Medications    diclofenac sodium (VOLTAREN) 1 % Gel     Sig: Apply 2 g topically once daily. Use gloves to apply     Dispense:  100 g     Refill:  1    HYDROcodone-acetaminophen (NORCO)  mg per tablet     Sig: Take 1 tablet by mouth every 6 (six) hours as needed for Pain.     Dispense:  100 tablet     Refill:  0     Quantity prescribed more than 7 day supply? Yes, quantity medically necessary    ibuprofen (ADVIL,MOTRIN) 800 MG tablet     Sig: Take 1 tablet (800 mg total) by mouth 3 (three) times daily. Take medication with food     Dispense:  60 tablet     Refill:  0    LIDOcaine (LIDODERM) 5 %     Sig: Place 1 patch onto the skin once daily. Remove & Discard patch within 12 hours or as directed by MD     Dispense:  30 patch     Refill:  2    tiZANidine (ZANAFLEX) 4 MG tablet     Sig: Take 1 tablet (4 mg total) by mouth nightly.     Dispense:  30 tablet     Refill:  1     Patient Instructions: Daily home exercises/warm soaks   Restrictions: Disabled until next office visit (Patient is retired and permanently  disabled)  Follow up in about 1 month (around 6/6/2022).

## 2022-05-03 NOTE — LETTER
[facility]  [address] [facility] [facility] [address]  Phone: [phone]  Fax: [phone]  [facility]: [id number]    Pt Name: Sasha John Date: 03/15/2003   Employee ID: [id number] Date of Treatment: 05/03/2022   Company: [employer]      Appointment Time: 09:00 AM Arrived: 8:57 AM   Provider: Miguel Staples MD Time Out:10:05 AM     Office Treatment:   1. Encounter related to worker's compensation claim    2. Chronic bilateral low back pain without sciatica    3. Sprain of ligaments of lumbar spine, subsequent encounter    4. Degeneration of lumbar intervertebral disc    5. Spondylosis of thoracic region without myelopathy or radiculopathy    6. Chronic cervical pain      Medications Ordered This Encounter   Medications    diclofenac sodium (VOLTAREN) 1 % Gel    HYDROcodone-acetaminophen (NORCO)  mg per tablet    ibuprofen (ADVIL,MOTRIN) 800 MG tablet    LIDOcaine (LIDODERM) 5 %    tiZANidine (ZANAFLEX) 4 MG tablet      Patient Instructions: Daily home exercises/warm soaks      Restrictions: Disabled until next office visit (Patient is retired and permanently disabled)     Return Appointment: 6/6/2022 at 8:45 AM NIKOLAY

## 2022-06-06 ENCOUNTER — OFFICE VISIT (OUTPATIENT)
Dept: URGENT CARE | Facility: CLINIC | Age: 72
End: 2022-06-06
Payer: OTHER GOVERNMENT

## 2022-06-06 DIAGNOSIS — G89.29 CHRONIC BILATERAL LOW BACK PAIN WITHOUT SCIATICA: Primary | ICD-10-CM

## 2022-06-06 DIAGNOSIS — M54.50 CHRONIC BILATERAL LOW BACK PAIN WITHOUT SCIATICA: Primary | ICD-10-CM

## 2022-06-06 DIAGNOSIS — S33.5XXD SPRAIN OF LIGAMENTS OF LUMBAR SPINE, SUBSEQUENT ENCOUNTER: ICD-10-CM

## 2022-06-06 PROCEDURE — 99214 PR OFFICE/OUTPT VISIT, EST, LEVL IV, 30-39 MIN: ICD-10-PCS | Mod: S$GLB,,,

## 2022-06-06 PROCEDURE — 99214 OFFICE O/P EST MOD 30 MIN: CPT | Mod: S$GLB,,,

## 2022-06-06 RX ORDER — HYDROCODONE BITARTRATE AND ACETAMINOPHEN 10; 325 MG/1; MG/1
1 TABLET ORAL EVERY 6 HOURS PRN
Qty: 100 TABLET | Refills: 0 | Status: SHIPPED | OUTPATIENT
Start: 2022-06-06 | End: 2022-07-11 | Stop reason: SDUPTHER

## 2022-06-06 NOTE — LETTER
[facility]  [address] [facility] [address]  Phone: [phone]  Fax: [phone]  [facility]: [id number]    Pt Name: Sasha Michelle  Injury Date: [birth year]15/2003   Employee ID:[id number] Date of First Treatment: 06/06/2022   Company: [employer]      Appointment Time: 08:30 AM Arrived: 8:15am   Provider: Mendez Montes, DO Time Out: 9:30am     Office Treatment:   1. Chronic bilateral low back pain without sciatica    2. Sprain of ligaments of lumbar spine, subsequent encounter      Medications Ordered This Encounter   Medications    HYDROcodone-acetaminophen (NORCO)  mg per tablet      Patient Instructions:  (Continue perform home stretching activities.)          Return Appointment: 7/11/2022 at 8:30am

## 2022-06-06 NOTE — PROGRESS NOTES
Subjective:       Patient ID: Sasha Michelle is a 71 y.o. female.    Chief Complaint: Back Pain    Returns today for evaluation for chronic thoracic and lumbar pain.  She injured her back early 2000s when she was employed as a [employer] .  She lifting a heavy tray of mail and twisting to place the tray into her vehicle when she developed pain to her back.  Treatment has included physical therapy, multiple DOTTIE injections, and medication use.  The last DOTTIE injections were of no benefit and therefore has been managed through use of home stretching exercises and medications.  Today she states some increased in right lower back pain over the past 2-3 weeks.  No lower extremity symptoms.  Past medications have included Voltaren gel, hydrocodone 10/325, ibuprofen, Lidoderm patches, and Zanaflex.  Today, she only needs refill of the hydrocodone.  She has been tolerating this medication regimen without any difficulties or issues.     Follow-up of Back pain ( DOI 04/2000 ) Pain score 9/10 with complaints of Constant Throbbing pain of RT Side of Lower Back and Hip, Intermittent pain with walking, No numbness/tingling, No stiffness. SH    Back Pain  Pertinent negatives include no numbness.       Constitution: Negative.   Neck: neck negative.   Cardiovascular: Negative.    Eyes: Negative.    Respiratory: Negative.    Gastrointestinal: Negative.    Endocrine: negative.   Genitourinary: Negative.    Musculoskeletal: Positive for pain, joint pain and back pain. Negative for trauma, joint swelling, muscle cramps and muscle ache.   Skin: Negative.    Allergic/Immunologic: Negative.    Neurological: Negative for numbness and tingling.   Psychiatric/Behavioral: Negative.         Objective:      Physical Exam  Nursing note reviewed.   Constitutional:       Appearance: Normal appearance.   HENT:      Head: Normocephalic.   Eyes:      Conjunctiva/sclera: Conjunctivae normal.   Musculoskeletal:      Right shoulder:  Normal. Normal range of motion. Normal strength.      Left shoulder: Normal. Normal range of motion. Normal strength.      Cervical back: Full passive range of motion without pain and normal range of motion.      Thoracic back: Tenderness present. No swelling, deformity or bony tenderness. Normal range of motion.      Lumbar back: Tenderness present. No swelling or deformity. Normal range of motion. Negative right straight leg raise test and negative left straight leg raise test.        Back:       Comments: No gross deformity to the thoracic or lumbar spine.  She has some mild tenderness on palpation to the right thoracic paraspinous muscles and just along right scapular edge.  Tenderness on palpation to the right lumbar paraspinous muscles and laterally to the right hip area.  Her gait is normal heel and toe walking is normal.  She is able to squat and climb on and off the examination table without difficulty or assistance.  5/5 upper extremity and lower extremity strength.  Symmetrical upper and lower extremity reflexes bilaterally.   Skin:     General: Skin is warm.      Capillary Refill: Capillary refill takes less than 2 seconds.   Neurological:      General: No focal deficit present.      Mental Status: She is alert and oriented to person, place, and time.      Gait: Gait is intact.      Deep Tendon Reflexes: Reflexes are normal and symmetric.   Psychiatric:         Attention and Perception: Attention normal.         Mood and Affect: Mood and affect normal.         Speech: Speech normal.         Behavior: Behavior is cooperative.         Assessment:       1. Chronic bilateral low back pain without sciatica        Plan:       Although she has been retired for a number years, she has continued to have back pain has been managed through use of home exercises and medication use.  She had a recent flare-up of the right lower lumbar pain but is being managed with the current treatment regimen.  She is aware that we  will periodically review of medication and treatment plan sure that she is obtaining the relief that she needs.  At this time I do not have any indication to make any changes to the current regimen.  As she has been stable on this regimen and not interested in any further intervention I will continue to manage her on a regular basis. I will have her follow-up in approximately 5 weeks time.     Patient Instructions:  (Continue perform home stretching activities.)      Follow up in about 5 weeks (around 7/11/2022).

## 2022-07-11 ENCOUNTER — OFFICE VISIT (OUTPATIENT)
Dept: URGENT CARE | Facility: CLINIC | Age: 72
End: 2022-07-11
Payer: OTHER GOVERNMENT

## 2022-07-11 VITALS
OXYGEN SATURATION: 98 % | TEMPERATURE: 99 F | BODY MASS INDEX: 25.95 KG/M2 | SYSTOLIC BLOOD PRESSURE: 181 MMHG | DIASTOLIC BLOOD PRESSURE: 76 MMHG | HEIGHT: 62 IN | HEART RATE: 66 BPM | WEIGHT: 141 LBS

## 2022-07-11 DIAGNOSIS — M54.2 CHRONIC CERVICAL PAIN: ICD-10-CM

## 2022-07-11 DIAGNOSIS — G89.29 CHRONIC BILATERAL LOW BACK PAIN WITHOUT SCIATICA: Primary | ICD-10-CM

## 2022-07-11 DIAGNOSIS — M51.36 DEGENERATION OF LUMBAR INTERVERTEBRAL DISC: ICD-10-CM

## 2022-07-11 DIAGNOSIS — M54.50 CHRONIC BILATERAL LOW BACK PAIN WITHOUT SCIATICA: Primary | ICD-10-CM

## 2022-07-11 DIAGNOSIS — S33.5XXD SPRAIN OF LIGAMENTS OF LUMBAR SPINE, SUBSEQUENT ENCOUNTER: ICD-10-CM

## 2022-07-11 DIAGNOSIS — G89.29 CHRONIC CERVICAL PAIN: ICD-10-CM

## 2022-07-11 DIAGNOSIS — M47.814 SPONDYLOSIS OF THORACIC REGION WITHOUT MYELOPATHY OR RADICULOPATHY: ICD-10-CM

## 2022-07-11 PROCEDURE — 99214 PR OFFICE/OUTPT VISIT, EST, LEVL IV, 30-39 MIN: ICD-10-PCS | Mod: S$GLB,,, | Performed by: EMERGENCY MEDICINE

## 2022-07-11 PROCEDURE — 99214 OFFICE O/P EST MOD 30 MIN: CPT | Mod: S$GLB,,, | Performed by: EMERGENCY MEDICINE

## 2022-07-11 RX ORDER — HYDROCODONE BITARTRATE AND ACETAMINOPHEN 10; 325 MG/1; MG/1
1 TABLET ORAL EVERY 6 HOURS PRN
Qty: 100 TABLET | Refills: 0 | Status: SHIPPED | OUTPATIENT
Start: 2022-07-11 | End: 2022-08-15 | Stop reason: SDUPTHER

## 2022-07-11 RX ORDER — TIZANIDINE 4 MG/1
4 TABLET ORAL EVERY 8 HOURS
Qty: 84 TABLET | Refills: 0 | Status: SHIPPED | OUTPATIENT
Start: 2022-07-11 | End: 2022-08-08

## 2022-07-11 RX ORDER — IBUPROFEN 800 MG/1
800 TABLET ORAL 3 TIMES DAILY
Qty: 60 TABLET | Refills: 0 | Status: SHIPPED | OUTPATIENT
Start: 2022-07-11 | End: 2022-08-15 | Stop reason: SDUPTHER

## 2022-07-11 NOTE — PROGRESS NOTES
Subjective:       Patient ID: Sasha Michelle is a 71 y.o. female.    Chief Complaint: Back Pain      Follow-up of Back pain ( DOI 04/2000 ) Pain score 7/10 with complaints of Constant Throbbing pain of RT Side of Lower Back and Hip, no pain with walking, no numbness/tingling, no stiffness. Patient reports she had surgery on her left hand due to trigger finger. [facility]    Patient reports no change in her symptoms.  She does get relief from home exercise program and the medications provided.  She has been retired since 2007 and her injury occurred 22 years ago.   She denies any new weakness in the legs or bowel or bladder issues.  Will refill the medications that she needs and is out of that she has been treated chronically here for years and have her follow up with Dr. Montes in 5 weeks.    ROS    Constitution: Negative for activity change.   HENT: Negative.    Cardiovascular: Negative.    Eyes: Negative.    Respiratory: Negative.    Gastrointestinal: Negative.    Endocrine: negative.   Genitourinary: Negative.    Musculoskeletal: Positive for pain and back pain.   Skin: Negative.    Allergic/Immunologic: Negative.    Neurological: Negative.         Objective:      Physical Exam  Nursing note reviewed.   Constitutional:       Appearance: Normal appearance.   HENT:      Head: Normocephalic.   Eyes:      Conjunctiva/sclera: Conjunctivae normal.   Musculoskeletal:      Right shoulder: Normal. Normal range of motion. Normal strength.      Left shoulder: Normal. Normal range of motion. Normal strength.      Cervical back: Full passive range of motion without pain and normal range of motion.      Thoracic back: Tenderness present. No swelling, deformity or bony tenderness. Normal range of motion.      Lumbar back: Tenderness present. No swelling or deformity. Normal range of motion. Negative right straight leg raise test and negative left straight leg raise test.        Back:       Comments: No gross deformity to the  thoracic or lumbar spine.  She has some mild tenderness on palpation to the right thoracic paraspinous muscles and just along right scapular edge.  Tenderness on palpation to the right lumbar paraspinous muscles and laterally to the right hip area.  Her gait is normal heel and toe walking is normal.  She is able to squat and climb on and off the examination table without difficulty or assistance.  5/5 upper extremity and lower extremity strength.  Symmetrical upper and lower extremity reflexes bilaterally.   Skin:     General: Skin is warm.      Capillary Refill: Capillary refill takes less than 2 seconds.   Neurological:      General: No focal deficit present.      Mental Status: She is alert and oriented to person, place, and time.      Gait: Gait is intact.      Deep Tendon Reflexes: Reflexes are normal and symmetric.   Psychiatric:         Attention and Perception: Attention normal.         Mood and Affect: Mood and affect normal.         Speech: Speech normal.         Behavior: Behavior is cooperative.         Assessment:       1. Chronic bilateral low back pain without sciatica    2. Sprain of ligaments of lumbar spine, subsequent encounter    3. Degeneration of lumbar intervertebral disc    4. Spondylosis of thoracic region without myelopathy or radiculopathy    5. Chronic cervical pain        Plan:       Patient reports no change in her symptoms.  She does get relief from home exercise program and the medications provided.  She has been retired since 2007 and her injury occurred 22 years ago.   She denies any new weakness in the legs or bowel or bladder issues.  Will refill the medications that she needs and is out of that she has been treated chronically here for years and have her follow up with Dr. Montes in 5 weeks.         Patient Instructions: Attention not to aggravate affected area, Daily home exercises/warm soaks   Restrictions: Disabled until next office visit  Follow up in about 5 weeks (around  8/15/2022).

## 2022-07-11 NOTE — LETTER
[facility]  [address] [address]  Phone: [phone]  Fax: [phone]  [facility]: [id number]    Pt Name: Sasha John Date: 03/15/2003   Employee ID:[id number] Date of Treatment: 07/11/2022   Company: [employer]      Appointment Time: 08:30 AM Arrived: 08:44 AM   Provider: Shashi South MD Time Out: 09:50 AM     Office Treatment:   1. Chronic bilateral low back pain without sciatica    2. Sprain of ligaments of lumbar spine, subsequent encounter    3. Degeneration of lumbar intervertebral disc    4. Spondylosis of thoracic region without myelopathy or radiculopathy    5. Chronic cervical pain      Medications Ordered This Encounter   Medications    HYDROcodone-acetaminophen (NORCO)  mg per tablet    ibuprofen (ADVIL,MOTRIN) 800 MG tablet    tiZANidine (ZANAFLEX) 4 MG tablet      Patient Instructions: Attention not to aggravate affected area, Daily home exercises/warm soaks      Restrictions: Disabled until next office visit     Return Appointment:   8/15/2022 at 08:30 AM     SH

## 2022-07-19 ENCOUNTER — OFFICE VISIT (OUTPATIENT)
Dept: URGENT CARE | Facility: CLINIC | Age: 72
End: 2022-07-19
Payer: OTHER GOVERNMENT

## 2022-07-19 DIAGNOSIS — G89.29 CHRONIC BILATERAL LOW BACK PAIN WITHOUT SCIATICA: ICD-10-CM

## 2022-07-19 DIAGNOSIS — Z02.6 ENCOUNTER RELATED TO WORKER'S COMPENSATION CLAIM: Primary | ICD-10-CM

## 2022-07-19 DIAGNOSIS — G89.29 CHRONIC CERVICAL PAIN: ICD-10-CM

## 2022-07-19 DIAGNOSIS — M51.36 DEGENERATION OF LUMBAR INTERVERTEBRAL DISC: ICD-10-CM

## 2022-07-19 DIAGNOSIS — M47.814 SPONDYLOSIS OF THORACIC REGION WITHOUT MYELOPATHY OR RADICULOPATHY: ICD-10-CM

## 2022-07-19 DIAGNOSIS — S33.5XXD SPRAIN OF LIGAMENTS OF LUMBAR SPINE, SUBSEQUENT ENCOUNTER: ICD-10-CM

## 2022-07-19 DIAGNOSIS — M54.2 CHRONIC CERVICAL PAIN: ICD-10-CM

## 2022-07-19 DIAGNOSIS — M54.50 CHRONIC BILATERAL LOW BACK PAIN WITHOUT SCIATICA: ICD-10-CM

## 2022-07-19 PROCEDURE — 99214 OFFICE O/P EST MOD 30 MIN: CPT | Mod: S$GLB,,, | Performed by: PREVENTIVE MEDICINE

## 2022-07-19 PROCEDURE — 99214 PR OFFICE/OUTPT VISIT, EST, LEVL IV, 30-39 MIN: ICD-10-PCS | Mod: S$GLB,,, | Performed by: PREVENTIVE MEDICINE

## 2022-07-19 NOTE — PROGRESS NOTES
Subjective:       Patient ID: Sasha Michelle is a 71 y.o. female.    Chief Complaint: Back Pain    RV Back Pain [employer] ( DOI 04/2000 ) Pain score 7/10 with complaints of constant throbbing pain of right munir of lower back and hip but it's not as bad as before. Still no pain with walking,  numbness/tingling, no stiffness. She still does get relief from home exercise and the medications provided. She denies any new weakness in the legs or bowel or bladder issues.  EC    Back Pain  Pertinent negatives include no bladder incontinence, bowel incontinence or numbness.       Constitution: Positive for activity change and generalized weakness.   HENT: Negative.    Neck: Negative for neck pain and neck stiffness.   Cardiovascular: Negative.    Eyes: Negative.    Respiratory: Negative.    Gastrointestinal: Negative.  Negative for bowel incontinence.   Endocrine: negative.   Genitourinary: Negative.  Negative for bladder incontinence.   Musculoskeletal: Positive for pain, back pain, muscle cramps and muscle ache. Negative for joint pain, joint swelling and abnormal ROM of joint.   Skin: Negative.  Negative for color change, erythema and bruising.   Allergic/Immunologic: Negative.    Neurological: Negative for numbness, tingling and seizures.   Hematologic/Lymphatic: Negative.         Objective:      Physical Exam  Vitals and nursing note reviewed.   Constitutional:       Appearance: Normal appearance. She is well-developed.   HENT:      Head: Normocephalic and atraumatic.      Nose: Nose normal.   Eyes:      Pupils: Pupils are equal, round, and reactive to light.   Cardiovascular:      Rate and Rhythm: Normal rate and regular rhythm.   Pulmonary:      Effort: Pulmonary effort is normal.      Breath sounds: Normal breath sounds.   Musculoskeletal:      Cervical back: Normal range of motion and neck supple. Tenderness present. No swelling, deformity, lacerations, spasms or bony tenderness.      Lumbar back: Spasms and tenderness  present. No swelling, edema, deformity, lacerations or bony tenderness. Decreased range of motion.        Back:       Comments: Persistent pain with palpation of mid and  low back without spasm. Pain  with flexion to 90, extension to 25 and lateral bending to 25 degrees. No motor or sensory deficits.   Patient also has pain about thoracic spine with palpation and range of motion testing. Pain with flexion of neck to 25 degrees, extension to 10 degrees and later bending to 25 degrees.   Skin:     General: Skin is warm.      Findings: No erythema.   Neurological:      Mental Status: She is alert.      Comments: No focal neurologic deficits   Psychiatric:         Behavior: Behavior normal.         Thought Content: Thought content normal.         Judgment: Judgment normal.         Assessment:       1. Encounter related to worker's compensation claim    2. Chronic bilateral low back pain without sciatica    3. Sprain of ligaments of lumbar spine, subsequent encounter    4. Degeneration of lumbar intervertebral disc    5. Spondylosis of thoracic region without myelopathy or radiculopathy    6. Chronic cervical pain        Plan:     discussed with patient issues she has been having with some of the medication prescribed.  She will continue taking her pain medication only as needed and use both the lidocaine patches and Voltaren gel on a regular basis.       Patient Instructions: Daily home exercises/warm soaks   Restrictions: Disabled until next office visit  Follow up in about 27 days (around 8/15/2022).

## 2022-08-15 ENCOUNTER — OFFICE VISIT (OUTPATIENT)
Dept: URGENT CARE | Facility: CLINIC | Age: 72
End: 2022-08-15
Payer: OTHER GOVERNMENT

## 2022-08-15 VITALS
WEIGHT: 141 LBS | BODY MASS INDEX: 25.95 KG/M2 | SYSTOLIC BLOOD PRESSURE: 185 MMHG | HEART RATE: 59 BPM | DIASTOLIC BLOOD PRESSURE: 79 MMHG | HEIGHT: 62 IN | TEMPERATURE: 98 F

## 2022-08-15 DIAGNOSIS — Z02.6 ENCOUNTER RELATED TO WORKER'S COMPENSATION CLAIM: Primary | ICD-10-CM

## 2022-08-15 DIAGNOSIS — S33.5XXD SPRAIN OF LIGAMENTS OF LUMBAR SPINE, SUBSEQUENT ENCOUNTER: ICD-10-CM

## 2022-08-15 DIAGNOSIS — G89.29 CHRONIC BILATERAL LOW BACK PAIN WITHOUT SCIATICA: ICD-10-CM

## 2022-08-15 DIAGNOSIS — M51.36 DEGENERATION OF LUMBAR INTERVERTEBRAL DISC: ICD-10-CM

## 2022-08-15 DIAGNOSIS — M47.814 SPONDYLOSIS OF THORACIC REGION WITHOUT MYELOPATHY OR RADICULOPATHY: ICD-10-CM

## 2022-08-15 DIAGNOSIS — G89.29 CHRONIC CERVICAL PAIN: ICD-10-CM

## 2022-08-15 DIAGNOSIS — M54.50 CHRONIC BILATERAL LOW BACK PAIN WITHOUT SCIATICA: ICD-10-CM

## 2022-08-15 DIAGNOSIS — M54.2 CHRONIC CERVICAL PAIN: ICD-10-CM

## 2022-08-15 PROCEDURE — 99214 OFFICE O/P EST MOD 30 MIN: CPT | Mod: S$GLB,,,

## 2022-08-15 PROCEDURE — 99214 PR OFFICE/OUTPT VISIT, EST, LEVL IV, 30-39 MIN: ICD-10-PCS | Mod: S$GLB,,,

## 2022-08-15 RX ORDER — LIDOCAINE 50 MG/G
1 PATCH TOPICAL DAILY
Qty: 30 PATCH | Refills: 2 | Status: SHIPPED | OUTPATIENT
Start: 2022-08-15 | End: 2022-12-12 | Stop reason: SDUPTHER

## 2022-08-15 RX ORDER — TIZANIDINE 4 MG/1
4 TABLET ORAL NIGHTLY PRN
Qty: 30 TABLET | Refills: 1 | Status: SHIPPED | OUTPATIENT
Start: 2022-08-15 | End: 2022-10-14

## 2022-08-15 RX ORDER — HYDROCODONE BITARTRATE AND ACETAMINOPHEN 10; 325 MG/1; MG/1
1 TABLET ORAL EVERY 6 HOURS PRN
Qty: 100 TABLET | Refills: 0 | Status: SHIPPED | OUTPATIENT
Start: 2022-08-15 | End: 2022-09-26 | Stop reason: SDUPTHER

## 2022-08-15 RX ORDER — IBUPROFEN 800 MG/1
800 TABLET ORAL 3 TIMES DAILY
Qty: 60 TABLET | Refills: 0 | Status: SHIPPED | OUTPATIENT
Start: 2022-08-15 | End: 2022-09-26 | Stop reason: SDUPTHER

## 2022-08-15 NOTE — PROGRESS NOTES
Subjective:       Patient ID: Sasha Michelle is a 71 y.o. female.    Chief Complaint: Back Pain    Follow-up evaluation regarding chronic LBP.. Clinically, stable with use of ibuprofen, lidoderm, norco 10/325, and zanaflex. She needs refill of the medications.    Rv Back Pain [employer] (DOI 3/15/01). She is here to follow up on back pain. She has already spoken to Dr. Staples for her visit. He will be refilling all her medications.   EC    Back Pain  Pertinent negatives include no numbness.       Constitution: Positive for activity change.   HENT: Negative.    Neck: neck negative.   Cardiovascular: Negative.    Eyes: Negative.    Respiratory: Negative.    Gastrointestinal: Negative.    Endocrine: negative.   Genitourinary: Negative.    Musculoskeletal: Positive for pain, back pain, muscle cramps and muscle ache. Negative for joint pain, joint swelling and abnormal ROM of joint.   Skin: Negative.    Neurological: Negative.  Negative for loss of balance, numbness and tingling.   Psychiatric/Behavioral: Negative for sleep disturbance.        Objective:      Physical Exam  Vitals and nursing note reviewed.   Constitutional:       General: She is not in acute distress.     Appearance: She is normal weight.   HENT:      Head: Normocephalic.   Eyes:      Conjunctiva/sclera: Conjunctivae normal.   Musculoskeletal:      Cervical back: Normal range of motion.      Lumbar back: Tenderness present. No swelling, edema, deformity or bony tenderness. Normal range of motion. Negative right straight leg raise test and negative left straight leg raise test.        Back:       Comments: No deformity to lumbar spine. Mild pain to right lateral lumbar area on palpation and during ROM assessment. Full ROM L-spine.   Skin:     General: Skin is warm.      Capillary Refill: Capillary refill takes less than 2 seconds.   Neurological:      General: No focal deficit present.      Mental Status: She is alert and oriented to person, place, and time.       Gait: Gait is intact.   Psychiatric:         Attention and Perception: Attention normal.         Mood and Affect: Mood and affect normal.         Speech: Speech normal.         Behavior: Behavior normal. Behavior is cooperative.         Assessment:       1. Encounter related to worker's compensation claim    2. Chronic bilateral low back pain without sciatica    3. Sprain of ligaments of lumbar spine, subsequent encounter    4. Degeneration of lumbar intervertebral disc    5. Spondylosis of thoracic region without myelopathy or radiculopathy    6. Chronic cervical pain        Plan:         Medications Ordered This Encounter   Medications    HYDROcodone-acetaminophen (NORCO)  mg per tablet     Sig: Take 1 tablet by mouth every 6 (six) hours as needed for Pain.     Dispense:  100 tablet     Refill:  0     Quantity prescribed more than 7 day supply? Yes, quantity medically necessary    ibuprofen (ADVIL,MOTRIN) 800 MG tablet     Sig: Take 1 tablet (800 mg total) by mouth 3 (three) times daily. Take medication with food     Dispense:  60 tablet     Refill:  0    LIDOcaine (LIDODERM) 5 %     Sig: Place 1 patch onto the skin once daily. Remove & Discard patch within 12 hours or as directed by MD     Dispense:  30 patch     Refill:  2    tiZANidine (ZANAFLEX) 4 MG tablet     Sig: Take 1 tablet (4 mg total) by mouth nightly as needed (pain/spasm).     Dispense:  30 tablet     Refill:  1   Clinically she remains stable on the prescribed medications.  Refills have been provided today and we will have her follow-up in approximately 6 weeks         Follow up in about 6 weeks (around 9/26/2022).

## 2022-08-15 NOTE — LETTER
[facility]  [address] [address]  Phone: [phone]  Fax: [phone]  [facility]: [id number]    Pt Name: Sasha John Date: 03/15/2003   Employee ID: [id number] Date of  Treatment: 08/15/2022   Company: [employer]      Appointment Time: 08:30 AM Arrived: 8:15 AM    Provider: Mendez Montes, DO Time Out: 9:15 AM     Office Treatment:   1. Encounter related to worker's compensation claim    2. Chronic bilateral low back pain without sciatica    3. Sprain of ligaments of lumbar spine, subsequent encounter    4. Degeneration of lumbar intervertebral disc    5. Spondylosis of thoracic region without myelopathy or radiculopathy    6. Chronic cervical pain      Medications Ordered This Encounter   Medications    HYDROcodone-acetaminophen (NORCO)  mg per tablet    ibuprofen (ADVIL,MOTRIN) 800 MG tablet    LIDOcaine (LIDODERM) 5 %    tiZANidine (ZANAFLEX) 4 MG tablet                 Return Appointment: 9/26/2022 at 8:30 AM [facility]

## 2022-09-26 ENCOUNTER — OFFICE VISIT (OUTPATIENT)
Dept: URGENT CARE | Facility: CLINIC | Age: 72
End: 2022-09-26
Payer: OTHER GOVERNMENT

## 2022-09-26 VITALS
SYSTOLIC BLOOD PRESSURE: 161 MMHG | WEIGHT: 141 LBS | HEART RATE: 68 BPM | DIASTOLIC BLOOD PRESSURE: 81 MMHG | HEIGHT: 62 IN | TEMPERATURE: 99 F | BODY MASS INDEX: 25.95 KG/M2

## 2022-09-26 DIAGNOSIS — G89.29 CHRONIC CERVICAL PAIN: ICD-10-CM

## 2022-09-26 DIAGNOSIS — M54.2 CHRONIC CERVICAL PAIN: ICD-10-CM

## 2022-09-26 DIAGNOSIS — Z02.6 ENCOUNTER RELATED TO WORKER'S COMPENSATION CLAIM: Primary | ICD-10-CM

## 2022-09-26 DIAGNOSIS — S33.5XXD SPRAIN OF LIGAMENTS OF LUMBAR SPINE, SUBSEQUENT ENCOUNTER: ICD-10-CM

## 2022-09-26 DIAGNOSIS — M51.36 DEGENERATION OF LUMBAR INTERVERTEBRAL DISC: ICD-10-CM

## 2022-09-26 DIAGNOSIS — M54.50 CHRONIC BILATERAL LOW BACK PAIN WITHOUT SCIATICA: ICD-10-CM

## 2022-09-26 DIAGNOSIS — G89.29 CHRONIC BILATERAL LOW BACK PAIN WITHOUT SCIATICA: ICD-10-CM

## 2022-09-26 DIAGNOSIS — M47.814 SPONDYLOSIS OF THORACIC REGION WITHOUT MYELOPATHY OR RADICULOPATHY: ICD-10-CM

## 2022-09-26 PROCEDURE — 99214 OFFICE O/P EST MOD 30 MIN: CPT | Mod: S$GLB,,,

## 2022-09-26 PROCEDURE — 99214 PR OFFICE/OUTPT VISIT, EST, LEVL IV, 30-39 MIN: ICD-10-PCS | Mod: S$GLB,,,

## 2022-09-26 RX ORDER — HYDROCODONE BITARTRATE AND ACETAMINOPHEN 10; 325 MG/1; MG/1
1 TABLET ORAL EVERY 6 HOURS PRN
Qty: 100 TABLET | Refills: 0 | Status: SHIPPED | OUTPATIENT
Start: 2022-09-26 | End: 2022-11-07 | Stop reason: SDUPTHER

## 2022-09-26 RX ORDER — IBUPROFEN 800 MG/1
800 TABLET ORAL 3 TIMES DAILY
Qty: 60 TABLET | Refills: 0 | Status: SHIPPED | OUTPATIENT
Start: 2022-09-26 | End: 2022-11-07 | Stop reason: SDUPTHER

## 2022-09-26 NOTE — LETTER
[facility]  [address] [address]  Phone: [phone]  Fax: [phone]  [facility]: [id number]    Pt Name: Sasha John Date: 03/15/2003   Employee ID: [id number] Date of Treatment: 09/26/2022   Company: [employer]      Appointment Time: 08:30 AM Arrived: 8:40 AM    Provider: Mendez Montes, DO Time Out: 10:00 AM      Office Treatment:   1. Encounter related to worker's compensation claim    2. Chronic bilateral low back pain without sciatica    3. Sprain of ligaments of lumbar spine, subsequent encounter    4. Degeneration of lumbar intervertebral disc    5. Spondylosis of thoracic region without myelopathy or radiculopathy    6. Chronic cervical pain      Medications Ordered This Encounter   Medications    HYDROcodone-acetaminophen (NORCO)  mg per tablet    ibuprofen (ADVIL,MOTRIN) 800 MG tablet                 Return Appointment: 11/7/2022 at 8:45 AM KASSY

## 2022-11-07 ENCOUNTER — OFFICE VISIT (OUTPATIENT)
Dept: URGENT CARE | Facility: CLINIC | Age: 72
End: 2022-11-07
Payer: OTHER GOVERNMENT

## 2022-11-07 VITALS
DIASTOLIC BLOOD PRESSURE: 83 MMHG | RESPIRATION RATE: 18 BRPM | HEIGHT: 62 IN | HEART RATE: 65 BPM | SYSTOLIC BLOOD PRESSURE: 187 MMHG | BODY MASS INDEX: 25.95 KG/M2 | WEIGHT: 141 LBS

## 2022-11-07 DIAGNOSIS — G89.29 CHRONIC CERVICAL PAIN: ICD-10-CM

## 2022-11-07 DIAGNOSIS — S33.5XXD SPRAIN OF LIGAMENTS OF LUMBAR SPINE, SUBSEQUENT ENCOUNTER: ICD-10-CM

## 2022-11-07 DIAGNOSIS — M47.814 SPONDYLOSIS OF THORACIC REGION WITHOUT MYELOPATHY OR RADICULOPATHY: ICD-10-CM

## 2022-11-07 DIAGNOSIS — M51.36 DEGENERATION OF LUMBAR INTERVERTEBRAL DISC: ICD-10-CM

## 2022-11-07 DIAGNOSIS — M54.50 CHRONIC BILATERAL LOW BACK PAIN WITHOUT SCIATICA: Primary | ICD-10-CM

## 2022-11-07 DIAGNOSIS — M54.2 CHRONIC CERVICAL PAIN: ICD-10-CM

## 2022-11-07 DIAGNOSIS — G89.29 CHRONIC BILATERAL LOW BACK PAIN WITHOUT SCIATICA: Primary | ICD-10-CM

## 2022-11-07 PROCEDURE — 99214 OFFICE O/P EST MOD 30 MIN: CPT | Mod: S$GLB,,, | Performed by: EMERGENCY MEDICINE

## 2022-11-07 PROCEDURE — 99214 PR OFFICE/OUTPT VISIT, EST, LEVL IV, 30-39 MIN: ICD-10-PCS | Mod: S$GLB,,, | Performed by: EMERGENCY MEDICINE

## 2022-11-07 RX ORDER — HYDROCODONE BITARTRATE AND ACETAMINOPHEN 10; 325 MG/1; MG/1
1 TABLET ORAL EVERY 6 HOURS PRN
Qty: 100 TABLET | Refills: 0 | Status: SHIPPED | OUTPATIENT
Start: 2022-11-07 | End: 2022-12-12 | Stop reason: SDUPTHER

## 2022-11-07 RX ORDER — IBUPROFEN 800 MG/1
800 TABLET ORAL 3 TIMES DAILY
Qty: 60 TABLET | Refills: 0 | Status: SHIPPED | OUTPATIENT
Start: 2022-11-07 | End: 2022-12-12 | Stop reason: SDUPTHER

## 2022-11-07 NOTE — LETTER
[facility]  [address] [address]  Phone: [phone]  Fax: [phone]  [facility]: [id number]    Pt Name: Sasha John Date: 03/15/2003   Employee ID: [id number] Date of Treatment: 11/07/2022   Company: [employer]      Appointment Time: 08:45 AM Arrived: 8:30 AM   Provider: Shashi South MD Time Out: 9:32 AM     Office Treatment:   1. Chronic bilateral low back pain without sciatica    2. Degeneration of lumbar intervertebral disc    3. Sprain of ligaments of lumbar spine, subsequent encounter    4. Spondylosis of thoracic region without myelopathy or radiculopathy    5. Chronic cervical pain      Medications Ordered This Encounter   Medications    HYDROcodone-acetaminophen (NORCO)  mg per tablet    ibuprofen (ADVIL,MOTRIN) 800 MG tablet      Patient Instructions: Attention not to aggravate affected area    Restrictions:  (ca 17, retired)     Return Appointment: 12/19/2022 at 8:30 AM NIKOLAY

## 2022-11-07 NOTE — PROGRESS NOTES
Subjective:       Patient ID: Sasha Michelle is a 71 y.o. female.    Chief Complaint: Back Pain    WC Follow-up of Lower Back ( DOI  ) Worked for [employer] - Retired. Pain score 6/10 with complaints of Constant Burning/Aching pain, No stiffness, ROM good. Taking IBP 800mg, Norco 10-325mg, lidocaine 5% patches,Muscle Relaxer. SH    Patient is retired from the [employer] however has been doing some work at the [employer] and is here for medication refill.  Reports mild persistent pain to the right low back.  No change in exam or history.  Refilled medications and will have her return in 5-6 weeks for re-evaluation.  Ca 17 form filled out and ibuprofen and hydrocodone prescriptions filled out.    Shoulder Injury   Pertinent negatives include no chest pain or numbness.   ROS    Constitution: Negative for chills, fatigue and fever.   HENT:  Negative for ear pain, sinus pain and sore throat.    Neck: Negative for neck pain and neck stiffness.   Cardiovascular:  Negative for chest pain, palpitations and sob on exertion.   Eyes:  Negative for eye pain and vision loss.   Respiratory:  Negative for cough, shortness of breath and asthma.    Gastrointestinal:  Negative for abdominal pain, nausea, vomiting and diarrhea.   Genitourinary:  Negative for dysuria, frequency and hematuria.   Musculoskeletal:  Positive for pain, joint pain, back pain and muscle ache. Negative for trauma, joint swelling, abnormal ROM of joint and muscle cramps.   Skin:  Negative for rash and wound.   Allergic/Immunologic: Negative for seasonal allergies and asthma.   Neurological:  Negative for dizziness, light-headedness, altered mental status, numbness and tingling.   Psychiatric/Behavioral:  Negative for altered mental status and confusion.       Objective:      Physical Exam  Vitals and nursing note reviewed.   Constitutional:       General: She is not in acute distress.     Appearance: Normal appearance.   HENT:      Head:  Normocephalic.   Eyes:      Conjunctiva/sclera: Conjunctivae normal.   Musculoskeletal:      Cervical back: Normal range of motion.      Lumbar back: Tenderness present. No swelling or deformity. Normal range of motion. Negative right straight leg raise test and negative left straight leg raise test.        Back:    Skin:     General: Skin is warm.      Capillary Refill: Capillary refill takes less than 2 seconds.   Neurological:      General: No focal deficit present.      Mental Status: She is alert and oriented to person, place, and time.      Gait: Gait is intact.      Deep Tendon Reflexes: Reflexes are normal and symmetric.   Psychiatric:         Attention and Perception: Attention normal.         Mood and Affect: Mood and affect normal.         Speech: Speech normal.         Behavior: Behavior normal. Behavior is cooperative.       Assessment:       1. Chronic bilateral low back pain without sciatica    2. Degeneration of lumbar intervertebral disc    3. Sprain of ligaments of lumbar spine, subsequent encounter    4. Spondylosis of thoracic region without myelopathy or radiculopathy    5. Chronic cervical pain          Plan:         Patient is retired from the [employer] however has been doing some work at the [employer] and is here for medication refill.  Reports mild persistent pain to the right low back.  No change in exam or history.  Refilled medications and will have her return in 5-6 weeks for re-evaluation.  Ca 17 form filled out and ibuprofen and hydrocodone prescriptions filled out.    Medications Ordered This Encounter   Medications    HYDROcodone-acetaminophen (NORCO)  mg per tablet     Sig: Take 1 tablet by mouth every 6 (six) hours as needed for Pain.     Dispense:  100 tablet     Refill:  0     Quantity prescribed more than 7 day supply? Yes, quantity medically necessary    ibuprofen (ADVIL,MOTRIN) 800 MG tablet     Sig: Take 1 tablet (800 mg total) by mouth 3 (three)  times daily. Take medication with food     Dispense:  60 tablet     Refill:  0     Patient Instructions: Attention not to aggravate affected area   Restrictions:  (ca 17, retired)  Follow up in about 6 weeks (around 12/19/2022).

## 2022-12-12 ENCOUNTER — OFFICE VISIT (OUTPATIENT)
Dept: URGENT CARE | Facility: CLINIC | Age: 72
End: 2022-12-12
Payer: OTHER GOVERNMENT

## 2022-12-12 VITALS
OXYGEN SATURATION: 94 % | WEIGHT: 141 LBS | HEIGHT: 62 IN | BODY MASS INDEX: 25.95 KG/M2 | TEMPERATURE: 98 F | HEART RATE: 60 BPM | DIASTOLIC BLOOD PRESSURE: 83 MMHG | SYSTOLIC BLOOD PRESSURE: 163 MMHG

## 2022-12-12 DIAGNOSIS — G89.29 CHRONIC CERVICAL PAIN: ICD-10-CM

## 2022-12-12 DIAGNOSIS — G89.29 CHRONIC BILATERAL LOW BACK PAIN WITHOUT SCIATICA: ICD-10-CM

## 2022-12-12 DIAGNOSIS — S33.5XXD SPRAIN OF LIGAMENTS OF LUMBAR SPINE, SUBSEQUENT ENCOUNTER: ICD-10-CM

## 2022-12-12 DIAGNOSIS — M54.2 CHRONIC CERVICAL PAIN: ICD-10-CM

## 2022-12-12 DIAGNOSIS — M51.36 DEGENERATION OF LUMBAR INTERVERTEBRAL DISC: ICD-10-CM

## 2022-12-12 DIAGNOSIS — M54.50 CHRONIC BILATERAL LOW BACK PAIN WITHOUT SCIATICA: ICD-10-CM

## 2022-12-12 DIAGNOSIS — Z02.6 ENCOUNTER RELATED TO WORKER'S COMPENSATION CLAIM: Primary | ICD-10-CM

## 2022-12-12 DIAGNOSIS — M47.814 SPONDYLOSIS OF THORACIC REGION WITHOUT MYELOPATHY OR RADICULOPATHY: ICD-10-CM

## 2022-12-12 PROCEDURE — 99214 PR OFFICE/OUTPT VISIT, EST, LEVL IV, 30-39 MIN: ICD-10-PCS | Mod: S$GLB,,,

## 2022-12-12 PROCEDURE — 99214 OFFICE O/P EST MOD 30 MIN: CPT | Mod: S$GLB,,,

## 2022-12-12 RX ORDER — HYDROCODONE BITARTRATE AND ACETAMINOPHEN 10; 325 MG/1; MG/1
1 TABLET ORAL EVERY 6 HOURS PRN
Qty: 100 TABLET | Refills: 0 | Status: SHIPPED | OUTPATIENT
Start: 2022-12-12 | End: 2023-01-13 | Stop reason: SDUPTHER

## 2022-12-12 RX ORDER — IBUPROFEN 800 MG/1
800 TABLET ORAL 3 TIMES DAILY
Qty: 60 TABLET | Refills: 0 | Status: SHIPPED | OUTPATIENT
Start: 2022-12-12 | End: 2023-01-13 | Stop reason: SDUPTHER

## 2022-12-12 RX ORDER — TIZANIDINE 4 MG/1
4 TABLET ORAL NIGHTLY PRN
Qty: 30 TABLET | Refills: 0 | Status: SHIPPED | OUTPATIENT
Start: 2022-12-12 | End: 2023-01-11

## 2022-12-12 RX ORDER — LIDOCAINE 50 MG/G
1 PATCH TOPICAL DAILY
Qty: 30 PATCH | Refills: 2 | Status: SHIPPED | OUTPATIENT
Start: 2022-12-12 | End: 2023-02-16 | Stop reason: SDUPTHER

## 2022-12-12 NOTE — LETTER
[facility]  [address] [address]  Phone: [phone]  Fax: [phone]  [facility]: [id number]    Pt Name: Sasha Michelle  Injury Date: 03/15/2003   Employee ID: [id number] Date of Treatment: 12/12/2022   Company: [employer]      Appointment Time: 11:00 AM Arrived: 9:26 AM    Provider: Mendez Montes, DO Time Out: 10:27 AM      Office Treatment:   1. Encounter related to worker's compensation claim    2. Sprain of ligaments of lumbar spine, subsequent encounter    3. Chronic bilateral low back pain without sciatica    4. Degeneration of lumbar intervertebral disc    5. Spondylosis of thoracic region without myelopathy or radiculopathy    6. Chronic cervical pain      Medications Ordered This Encounter   Medications    HYDROcodone-acetaminophen (NORCO)  mg per tablet    ibuprofen (ADVIL,MOTRIN) 800 MG tablet    LIDOcaine (LIDODERM) 5 %    tiZANidine (ZANAFLEX) 4 MG tablet                 Return Appointment: 1/13/2023 at 9:15 AM [facility]

## 2022-12-12 NOTE — PROGRESS NOTES
Subjective:       Patient ID: Sasha Michelle is a 71 y.o. female.    Chief Complaint: Back Pain    See MA note.  Follow-up evaluation for chronic lower back pain.  Her condition is managed through medication as previous attempts of DOTTIE had been unsuccessful.  She recently was assisting her  out of the tub and aggravated her right lower back symptoms.  Normally she will take her hydrocodone once a day but the recent aggravation required use 2-3 times per day.  She is requesting a refill of her Norco, ibuprofen, lidocaine patches, and Zanaflex.      Follow-up of Lower Back ( DOI  ) Worked for [employer] - Retired. Patient reports constant aching and burning pain due to long periods of standing and sitting. Patient reports no problems bending or squatting at this time. No numbness or tingling. She is still taking IBP 800mg and Norco 10-325mg. She uses the lidocaine 5% patches occasionally. No PT at this time. Current pain score 9/10. LRC      Constitution: Negative.   HENT: Negative.     Neck: neck negative.   Cardiovascular: Negative.    Eyes: Negative.    Respiratory: Negative.     Gastrointestinal: Negative.    Endocrine: negative.   Genitourinary: Negative.    Musculoskeletal:  Positive for pain and back pain.   Skin: Negative.    Neurological:  Negative for numbness and tingling.      Objective:      Physical Exam  Vitals and nursing note reviewed.   HENT:      Head: Normocephalic.   Eyes:      Conjunctiva/sclera: Conjunctivae normal.   Musculoskeletal:      Cervical back: Normal range of motion.      Lumbar back: Tenderness present. No swelling or deformity. Normal range of motion.        Back:    Skin:     General: Skin is warm.      Capillary Refill: Capillary refill takes less than 2 seconds.   Neurological:      General: No focal deficit present.      Mental Status: She is alert and oriented to person, place, and time.      Deep Tendon Reflexes: Reflexes are normal and symmetric.   Psychiatric:          Attention and Perception: Attention normal.         Mood and Affect: Mood and affect normal.         Speech: Speech normal.         Behavior: Behavior normal. Behavior is cooperative.       Assessment:       1. Encounter related to worker's compensation claim    2. Sprain of ligaments of lumbar spine, subsequent encounter    3. Chronic bilateral low back pain without sciatica    4. Degeneration of lumbar intervertebral disc    5. Spondylosis of thoracic region without myelopathy or radiculopathy    6. Chronic cervical pain          Plan:         Today there is some increased pain more centrally than in the past but she continues to have pain to the right lower lumbar region.  Range of motion is still intact.  Encouraged her to perform her home exercises to help with her recent aggravation.  Should her symptoms persist beyond 1-2 more weeks then she might benefit from some physical therapy.  Follow-up in approximately 1 month but sooner if any issues.    Medications Ordered This Encounter   Medications    HYDROcodone-acetaminophen (NORCO)  mg per tablet     Sig: Take 1 tablet by mouth every 6 (six) hours as needed for Pain.     Dispense:  100 tablet     Refill:  0     Quantity prescribed more than 7 day supply? Yes, quantity medically necessary    ibuprofen (ADVIL,MOTRIN) 800 MG tablet     Sig: Take 1 tablet (800 mg total) by mouth 3 (three) times daily. Take medication with food     Dispense:  60 tablet     Refill:  0    LIDOcaine (LIDODERM) 5 %     Sig: Place 1 patch onto the skin once daily. Remove & Discard patch within 12 hours or as directed by MD     Dispense:  30 patch     Refill:  2    tiZANidine (ZANAFLEX) 4 MG tablet     Sig: Take 1 tablet (4 mg total) by mouth nightly as needed (pain/spasm).     Dispense:  30 tablet     Refill:  0            Follow up in about 1 month (around 1/13/2023).

## 2023-01-13 ENCOUNTER — OFFICE VISIT (OUTPATIENT)
Dept: URGENT CARE | Facility: CLINIC | Age: 73
End: 2023-01-13
Payer: OTHER GOVERNMENT

## 2023-01-13 VITALS
WEIGHT: 141 LBS | DIASTOLIC BLOOD PRESSURE: 85 MMHG | TEMPERATURE: 98 F | HEIGHT: 62 IN | OXYGEN SATURATION: 96 % | HEART RATE: 67 BPM | SYSTOLIC BLOOD PRESSURE: 181 MMHG | BODY MASS INDEX: 25.95 KG/M2 | RESPIRATION RATE: 17 BRPM

## 2023-01-13 DIAGNOSIS — G89.29 CHRONIC CERVICAL PAIN: ICD-10-CM

## 2023-01-13 DIAGNOSIS — S33.5XXD SPRAIN OF LIGAMENTS OF LUMBAR SPINE, SUBSEQUENT ENCOUNTER: ICD-10-CM

## 2023-01-13 DIAGNOSIS — M54.2 CHRONIC CERVICAL PAIN: ICD-10-CM

## 2023-01-13 DIAGNOSIS — M54.50 CHRONIC BILATERAL LOW BACK PAIN WITHOUT SCIATICA: ICD-10-CM

## 2023-01-13 DIAGNOSIS — M51.36 DEGENERATION OF LUMBAR INTERVERTEBRAL DISC: ICD-10-CM

## 2023-01-13 DIAGNOSIS — M47.814 SPONDYLOSIS OF THORACIC REGION WITHOUT MYELOPATHY OR RADICULOPATHY: ICD-10-CM

## 2023-01-13 DIAGNOSIS — Z02.6 ENCOUNTER RELATED TO WORKER'S COMPENSATION CLAIM: Primary | ICD-10-CM

## 2023-01-13 DIAGNOSIS — G89.29 CHRONIC BILATERAL LOW BACK PAIN WITHOUT SCIATICA: ICD-10-CM

## 2023-01-13 PROCEDURE — 99214 OFFICE O/P EST MOD 30 MIN: CPT | Mod: S$GLB,,,

## 2023-01-13 PROCEDURE — 99214 PR OFFICE/OUTPT VISIT, EST, LEVL IV, 30-39 MIN: ICD-10-PCS | Mod: S$GLB,,,

## 2023-01-13 RX ORDER — IBUPROFEN 800 MG/1
800 TABLET ORAL 3 TIMES DAILY
Qty: 60 TABLET | Refills: 0 | Status: SHIPPED | OUTPATIENT
Start: 2023-01-13 | End: 2023-06-07 | Stop reason: SDUPTHER

## 2023-01-13 RX ORDER — HYDROCODONE BITARTRATE AND ACETAMINOPHEN 10; 325 MG/1; MG/1
1 TABLET ORAL EVERY 6 HOURS PRN
Qty: 100 TABLET | Refills: 0 | Status: SHIPPED | OUTPATIENT
Start: 2023-01-13 | End: 2023-02-16 | Stop reason: SDUPTHER

## 2023-01-13 NOTE — LETTER
[facility]  [address] [address]  Phone: [phone]  Fax: [phone]  [facility]: [id number]    Pt Name: Sasha John Date: 03/15/2003   Employee ID:[id number] Date of First Treatment: 01/13/2023   Company: [employer]      Appointment Time: 09:00 AM Arrived: 9:13 AM   Provider: Mendez Montes, DO Time Out:10:30 AM     Office Treatment:   1. Encounter related to worker's compensation claim    2. Chronic bilateral low back pain without sciatica    3. Degeneration of lumbar intervertebral disc    4. Sprain of ligaments of lumbar spine, subsequent encounter    5. Spondylosis of thoracic region without myelopathy or radiculopathy    6. Chronic cervical pain      Medications Ordered This Encounter   Medications    HYDROcodone-acetaminophen (NORCO)  mg per tablet    ibuprofen (ADVIL,MOTRIN) 800 MG tablet                 Return Appointment: 2/13/2023 at 9:00 AM  VIJAY

## 2023-01-13 NOTE — PROGRESS NOTES
Subjective:       Patient ID: Sasha Michelle is a 72 y.o. female.    Chief Complaint: Back Pain    Follow-up visit for chronic lower back pain.  Medication continue to help control her lower back symptoms but still occasionally experiences aggravation of her symptoms.     RV Back Pain [employer] (DOI 3/15/01). She is here to follow up on back pain. She states that her pain level today is 0/10 due to taking her medication but when she doesn't her pain can be 8-9/10. She had surgery on her left had since her last visit.     EC    Back Pain  Pertinent negatives include no numbness.     Constitution: Positive for activity change. Negative for generalized weakness.   HENT: Negative.     Neck: neck negative.   Cardiovascular: Negative.    Eyes: Negative.    Respiratory: Negative.     Gastrointestinal: Negative.    Endocrine: negative.   Genitourinary: Negative.    Musculoskeletal:  Positive for pain and back pain.   Skin: Negative.    Neurological:  Negative for loss of balance, numbness and tingling.   Psychiatric/Behavioral:  Positive for sleep disturbance.       Objective:      Physical Exam  Vitals and nursing note reviewed.   Constitutional:       General: She is not in acute distress.  HENT:      Head: Normocephalic.   Eyes:      General: Lids are normal.      Conjunctiva/sclera: Conjunctivae normal.   Musculoskeletal:      Cervical back: No pain with movement.      Lumbar back: Tenderness present. No swelling or deformity. Normal range of motion.        Back:       Comments: No gross deformity noted to the lumbar spine.  Areas on palpation to the L5-S1 centrally and just to the right.  Some discomfort to same area with range of motion however she has full range of motion all planes.  Normal gait she is able climb on and off the examination table without difficulty or assistance.   Skin:     General: Skin is warm.      Capillary Refill: Capillary refill takes less than 2 seconds.   Neurological:      General: No focal  deficit present.      Mental Status: She is alert and oriented to person, place, and time.   Psychiatric:         Attention and Perception: Attention normal.         Mood and Affect: Mood and affect normal.         Speech: Speech normal.         Behavior: Behavior normal. Behavior is cooperative.       Assessment:       1. Encounter related to worker's compensation claim    2. Chronic bilateral low back pain without sciatica    3. Degeneration of lumbar intervertebral disc    4. Sprain of ligaments of lumbar spine, subsequent encounter    5. Spondylosis of thoracic region without myelopathy or radiculopathy    6. Chronic cervical pain          Plan:         Medications Ordered This Encounter   Medications    HYDROcodone-acetaminophen (NORCO)  mg per tablet     Sig: Take 1 tablet by mouth every 6 (six) hours as needed for Pain.     Dispense:  100 tablet     Refill:  0     Quantity prescribed more than 7 day supply? Yes, quantity medically necessary    ibuprofen (ADVIL,MOTRIN) 800 MG tablet     Sig: Take 1 tablet (800 mg total) by mouth 3 (three) times daily. Take medication with food     Dispense:  60 tablet     Refill:  0     Treatment regimen continues to help control her lower back pain.  She is requesting refill of ibuprofen and pain medication.  She is working with her physician on weight loss to help with her blood pressure and her lower back pain.  She will be adding more regular walking activities to her home exercise program.  Reassess in approximately         Follow up in about 1 month (around 2/13/2023).

## 2023-02-16 ENCOUNTER — OFFICE VISIT (OUTPATIENT)
Dept: URGENT CARE | Facility: CLINIC | Age: 73
End: 2023-02-16
Payer: OTHER GOVERNMENT

## 2023-02-16 VITALS
OXYGEN SATURATION: 97 % | DIASTOLIC BLOOD PRESSURE: 87 MMHG | SYSTOLIC BLOOD PRESSURE: 182 MMHG | HEIGHT: 62 IN | BODY MASS INDEX: 25.95 KG/M2 | TEMPERATURE: 98 F | WEIGHT: 141 LBS | HEART RATE: 59 BPM | RESPIRATION RATE: 18 BRPM

## 2023-02-16 DIAGNOSIS — M47.814 SPONDYLOSIS OF THORACIC REGION WITHOUT MYELOPATHY OR RADICULOPATHY: ICD-10-CM

## 2023-02-16 DIAGNOSIS — S33.5XXD SPRAIN OF LIGAMENTS OF LUMBAR SPINE, SUBSEQUENT ENCOUNTER: ICD-10-CM

## 2023-02-16 DIAGNOSIS — M51.36 DEGENERATION OF LUMBAR INTERVERTEBRAL DISC: ICD-10-CM

## 2023-02-16 DIAGNOSIS — G89.29 CHRONIC BILATERAL LOW BACK PAIN WITHOUT SCIATICA: ICD-10-CM

## 2023-02-16 DIAGNOSIS — Z02.6 ENCOUNTER RELATED TO WORKER'S COMPENSATION CLAIM: Primary | ICD-10-CM

## 2023-02-16 DIAGNOSIS — M54.50 CHRONIC BILATERAL LOW BACK PAIN WITHOUT SCIATICA: ICD-10-CM

## 2023-02-16 DIAGNOSIS — G89.29 CHRONIC CERVICAL PAIN: ICD-10-CM

## 2023-02-16 DIAGNOSIS — M54.2 CHRONIC CERVICAL PAIN: ICD-10-CM

## 2023-02-16 PROCEDURE — 99214 PR OFFICE/OUTPT VISIT, EST, LEVL IV, 30-39 MIN: ICD-10-PCS | Mod: S$GLB,,,

## 2023-02-16 PROCEDURE — 99214 OFFICE O/P EST MOD 30 MIN: CPT | Mod: S$GLB,,,

## 2023-02-16 RX ORDER — TIZANIDINE 4 MG/1
TABLET ORAL
Qty: 30 TABLET | Refills: 0 | Status: SHIPPED | OUTPATIENT
Start: 2023-02-16 | End: 2023-03-16 | Stop reason: SDUPTHER

## 2023-02-16 RX ORDER — LIDOCAINE 50 MG/G
1 PATCH TOPICAL DAILY
Qty: 30 PATCH | Refills: 2 | Status: SHIPPED | OUTPATIENT
Start: 2023-02-16 | End: 2023-04-14 | Stop reason: SDUPTHER

## 2023-02-16 RX ORDER — HYDROCODONE BITARTRATE AND ACETAMINOPHEN 10; 325 MG/1; MG/1
1 TABLET ORAL EVERY 6 HOURS PRN
Qty: 100 TABLET | Refills: 0 | Status: SHIPPED | OUTPATIENT
Start: 2023-02-16 | End: 2023-03-16 | Stop reason: SDUPTHER

## 2023-02-16 NOTE — LETTER
[facility]  [address] [address]  Phone: [phone]  Fax: [phone]  [facility]: [id number]    Pt Name: Sasha John Date: 03/15/2003   Employee ID: [id number] Date of Treatment: 02/16/2023   Company: [employer]      Appointment Time: 08:45 AM Arrived: 8:50 AM   Provider: Mendez Montes, DO Time Out: 10:30 AM     Office Treatment:   1. Encounter related to worker's compensation claim    2. Sprain of ligaments of lumbar spine, subsequent encounter    3. Chronic bilateral low back pain without sciatica    4. Degeneration of lumbar intervertebral disc    5. Spondylosis of thoracic region without myelopathy or radiculopathy    6. Chronic cervical pain      Medications Ordered This Encounter   Medications    HYDROcodone-acetaminophen (NORCO)  mg per tablet    LIDOcaine (LIDODERM) 5 %    tiZANidine (ZANAFLEX) 4 MG tablet                 Return Appointment: 3/16/2023 at 9:00 AM

## 2023-02-16 NOTE — PROGRESS NOTES
Subjective:       Patient ID: Sasha Michelle is a 72 y.o. female.    Chief Complaint: Back Pain    Follow-up evaluation of chronic lower back pain.  Symptoms have been well controlled with the use of lidocaine patches, ibuprofen, Norco, and muscle relaxers.  She does have intermittent flare-ups that are short-lived.  Overall, her symptoms are unchanged since her last evaluation.     Follow-up of Back Pain ( DOI 03- ) Worked for [employer] retired. Pain score 6/10 with complaints of Constant Burning pain, Intermittent Sharp pain in RT Hip, No stiffness, No numbness/tingling, No pain w/walking, ROM good. Taking Norco, Tizanidine. SH    Back Pain  Pertinent negatives include no numbness.   Constitution: Negative.   HENT: Negative.     Neck: neck negative.   Cardiovascular: Negative.    Eyes: Negative.    Respiratory: Negative.     Gastrointestinal: Negative.    Endocrine: negative.   Genitourinary: Negative.    Musculoskeletal:  Positive for joint pain and back pain. Negative for trauma, joint swelling, abnormal ROM of joint, muscle cramps and muscle ache.   Skin: Negative.    Neurological: Negative.  Negative for numbness and tingling.      Objective:      Physical Exam  Vitals and nursing note reviewed.   Constitutional:       General: She is not in acute distress.  HENT:      Head: Normocephalic.   Eyes:      General: Lids are normal.      Conjunctiva/sclera: Conjunctivae normal.   Musculoskeletal:      Cervical back: No pain with movement.      Lumbar back: Tenderness present. No swelling or deformity. Normal range of motion.        Back:       Comments: No gross deformity noted to lumbar spine.  Mild tenderness on palpation at the L5-S1 level and just to the right.  She describes some discomfort but not pain during range of motion assessment, but full range of motion otherwise.  Painless squat.  She is able to climb on and off the examination table without difficulty or assistance.  Symmetrical strength and  reflexes to lower extremities bilaterally   Skin:     General: Skin is warm.      Capillary Refill: Capillary refill takes less than 2 seconds.   Neurological:      General: No focal deficit present.      Mental Status: She is alert.   Psychiatric:         Attention and Perception: Attention normal.         Mood and Affect: Mood and affect normal.         Speech: Speech normal.         Behavior: Behavior normal. Behavior is cooperative.       Assessment:       1. Encounter related to worker's compensation claim    2. Sprain of ligaments of lumbar spine, subsequent encounter    3. Chronic bilateral low back pain without sciatica    4. Degeneration of lumbar intervertebral disc    5. Spondylosis of thoracic region without myelopathy or radiculopathy    6. Chronic cervical pain          Plan:         Medications Ordered This Encounter   Medications    HYDROcodone-acetaminophen (NORCO)  mg per tablet     Sig: Take 1 tablet by mouth every 6 (six) hours as needed for Pain.     Dispense:  100 tablet     Refill:  0     Quantity prescribed more than 7 day supply? Yes, quantity medically necessary    LIDOcaine (LIDODERM) 5 %     Sig: Place 1 patch onto the skin once daily. Remove & Discard patch within 12 hours or as directed by MD     Dispense:  30 patch     Refill:  2    tiZANidine (ZANAFLEX) 4 MG tablet     Si tablet QHS PRN pain/spasm     Dispense:  30 tablet     Refill:  0     Lower back condition is stable with the use of the prescribed medications.  Refills we provided today and she was advised to follow up in approximately 4 weeks.  He has been noted that her blood pressure has been elevated over the past couple of visits and she is working with her primary care doctor to improve her blood pressure readings.  I did discuss that it would be important also mention any possibility of sleep apnea that may be contributing to her elevated blood pressure.  She is retired from the [employer] service and therefore no  limitations are listed here.         Follow up in about 4 weeks (around 3/16/2023).

## 2023-03-16 ENCOUNTER — OFFICE VISIT (OUTPATIENT)
Dept: URGENT CARE | Facility: CLINIC | Age: 73
End: 2023-03-16
Payer: OTHER GOVERNMENT

## 2023-03-16 VITALS
OXYGEN SATURATION: 95 % | TEMPERATURE: 98 F | HEART RATE: 62 BPM | DIASTOLIC BLOOD PRESSURE: 62 MMHG | SYSTOLIC BLOOD PRESSURE: 131 MMHG

## 2023-03-16 DIAGNOSIS — S33.5XXD SPRAIN OF LIGAMENTS OF LUMBAR SPINE, SUBSEQUENT ENCOUNTER: ICD-10-CM

## 2023-03-16 DIAGNOSIS — Z02.6 ENCOUNTER RELATED TO WORKER'S COMPENSATION CLAIM: Primary | ICD-10-CM

## 2023-03-16 DIAGNOSIS — M54.50 CHRONIC BILATERAL LOW BACK PAIN WITHOUT SCIATICA: ICD-10-CM

## 2023-03-16 DIAGNOSIS — G89.29 CHRONIC BILATERAL LOW BACK PAIN WITHOUT SCIATICA: ICD-10-CM

## 2023-03-16 PROCEDURE — 99213 OFFICE O/P EST LOW 20 MIN: CPT | Mod: S$GLB,,,

## 2023-03-16 PROCEDURE — 99213 PR OFFICE/OUTPT VISIT, EST, LEVL III, 20-29 MIN: ICD-10-PCS | Mod: S$GLB,,,

## 2023-03-16 RX ORDER — TIZANIDINE 4 MG/1
TABLET ORAL
Qty: 30 TABLET | Refills: 0 | Status: SHIPPED | OUTPATIENT
Start: 2023-03-16 | End: 2023-04-14 | Stop reason: SDUPTHER

## 2023-03-16 RX ORDER — HYDROCODONE BITARTRATE AND ACETAMINOPHEN 10; 325 MG/1; MG/1
1 TABLET ORAL EVERY 6 HOURS PRN
Qty: 100 TABLET | Refills: 0 | Status: SHIPPED | OUTPATIENT
Start: 2023-03-16 | End: 2023-04-14 | Stop reason: SDUPTHER

## 2023-03-16 NOTE — LETTER
[facility]  [address] [address]  Phone: [phone]  Fax: [phone]  [facility]: [id number]    Pt Name: Sasha Michelle  Injury Date: 03/15/2003   Employee ID: [id number] Date of Treatment: 03/16/2023   Company: [employer]      Appointment Time: 09:00 AM Arrived: 11:30 AM    Provider: Mendez Montes, DO Time Out: 12:59 PM     Office Treatment:   1. Encounter related to worker's compensation claim    2. Sprain of ligaments of lumbar spine, subsequent encounter    3. Chronic bilateral low back pain without sciatica      Medications Ordered This Encounter   Medications    HYDROcodone-acetaminophen (NORCO)  mg per tablet    tiZANidine (ZANAFLEX) 4 MG tablet                 Return Appointment: 4/14/2023 at 9:00 AM [facility]

## 2023-03-16 NOTE — PROGRESS NOTES
Subjective:       Patient ID: Sasha Michelle is a 72 y.o. female.    Chief Complaint: Back Pain (Back pain)    Her chronic lower back pain.  She is recently had some increased lower back pain due to increased assistance with her 's ADLs.  He recently had surgery for his shoulder which required her to 2 more bending and stooping type of activities.  She did indicate as a result of his surgery she is not been performing her home exercises as regularly.  Now that he is home and has a home health nurse for assistance she believes she can now return to her home exercises start walking again.  She does request a refill of her muscle relaxer and hydrocodone medication.    KW  Patient's place of employment - [employer]  Patient's job title - [employer]  Date of Injury - 03/15/2001  Body part injured - BACK  Current work status per last visit - LIGHT  Improved, same, or worse - SAME  Pain Scale right now (1-10) -  10    Back Pain  This is a recurrent problem. The current episode started more than 1 year ago. The problem occurs constantly. The problem is unchanged. The quality of the pain is described as aching, stabbing and shooting. The pain is at a severity of 10/10. The pain is moderate. The pain is The same all the time. The symptoms are aggravated by standing and sitting. Stiffness is present All day. She has tried heat and muscle relaxant for the symptoms. The treatment provided mild relief.     Constitution: Negative.   Neck: neck negative.   Cardiovascular: Negative.    Eyes: Negative.    Respiratory: Negative.     Gastrointestinal: Negative.    Endocrine: negative.   Genitourinary: Negative.    Musculoskeletal:  Positive for pain and back pain.   Skin: Negative.    Neurological: Negative.       Objective:      Physical Exam  Vitals and nursing note reviewed.   Constitutional:       General: She is not in acute distress.  HENT:      Head: Normocephalic.   Eyes:      Conjunctiva/sclera: Conjunctivae normal.    Musculoskeletal:      Cervical back: No pain with movement.      Lumbar back: Tenderness present. No swelling or deformity. Decreased range of motion.        Back:       Comments: No gross deformity noted to the lumbar spine.  She has a broader area of tenderness on palpation today with decreased flexion when compared to past visits.  Left lower extremity strength is slightly decreased when compared to the right.  Symmetrical lower extremity reflexes bilaterally.   Skin:     General: Skin is warm.      Capillary Refill: Capillary refill takes less than 2 seconds.   Neurological:      General: No focal deficit present.      Mental Status: She is alert and oriented to person, place, and time.   Psychiatric:         Attention and Perception: Attention normal.         Mood and Affect: Mood and affect normal.         Speech: Speech normal.         Behavior: Behavior normal. Behavior is cooperative.       Assessment:       1. Encounter related to worker's compensation claim    2. Sprain of ligaments of lumbar spine, subsequent encounter    3. Chronic bilateral low back pain without sciatica          Plan:         Medications Ordered This Encounter   Medications    HYDROcodone-acetaminophen (NORCO)  mg per tablet     Sig: Take 1 tablet by mouth every 6 (six) hours as needed for Pain.     Dispense:  100 tablet     Refill:  0     Quantity prescribed more than 7 day supply? Yes, quantity medically necessary    tiZANidine (ZANAFLEX) 4 MG tablet     Si tablet QHS PRN pain/spasm     Dispense:  30 tablet     Refill:  0     Although she is had some increased lower back symptoms as result of assisting her  during postsurgical recovery, I have asked her to continue the current medication regimen and reinitiate home exercises soon as possible.  She acknowledges understanding of the importance of her home exercises to help control her chronic lower back condition.  Refills of her medication have been provided today  and I have asked her to follow up in approximately 1 month.           Follow up in about 29 days (around 4/14/2023).

## 2023-04-14 ENCOUNTER — OFFICE VISIT (OUTPATIENT)
Dept: URGENT CARE | Facility: CLINIC | Age: 73
End: 2023-04-14
Payer: OTHER GOVERNMENT

## 2023-04-14 VITALS
WEIGHT: 148 LBS | RESPIRATION RATE: 16 BRPM | HEART RATE: 60 BPM | TEMPERATURE: 98 F | OXYGEN SATURATION: 100 % | BODY MASS INDEX: 29.06 KG/M2 | HEIGHT: 60 IN | SYSTOLIC BLOOD PRESSURE: 170 MMHG | DIASTOLIC BLOOD PRESSURE: 72 MMHG

## 2023-04-14 DIAGNOSIS — M47.814 SPONDYLOSIS OF THORACIC REGION WITHOUT MYELOPATHY OR RADICULOPATHY: ICD-10-CM

## 2023-04-14 DIAGNOSIS — M51.36 DEGENERATION OF LUMBAR INTERVERTEBRAL DISC: ICD-10-CM

## 2023-04-14 DIAGNOSIS — S33.5XXD SPRAIN OF LIGAMENTS OF LUMBAR SPINE, SUBSEQUENT ENCOUNTER: ICD-10-CM

## 2023-04-14 DIAGNOSIS — Z02.6 ENCOUNTER RELATED TO WORKER'S COMPENSATION CLAIM: Primary | ICD-10-CM

## 2023-04-14 DIAGNOSIS — M54.2 CHRONIC CERVICAL PAIN: ICD-10-CM

## 2023-04-14 DIAGNOSIS — G89.29 CHRONIC CERVICAL PAIN: ICD-10-CM

## 2023-04-14 DIAGNOSIS — M54.50 CHRONIC BILATERAL LOW BACK PAIN WITHOUT SCIATICA: ICD-10-CM

## 2023-04-14 DIAGNOSIS — G89.29 CHRONIC BILATERAL LOW BACK PAIN WITHOUT SCIATICA: ICD-10-CM

## 2023-04-14 PROCEDURE — 99213 PR OFFICE/OUTPT VISIT, EST, LEVL III, 20-29 MIN: ICD-10-PCS | Mod: S$GLB,,,

## 2023-04-14 PROCEDURE — 99213 OFFICE O/P EST LOW 20 MIN: CPT | Mod: S$GLB,,,

## 2023-04-14 RX ORDER — TIZANIDINE 4 MG/1
TABLET ORAL
Qty: 30 TABLET | Refills: 0 | Status: SHIPPED | OUTPATIENT
Start: 2023-04-14 | End: 2023-05-12 | Stop reason: SDUPTHER

## 2023-04-14 RX ORDER — HYDROCODONE BITARTRATE AND ACETAMINOPHEN 10; 325 MG/1; MG/1
1 TABLET ORAL EVERY 6 HOURS PRN
Qty: 100 TABLET | Refills: 0 | Status: SHIPPED | OUTPATIENT
Start: 2023-04-14 | End: 2023-05-12 | Stop reason: SDUPTHER

## 2023-04-14 RX ORDER — LIDOCAINE 50 MG/G
1 PATCH TOPICAL DAILY
Qty: 30 PATCH | Refills: 2 | Status: SHIPPED | OUTPATIENT
Start: 2023-04-14 | End: 2023-06-07 | Stop reason: SDUPTHER

## 2023-04-14 NOTE — LETTER
[facility]  [address] [address]  Phone: [phone]  Fax: [phone]  [facility]: [id number]    Pt Name: Sasha Michelle  Injury Date: 03/15/2001   Employee ID: [id number] Date of Treatment: 04/14/2023   Company: [employer]      Appointment Time: 09:00 AM Arrived: 8:26 AM    Provider: Mendez Montes, DO Time Out: 9:32 AM      Office Treatment:   1. Encounter related to worker's compensation claim    2. Chronic bilateral low back pain without sciatica    3. Degeneration of lumbar intervertebral disc    4. Sprain of ligaments of lumbar spine, subsequent encounter    5. Spondylosis of thoracic region without myelopathy or radiculopathy    6. Chronic cervical pain      Medications Ordered This Encounter   Medications    HYDROcodone-acetaminophen (NORCO)  mg per tablet    LIDOcaine (LIDODERM) 5 %    tiZANidine (ZANAFLEX) 4 MG tablet                 Return Appointment: 5/12/2023 at 9:00 AM [facility]

## 2023-04-14 NOTE — PROGRESS NOTES
Subjective:      Patient ID: Sasha Michelle is a 72 y.o. female.    Chief Complaint: Back Pain (Lower)    Follow-up evaluation for chronic lower back pain.  He has been well controlled with the use of oral medications.  Periodic aggravation of her symptoms but never more than a couple days.  She needs refills of her hydrocodone, lidocaine patches, and bedtime Flexeril.  She recently traveled be a car for a protracted periods of time that required approximally 2 days for recovery.  However, medications helped in the process.    Patient's place of employment - [employer]  Patient's job title - RETIRED  Date of Injury - 3/15/2001  Body part injured - Low back   Current work status per last visit - Retired  Improved, same, or worse - Same  Pain Scale right now (1-10) -  8    Back Pain  This is a recurrent problem. The current episode started more than 1 year ago. The problem occurs constantly. The problem is unchanged. The quality of the pain is described as aching, stabbing and shooting. The pain is at a severity of 10/10. The pain is moderate. The pain is The same all the time. The symptoms are aggravated by standing and sitting. Stiffness is present All day. Pertinent negatives include no numbness. She has tried heat and muscle relaxant for the symptoms. The treatment provided mild relief.     Constitution: Negative.   Neck: neck negative.   Cardiovascular: Negative.    Eyes: Negative.    Respiratory: Negative.     Gastrointestinal: Negative.    Endocrine: negative.   Genitourinary: Negative.    Musculoskeletal:  Positive for pain, back pain and muscle cramps. Negative for joint pain, joint swelling, abnormal ROM of joint and muscle ache.   Skin: Negative.  Negative for color change, wound, abrasion, puncture wound, erythema and bruising.   Neurological: Negative.  Negative for numbness and tingling.   Objective:     Physical Exam  Vitals and nursing note reviewed.   Constitutional:       General: She is not in acute  distress.  HENT:      Head: Normocephalic.   Eyes:      Conjunctiva/sclera: Conjunctivae normal.   Musculoskeletal:      Cervical back: No pain with movement.      Lumbar back: Tenderness present. No swelling, deformity or spasms. Normal range of motion.        Back:       Comments: No gross deformity noted to the lumbar spine.  Tenderness on palpation to the L5-S1 level and to the right.  Slight tenderness at the same level and to the left.  She describes some discomfort during range of motion assessment but has full range of her lumbar spine otherwise.  Symmetrical strength and reflexes to lower extremities bilaterally.   Skin:     General: Skin is warm.      Capillary Refill: Capillary refill takes less than 2 seconds.      Findings: No erythema.   Neurological:      Mental Status: She is alert and oriented to person, place, and time.      Gait: Gait is intact.   Psychiatric:         Attention and Perception: Attention normal.         Mood and Affect: Mood and affect normal.         Speech: Speech normal.         Behavior: Behavior normal. Behavior is cooperative.      Assessment:      1. Encounter related to worker's compensation claim    2. Chronic bilateral low back pain without sciatica    3. Degeneration of lumbar intervertebral disc    4. Sprain of ligaments of lumbar spine, subsequent encounter    5. Spondylosis of thoracic region without myelopathy or radiculopathy    6. Chronic cervical pain      Plan:       Medications Ordered This Encounter   Medications    HYDROcodone-acetaminophen (NORCO)  mg per tablet     Sig: Take 1 tablet by mouth every 6 (six) hours as needed for Pain.     Dispense:  100 tablet     Refill:  0     Quantity prescribed more than 7 day supply? Yes, quantity medically necessary    LIDOcaine (LIDODERM) 5 %     Sig: Place 1 patch onto the skin once daily. Remove & Discard patch within 12 hours or as directed by MD     Dispense:  30 patch     Refill:  2    tiZANidine (ZANAFLEX) 4  MG tablet     Si tablet QHS PRN pain/spasm     Dispense:  30 tablet     Refill:  0     Despite periodic aggravation of lower back symptoms, clinically she is stable with the use of the current medication regimen.  Refills have been provided today and reassess her status in approximately 4 weeks.         Follow up in about 4 weeks (around 2023).

## 2023-05-12 ENCOUNTER — OFFICE VISIT (OUTPATIENT)
Dept: URGENT CARE | Facility: CLINIC | Age: 73
End: 2023-05-12
Payer: OTHER GOVERNMENT

## 2023-05-12 VITALS
BODY MASS INDEX: 29.06 KG/M2 | TEMPERATURE: 98 F | DIASTOLIC BLOOD PRESSURE: 77 MMHG | WEIGHT: 148 LBS | HEART RATE: 64 BPM | SYSTOLIC BLOOD PRESSURE: 164 MMHG | HEIGHT: 60 IN | OXYGEN SATURATION: 98 %

## 2023-05-12 DIAGNOSIS — Z02.6 ENCOUNTER RELATED TO WORKER'S COMPENSATION CLAIM: Primary | ICD-10-CM

## 2023-05-12 DIAGNOSIS — G89.29 CHRONIC BILATERAL LOW BACK PAIN WITHOUT SCIATICA: ICD-10-CM

## 2023-05-12 DIAGNOSIS — M54.50 CHRONIC BILATERAL LOW BACK PAIN WITHOUT SCIATICA: ICD-10-CM

## 2023-05-12 DIAGNOSIS — M51.36 DEGENERATION OF LUMBAR INTERVERTEBRAL DISC: ICD-10-CM

## 2023-05-12 DIAGNOSIS — S33.5XXD SPRAIN OF LIGAMENTS OF LUMBAR SPINE, SUBSEQUENT ENCOUNTER: ICD-10-CM

## 2023-05-12 PROCEDURE — 99213 OFFICE O/P EST LOW 20 MIN: CPT | Mod: S$GLB,,,

## 2023-05-12 PROCEDURE — 99213 PR OFFICE/OUTPT VISIT, EST, LEVL III, 20-29 MIN: ICD-10-PCS | Mod: S$GLB,,,

## 2023-05-12 RX ORDER — HYDROCODONE BITARTRATE AND ACETAMINOPHEN 10; 325 MG/1; MG/1
1 TABLET ORAL EVERY 6 HOURS PRN
Qty: 100 TABLET | Refills: 0 | Status: SHIPPED | OUTPATIENT
Start: 2023-05-12 | End: 2023-06-07 | Stop reason: SDUPTHER

## 2023-05-12 RX ORDER — TIZANIDINE 4 MG/1
TABLET ORAL
Qty: 30 TABLET | Refills: 0 | Status: SHIPPED | OUTPATIENT
Start: 2023-05-12 | End: 2023-06-07 | Stop reason: SDUPTHER

## 2023-05-12 NOTE — PROGRESS NOTES
Subjective:      Patient ID: Sasha Michelle is a 72 y.o. female.    Chief Complaint: Back Injury (Lower )    Follow-up evaluation for chronic lower back pain.  She is been stable on her medication regimen with only occasional flare-up of her lower back pain.  She is working with her primary care doctor to lose some weight which she believes will be helpful in reducing her back pain.  In addition she and her  are now attending the gym performing light exercises to improve her overall endurance.  She denies any worsening of her condition and needs refill of her hydrocodone and p.r.n. bedtime muscle relaxer.  She denies any side effects from the use of these medications.  She is been retired for many years and she returns on a monthly basis for re-evaluation to ensure stability of her chronic medical condition.    Patient's place of employment - [employer]  Patient's job title - Retired  Date of Injury - 3/15/2001  Body part injured - Lower Back   Current work status per last visit - Retired  Improved, same, or worse - Same  Pain Scale right now (1-10) - 1/10      Constitution: Positive for activity change. Negative for generalized weakness.   Musculoskeletal:  Positive for back pain. Negative for pain, abnormal ROM of joint, muscle cramps and muscle ache.   Neurological:  Negative for loss of balance, tingling and seizures.   Psychiatric/Behavioral:  Negative for sleep disturbance.    Objective:     Physical Exam     Physical Exam  Vitals and nursing note reviewed.   Constitutional:       General: She is not in acute distress.  HENT:      Head: Normocephalic.   Eyes:      Conjunctiva/sclera: Conjunctivae normal.   Musculoskeletal:      Cervical back: No pain with movement.      Lumbar back: Tenderness present. No swelling, deformity or spasms. Normal range of motion.        Back:       Comments: No gross deformity noted to the lumbar spine.  Tenderness on palpation to the L5-S1 level and to the right.  Slight  tenderness at the same level and to the left.  She describes some discomfort during range of motion assessment but has full range of her lumbar spine otherwise.  Symmetrical strength and reflexes to lower extremities bilaterally.   Skin:     General: Skin is warm.      Capillary Refill: Capillary refill takes less than 2 seconds.      Findings: No erythema.   Neurological:      Mental Status: She is alert and oriented to person, place, and time.      Gait: Gait is intact.   Psychiatric:         Attention and Perception: Attention normal.         Mood and Affect: Mood and affect normal.         Speech: Speech normal.         Behavior: Behavior normal. Behavior is cooperative.     Assessment:      1. Encounter related to worker's compensation claim    2. Chronic bilateral low back pain without sciatica    3. Degeneration of lumbar intervertebral disc    4. Sprain of ligaments of lumbar spine, subsequent encounter      Plan:     I spent a total of 20 minutes on the day of the visit.This includes face to face time and non-face to face time preparing to see the patient (eg, review of tests), obtaining and/or reviewing separately obtained history, documenting clinical information in the electronic or other health record, independently interpreting results and communicating results to the patient/family/caregiver, or care coordinator.     Chronic lumbar injury is stable with the prescribed medications.  Refill of her hydrocodone and Zanaflex will be provided today.  I encouraged her to continue with her home exercises    Medications Ordered This Encounter   Medications    HYDROcodone-acetaminophen (NORCO)  mg per tablet     Sig: Take 1 tablet by mouth every 6 (six) hours as needed for Pain.     Dispense:  100 tablet     Refill:  0     Quantity prescribed more than 7 day supply? Yes, quantity medically necessary    tiZANidine (ZANAFLEX) 4 MG tablet     Si tablet QHS PRN pain/spasm     Dispense:  30 tablet      Refill:  0            Follow up in about 4 weeks (around 6/9/2023).

## 2023-05-12 NOTE — LETTER
[facility]  [address] [address]  Phone: [phone]  Fax: [phone]  [facility]: [id number]    Pt Name: Sasha Michelle  Injury Date: 03/15/2001   Employee ID: [id number] Date of Treatment: 05/12/2023   Company: [employer]      Appointment Time: 09:00 AM Arrived: 8:57 AM    Provider: Mendez Montes, DO Time Out: 10:08 AM      Office Treatment:   1. Encounter related to worker's compensation claim    2. Chronic bilateral low back pain without sciatica    3. Degeneration of lumbar intervertebral disc    4. Sprain of ligaments of lumbar spine, subsequent encounter      Medications Ordered This Encounter   Medications    HYDROcodone-acetaminophen (NORCO)  mg per tablet    tiZANidine (ZANAFLEX) 4 MG tablet                 Return Appointment: 6/9/2023 at 8:45 AM [facility]          normal...

## 2023-05-23 ENCOUNTER — PATIENT MESSAGE (OUTPATIENT)
Dept: RESEARCH | Facility: HOSPITAL | Age: 73
End: 2023-05-23
Payer: MEDICARE

## 2023-06-07 ENCOUNTER — OFFICE VISIT (OUTPATIENT)
Dept: URGENT CARE | Facility: CLINIC | Age: 73
End: 2023-06-07
Payer: OTHER GOVERNMENT

## 2023-06-07 VITALS
WEIGHT: 148 LBS | OXYGEN SATURATION: 97 % | TEMPERATURE: 99 F | BODY MASS INDEX: 29.06 KG/M2 | HEIGHT: 60 IN | HEART RATE: 63 BPM | DIASTOLIC BLOOD PRESSURE: 79 MMHG | SYSTOLIC BLOOD PRESSURE: 178 MMHG | RESPIRATION RATE: 16 BRPM

## 2023-06-07 DIAGNOSIS — Z02.6 ENCOUNTER RELATED TO WORKER'S COMPENSATION CLAIM: Primary | ICD-10-CM

## 2023-06-07 DIAGNOSIS — S33.5XXD SPRAIN OF LIGAMENTS OF LUMBAR SPINE, SUBSEQUENT ENCOUNTER: ICD-10-CM

## 2023-06-07 DIAGNOSIS — G89.29 CHRONIC BILATERAL LOW BACK PAIN WITHOUT SCIATICA: ICD-10-CM

## 2023-06-07 DIAGNOSIS — M47.814 SPONDYLOSIS OF THORACIC REGION WITHOUT MYELOPATHY OR RADICULOPATHY: ICD-10-CM

## 2023-06-07 DIAGNOSIS — M54.2 CHRONIC CERVICAL PAIN: ICD-10-CM

## 2023-06-07 DIAGNOSIS — G89.29 CHRONIC CERVICAL PAIN: ICD-10-CM

## 2023-06-07 DIAGNOSIS — M54.50 CHRONIC BILATERAL LOW BACK PAIN WITHOUT SCIATICA: ICD-10-CM

## 2023-06-07 DIAGNOSIS — M51.36 DEGENERATION OF LUMBAR INTERVERTEBRAL DISC: ICD-10-CM

## 2023-06-07 PROCEDURE — 99213 PR OFFICE/OUTPT VISIT, EST, LEVL III, 20-29 MIN: ICD-10-PCS | Mod: S$GLB,,,

## 2023-06-07 PROCEDURE — 99213 OFFICE O/P EST LOW 20 MIN: CPT | Mod: S$GLB,,,

## 2023-06-07 RX ORDER — IBUPROFEN 800 MG/1
800 TABLET ORAL 3 TIMES DAILY
Qty: 60 TABLET | Refills: 0 | Status: SHIPPED | OUTPATIENT
Start: 2023-06-07 | End: 2023-07-05 | Stop reason: SDUPTHER

## 2023-06-07 RX ORDER — HYDROCODONE BITARTRATE AND ACETAMINOPHEN 10; 325 MG/1; MG/1
1 TABLET ORAL EVERY 6 HOURS PRN
Qty: 100 TABLET | Refills: 0 | Status: SHIPPED | OUTPATIENT
Start: 2023-06-07 | End: 2023-07-05 | Stop reason: SDUPTHER

## 2023-06-07 RX ORDER — LIDOCAINE 50 MG/G
1 PATCH TOPICAL DAILY
Qty: 30 PATCH | Refills: 2 | Status: SHIPPED | OUTPATIENT
Start: 2023-06-07 | End: 2023-07-05 | Stop reason: SDUPTHER

## 2023-06-07 RX ORDER — TIZANIDINE 4 MG/1
TABLET ORAL
Qty: 30 TABLET | Refills: 0 | Status: SHIPPED | OUTPATIENT
Start: 2023-06-07 | End: 2023-07-05 | Stop reason: SDUPTHER

## 2023-06-07 NOTE — LETTER
[facility]  [address] [facility] [address]  Phone: [phone]  Fax: [phone]  [facility]: [id number]    Pt Name: Sasha Michelle  Injury Date: 03/15/2001   Employee ID: [id number] Date of Treatment: 06/07/2023   Company: [employer]      Appointment Time: 08:45 AM Arrived: 8:42 AM    Provider: Mendez Montes, DO Time Out: 9:44 AM      Office Treatment:   1. Encounter related to worker's compensation claim    2. Chronic bilateral low back pain without sciatica    3. Degeneration of lumbar intervertebral disc    4. Sprain of ligaments of lumbar spine, subsequent encounter    5. Spondylosis of thoracic region without myelopathy or radiculopathy    6. Chronic cervical pain      Medications Ordered This Encounter   Medications    HYDROcodone-acetaminophen (NORCO)  mg per tablet    ibuprofen (ADVIL,MOTRIN) 800 MG tablet    LIDOcaine (LIDODERM) 5 %    tiZANidine (ZANAFLEX) 4 MG tablet                 Return Appointment: 7/5/2023 at 8:45 AM [facility] [place] [facility]

## 2023-06-07 NOTE — PROGRESS NOTES
Subjective:      Patient ID: Sasha Michelle is a 72 y.o. female.    Chief Complaint: Back Pain    See MA note.  Follow-up evaluation for chronic lower back pain.  She is been stable on medication regimen including ibuprofen, lidocaine patches, hydrocodone, and Zanaflex.  She is requesting refills of these medications.  She and her  have recently started performing light exercises at a local gym.  She has tolerated these activities without any difficulty.  However last weekend she did use a machine that puts some pressure onto her lower back which resulted in increased pain for approximally 2 days.  Today, back pain is back to its baseline but she will not use that particular work out machine again.    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 Negative for generalized weakness.   Musculoskeletal:  Positive for back pain. Negative for pain, abnormal ROM of joint, muscle cramps and muscle ache.   Neurological:  Negative for loss of balance, tingling and seizures.   Psychiatric/Behavioral:  Negative for sleep disturbance.    Objective:     Physical Exam  Vitals and nursing note reviewed.   Constitutional:       General: She is not in acute distress.  HENT:      Head: Normocephalic.   Eyes:      Conjunctiva/sclera: Conjunctivae normal.   Musculoskeletal:      Cervical back: No pain with movement.      Lumbar back: Tenderness present. No swelling, deformity or spasms. Normal range of motion.        Back:       Comments: No gross deformity noted to the lumbar spine.  Tenderness on palpation to the L5-S1 level and to the right.  Slight tenderness at the same level and to the left.  She describes some discomfort during range of motion assessment but has full range of her  lumbar spine otherwise.  Symmetrical strength and reflexes to lower extremities bilaterally.   Skin:     General: Skin is warm.      Capillary Refill: Capillary refill takes less than 2 seconds.      Findings: No erythema.   Neurological:      Mental Status: She is alert and oriented to person, place, and time.      Gait: Gait is intact.   Psychiatric:         Attention and Perception: Attention normal.         Mood and Affect: Mood and affect normal.         Speech: Speech normal.         Behavior: Behavior normal. Behavior is cooperative.     Assessment:      1. Encounter related to worker's compensation claim    2. Chronic bilateral low back pain without sciatica    3. Degeneration of lumbar intervertebral disc    4. Sprain of ligaments of lumbar spine, subsequent encounter    5. Spondylosis of thoracic region without myelopathy or radiculopathy    6. Chronic cervical pain      Plan:   Clinically stable on current medication regimen.  Refills will be provided today.  Follow up in approximately 4 weeks.    I spent a total of 20 minutes on the day of the visit.This includes face to face time and non-face to face time preparing to see the patient (eg, review of tests), obtaining and/or reviewing separately obtained history, documenting clinical information in the electronic or other health record, independently interpreting results and communicating results to the patient/family/caregiver, or care coordinator.     Medications Ordered This Encounter   Medications    HYDROcodone-acetaminophen (NORCO)  mg per tablet     Sig: Take 1 tablet by mouth every 6 (six) hours as needed for Pain.     Dispense:  100 tablet     Refill:  0     Quantity prescribed more than 7 day supply? Yes, quantity medically necessary    ibuprofen (ADVIL,MOTRIN) 800 MG tablet     Sig: Take 1 tablet (800 mg total) by mouth 3 (three) times daily. Take medication with food     Dispense:  60 tablet     Refill:  0    LIDOcaine (LIDODERM) 5 %      Sig: Place 1 patch onto the skin once daily. Remove & Discard patch within 12 hours or as directed by MD     Dispense:  30 patch     Refill:  2    tiZANidine (ZANAFLEX) 4 MG tablet     Si tablet QHS PRN pain/spasm     Dispense:  30 tablet     Refill:  0            Follow up in about 4 weeks (around 2023).

## 2023-07-05 ENCOUNTER — OFFICE VISIT (OUTPATIENT)
Dept: URGENT CARE | Facility: CLINIC | Age: 73
End: 2023-07-05
Payer: OTHER GOVERNMENT

## 2023-07-05 VITALS
DIASTOLIC BLOOD PRESSURE: 73 MMHG | BODY MASS INDEX: 29.06 KG/M2 | RESPIRATION RATE: 16 BRPM | TEMPERATURE: 98 F | WEIGHT: 148 LBS | OXYGEN SATURATION: 99 % | SYSTOLIC BLOOD PRESSURE: 165 MMHG | HEIGHT: 60 IN | HEART RATE: 65 BPM

## 2023-07-05 DIAGNOSIS — G89.29 CHRONIC BILATERAL LOW BACK PAIN WITHOUT SCIATICA: ICD-10-CM

## 2023-07-05 DIAGNOSIS — S33.5XXD SPRAIN OF LIGAMENTS OF LUMBAR SPINE, SUBSEQUENT ENCOUNTER: ICD-10-CM

## 2023-07-05 DIAGNOSIS — M47.814 SPONDYLOSIS OF THORACIC REGION WITHOUT MYELOPATHY OR RADICULOPATHY: ICD-10-CM

## 2023-07-05 DIAGNOSIS — G89.29 CHRONIC CERVICAL PAIN: ICD-10-CM

## 2023-07-05 DIAGNOSIS — M54.50 CHRONIC BILATERAL LOW BACK PAIN WITHOUT SCIATICA: ICD-10-CM

## 2023-07-05 DIAGNOSIS — M54.2 CHRONIC CERVICAL PAIN: ICD-10-CM

## 2023-07-05 DIAGNOSIS — M51.36 DEGENERATION OF LUMBAR INTERVERTEBRAL DISC: ICD-10-CM

## 2023-07-05 DIAGNOSIS — Z02.6 ENCOUNTER RELATED TO WORKER'S COMPENSATION CLAIM: Primary | ICD-10-CM

## 2023-07-05 PROCEDURE — 99213 PR OFFICE/OUTPT VISIT, EST, LEVL III, 20-29 MIN: ICD-10-PCS | Mod: S$GLB,,,

## 2023-07-05 PROCEDURE — 99213 OFFICE O/P EST LOW 20 MIN: CPT | Mod: S$GLB,,,

## 2023-07-05 RX ORDER — IBUPROFEN 800 MG/1
800 TABLET ORAL 3 TIMES DAILY
Qty: 60 TABLET | Refills: 0 | Status: SHIPPED | OUTPATIENT
Start: 2023-07-05 | End: 2023-08-02 | Stop reason: SDUPTHER

## 2023-07-05 RX ORDER — LIDOCAINE 50 MG/G
1 PATCH TOPICAL DAILY
Qty: 30 PATCH | Refills: 2 | Status: SHIPPED | OUTPATIENT
Start: 2023-07-05 | End: 2023-12-27 | Stop reason: SDUPTHER

## 2023-07-05 RX ORDER — HYDROCODONE BITARTRATE AND ACETAMINOPHEN 10; 325 MG/1; MG/1
1 TABLET ORAL EVERY 6 HOURS PRN
Qty: 100 TABLET | Refills: 0 | Status: SHIPPED | OUTPATIENT
Start: 2023-07-05 | End: 2023-08-02 | Stop reason: SDUPTHER

## 2023-07-05 RX ORDER — TIZANIDINE 4 MG/1
TABLET ORAL
Qty: 30 TABLET | Refills: 0 | Status: SHIPPED | OUTPATIENT
Start: 2023-07-05 | End: 2023-08-02 | Stop reason: SDUPTHER

## 2023-07-05 NOTE — PROGRESS NOTES
Subjective:      Patient ID: Sasha Michelle is a 72 y.o. female.    Chief Complaint: Back Pain    See MA note.  Lower back symptoms have been stable since her last evaluation.  No aggravation of her lower back pain.  She is requesting a refill of her for medications.  More recently she was evaluated by her primary care provider regarding her ongoing elevated blood pressure.  Medication changes will likely be made and she is been asked to try to reduce her weight.    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 Negative for generalized weakness.   HENT: Negative.     Neck: neck negative.   Cardiovascular: Negative.    Eyes: Negative.    Respiratory: Negative.     Gastrointestinal: Negative.    Genitourinary: Negative.    Musculoskeletal:  Positive for back pain. Negative for pain, abnormal ROM of joint, muscle cramps and muscle ache.   Skin: Negative.    Neurological:  Negative for loss of balance, tingling and seizures.   Psychiatric/Behavioral:  Negative for sleep disturbance.    Objective:     Physical Exam     Physical Exam  Vitals and nursing note reviewed.   Constitutional:       General: She is not in acute distress.  HENT:      Head: Normocephalic.   Eyes:      Conjunctiva/sclera: Conjunctivae normal.   Musculoskeletal:      Cervical back: No pain with movement.      Lumbar back: Tenderness present. No swelling, deformity or spasms. Normal range of motion.        Back:       Comments: No gross deformity noted to the lumbar spine.  Tenderness on palpation to the L5-S1 level and to the right.  Slight tenderness at the same level and to the left.  She describes some discomfort during range of motion assessment but has full range of her lumbar spine otherwise.   Symmetrical strength and reflexes to lower extremities bilaterally.   Skin:     General: Skin is warm.      Capillary Refill: Capillary refill takes less than 2 seconds.      Findings: No erythema.   Neurological:      Mental Status: She is alert and oriented to person, place, and time.      Gait: Gait is intact.   Psychiatric:         Attention and Perception: Attention normal.         Mood and Affect: Mood and affect normal.         Speech: Speech normal.         Behavior: Behavior normal. Behavior is cooperative.   Assessment:      1. Encounter related to worker's compensation claim    2. Sprain of ligaments of lumbar spine, subsequent encounter    3. Chronic bilateral low back pain without sciatica    4. Degeneration of lumbar intervertebral disc    5. Spondylosis of thoracic region without myelopathy or radiculopathy    6. Chronic cervical pain      Plan:     Clinically, her examination is unchanged since her last evaluation.  Her lower back condition is well controlled with his current medication regimen so refills we provided today.  Reassess status in approximally 4 weeks.    I spent a total of 20 minutes on the day of the visit.This includes face to face time and non-face to face time preparing to see the patient (eg, review of tests), obtaining and/or reviewing separately obtained history, documenting clinical information in the electronic or other health record, independently interpreting results and communicating results to the patient/family/caregiver, or care coordinator.   Medications Ordered This Encounter   Medications    HYDROcodone-acetaminophen (NORCO)  mg per tablet     Sig: Take 1 tablet by mouth every 6 (six) hours as needed for Pain.     Dispense:  100 tablet     Refill:  0     Quantity prescribed more than 7 day supply? Yes, quantity medically necessary    ibuprofen (ADVIL,MOTRIN) 800 MG tablet     Sig: Take 1 tablet (800 mg total) by mouth 3 (three) times daily. Take medication with  food     Dispense:  60 tablet     Refill:  0    LIDOcaine (LIDODERM) 5 %     Sig: Place 1 patch onto the skin once daily. Remove & Discard patch within 12 hours or as directed by MD     Dispense:  30 patch     Refill:  2    tiZANidine (ZANAFLEX) 4 MG tablet     Si tablet QHS PRN pain/spasm     Dispense:  30 tablet     Refill:  0            Follow up in about 4 weeks (around 2023).

## 2023-07-05 NOTE — LETTER
[facility]  [address] [address]  Phone: [phone]  Fax: [phone]  [facility]: [id number]    Pt Name: Sasha John Date: 03/15/2001   Employee ID: [id number] Date of First Treatment: 07/05/2023   Company: [employer]      Appointment Time: 08:30 AM Arrived: 8:48am   Provider: Mendez Montes, DO Time Out: 9:30am     Office Treatment:   1. Encounter related to worker's compensation claim    2. Sprain of ligaments of lumbar spine, subsequent encounter    3. Chronic bilateral low back pain without sciatica    4. Degeneration of lumbar intervertebral disc    5. Spondylosis of thoracic region without myelopathy or radiculopathy    6. Chronic cervical pain      Medications Ordered This Encounter   Medications    HYDROcodone-acetaminophen (NORCO)  mg per tablet    ibuprofen (ADVIL,MOTRIN) 800 MG tablet    LIDOcaine (LIDODERM) 5 %    tiZANidine (ZANAFLEX) 4 MG tablet                 Return Appointment: 8/2/2023 at 8:45am

## 2023-08-02 ENCOUNTER — OFFICE VISIT (OUTPATIENT)
Dept: URGENT CARE | Facility: CLINIC | Age: 73
End: 2023-08-02
Payer: OTHER GOVERNMENT

## 2023-08-02 VITALS
DIASTOLIC BLOOD PRESSURE: 75 MMHG | SYSTOLIC BLOOD PRESSURE: 168 MMHG | OXYGEN SATURATION: 98 % | HEART RATE: 62 BPM | WEIGHT: 148 LBS | BODY MASS INDEX: 29.06 KG/M2 | HEIGHT: 60 IN | RESPIRATION RATE: 16 BRPM

## 2023-08-02 DIAGNOSIS — Z02.6 ENCOUNTER RELATED TO WORKER'S COMPENSATION CLAIM: Primary | ICD-10-CM

## 2023-08-02 DIAGNOSIS — M54.50 CHRONIC BILATERAL LOW BACK PAIN WITHOUT SCIATICA: ICD-10-CM

## 2023-08-02 DIAGNOSIS — M51.36 DEGENERATION OF LUMBAR INTERVERTEBRAL DISC: ICD-10-CM

## 2023-08-02 DIAGNOSIS — S33.5XXD SPRAIN OF LIGAMENTS OF LUMBAR SPINE, SUBSEQUENT ENCOUNTER: ICD-10-CM

## 2023-08-02 DIAGNOSIS — M47.814 SPONDYLOSIS OF THORACIC REGION WITHOUT MYELOPATHY OR RADICULOPATHY: ICD-10-CM

## 2023-08-02 DIAGNOSIS — G89.29 CHRONIC CERVICAL PAIN: ICD-10-CM

## 2023-08-02 DIAGNOSIS — G89.29 CHRONIC BILATERAL LOW BACK PAIN WITHOUT SCIATICA: ICD-10-CM

## 2023-08-02 DIAGNOSIS — M54.2 CHRONIC CERVICAL PAIN: ICD-10-CM

## 2023-08-02 PROCEDURE — 99213 OFFICE O/P EST LOW 20 MIN: CPT | Mod: S$GLB,,,

## 2023-08-02 PROCEDURE — 99213 PR OFFICE/OUTPT VISIT, EST, LEVL III, 20-29 MIN: ICD-10-PCS | Mod: S$GLB,,,

## 2023-08-02 RX ORDER — TIZANIDINE 4 MG/1
TABLET ORAL
Qty: 30 TABLET | Refills: 0 | Status: SHIPPED | OUTPATIENT
Start: 2023-08-02 | End: 2023-08-30 | Stop reason: SDUPTHER

## 2023-08-02 RX ORDER — IBUPROFEN 800 MG/1
800 TABLET ORAL 3 TIMES DAILY
Qty: 60 TABLET | Refills: 0 | Status: SHIPPED | OUTPATIENT
Start: 2023-08-02 | End: 2023-08-30 | Stop reason: SDUPTHER

## 2023-08-02 RX ORDER — OLMESARTAN MEDOXOMIL AND HYDROCHLOROTHIAZIDE 20/12.5 20; 12.5 MG/1; MG/1
1 TABLET ORAL
COMMUNITY
Start: 2023-06-22

## 2023-08-02 RX ORDER — HYDROCODONE BITARTRATE AND ACETAMINOPHEN 10; 325 MG/1; MG/1
1 TABLET ORAL EVERY 6 HOURS PRN
Qty: 100 TABLET | Refills: 0 | Status: SHIPPED | OUTPATIENT
Start: 2023-08-02 | End: 2023-08-30 | Stop reason: SDUPTHER

## 2023-08-02 NOTE — LETTER
[facility]  [address] [address]  Phone: [phone]  Fax: [phone]  [facility]: [id number]    Pt Name: Sasha John Date: 03/15/2003   Employee ID: [id number] Date of Treatment: 08/02/2023   Company: [employer]      Appointment Time: 08:45 AM Arrived: 9:05 AM    Provider: Mendez Montes, DO Time Out:10:35 AM      Office Treatment:   1. Encounter related to worker's compensation claim    2. Chronic bilateral low back pain without sciatica    3. Sprain of ligaments of lumbar spine, subsequent encounter    4. Degeneration of lumbar intervertebral disc    5. Spondylosis of thoracic region without myelopathy or radiculopathy    6. Chronic cervical pain      Medications Ordered This Encounter   Medications    HYDROcodone-acetaminophen (NORCO)  mg per tablet    ibuprofen (ADVIL,MOTRIN) 800 MG tablet    tiZANidine (ZANAFLEX) 4 MG tablet                 Return Appointment: 8/30/2023 at 8:30 AM [facility]

## 2023-08-02 NOTE — PROGRESS NOTES
Subjective:      Patient ID: Sasha Michelle is a 72 y.o. female.    Chief Complaint: Shoulder Injury    Follow-up evaluation for chronic lower lumbar pain.  Symptoms remain stable with the current medication treatment regimen.  More recently she is started to perform some light exercises at the gym which she is tolerating without any worsening of her lower back symptoms.  Today she needs a refill of her ibuprofen, muscle relaxer, and hydrocodone.    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 Negative for generalized weakness.   HENT: Negative.     Neck: neck negative.   Cardiovascular: Negative.    Eyes: Negative.    Respiratory: Negative.     Gastrointestinal: Negative.    Genitourinary: Negative.    Musculoskeletal:  Positive for back pain. Negative for pain, abnormal ROM of joint, muscle cramps and muscle ache.   Skin: Negative.    Neurological:  Negative for loss of balance, tingling and seizures.   Psychiatric/Behavioral:  Negative for sleep disturbance.      Objective:     Physical Exam     Physical Exam  Vitals and nursing note reviewed.   Constitutional:       General: She is not in acute distress.  HENT:      Head: Normocephalic.   Eyes:      Conjunctiva/sclera: Conjunctivae normal.   Musculoskeletal:      Cervical back: No pain with movement.      Lumbar back: Tenderness present. No swelling, deformity or spasms. Normal range of motion.        Back:       Comments: No gross deformity noted to the lumbar spine.  Tenderness on palpation to the L5-S1 level and to the right. She describes some discomfort during range of motion assessment but has full range of her lumbar spine otherwise.  Symmetrical strength and reflexes to lower extremities bilaterally.    Skin:     General: Skin is warm.      Capillary Refill: Capillary refill takes less than 2 seconds.      Findings: No erythema.   Neurological:      Mental Status: She is alert and oriented to person, place, and time.      Gait: Gait is intact.   Psychiatric:         Attention and Perception: Attention normal.         Mood and Affect: Mood and affect normal.         Speech: Speech normal.         Behavior: Behavior normal. Behavior is cooperative    Assessment:      1. Encounter related to worker's compensation claim    2. Chronic bilateral low back pain without sciatica    3. Sprain of ligaments of lumbar spine, subsequent encounter    4. Degeneration of lumbar intervertebral disc    5. Spondylosis of thoracic region without myelopathy or radiculopathy    6. Chronic cervical pain      Plan:   Chronic back injury is stable with the current medication regimen.  Refills of her medication will be provided today and I will ask her to follow up 4 weeks for re-evaluation.    I spent a total of 20 minutes on the day of the visit.This includes face to face time and non-face to face time preparing to see the patient (eg, review of tests), obtaining and/or reviewing separately obtained history, documenting clinical information in the electronic or other health record, independently interpreting results and communicating results to the patient/family/caregiver, or care coordinator.     Medications Ordered This Encounter   Medications    HYDROcodone-acetaminophen (NORCO)  mg per tablet     Sig: Take 1 tablet by mouth every 6 (six) hours as needed for Pain.     Dispense:  100 tablet     Refill:  0     Quantity prescribed more than 7 day supply? Yes, quantity medically necessary    ibuprofen (ADVIL,MOTRIN) 800 MG tablet     Sig: Take 1 tablet (800 mg total) by mouth 3 (three) times daily. Take medication with food     Dispense:  60 tablet     Refill:  0    tiZANidine (ZANAFLEX) 4 MG tablet     Si tablet QHS PRN  pain/spasm     Dispense:  30 tablet     Refill:  0            Follow up in about 4 weeks (around 8/30/2023).

## 2023-08-30 ENCOUNTER — OFFICE VISIT (OUTPATIENT)
Dept: URGENT CARE | Facility: CLINIC | Age: 73
End: 2023-08-30
Payer: OTHER GOVERNMENT

## 2023-08-30 VITALS
HEIGHT: 60 IN | BODY MASS INDEX: 29.06 KG/M2 | OXYGEN SATURATION: 98 % | SYSTOLIC BLOOD PRESSURE: 160 MMHG | DIASTOLIC BLOOD PRESSURE: 72 MMHG | HEART RATE: 62 BPM | TEMPERATURE: 98 F | WEIGHT: 148 LBS

## 2023-08-30 DIAGNOSIS — M51.36 DEGENERATION OF LUMBAR INTERVERTEBRAL DISC: ICD-10-CM

## 2023-08-30 DIAGNOSIS — S33.5XXD SPRAIN OF LIGAMENTS OF LUMBAR SPINE, SUBSEQUENT ENCOUNTER: ICD-10-CM

## 2023-08-30 DIAGNOSIS — G89.29 CHRONIC CERVICAL PAIN: ICD-10-CM

## 2023-08-30 DIAGNOSIS — G89.29 CHRONIC BILATERAL LOW BACK PAIN WITHOUT SCIATICA: Primary | ICD-10-CM

## 2023-08-30 DIAGNOSIS — M54.50 CHRONIC BILATERAL LOW BACK PAIN WITHOUT SCIATICA: Primary | ICD-10-CM

## 2023-08-30 DIAGNOSIS — M54.2 CHRONIC CERVICAL PAIN: ICD-10-CM

## 2023-08-30 DIAGNOSIS — M47.814 SPONDYLOSIS OF THORACIC REGION WITHOUT MYELOPATHY OR RADICULOPATHY: ICD-10-CM

## 2023-08-30 PROCEDURE — 99213 OFFICE O/P EST LOW 20 MIN: CPT | Mod: S$GLB,,,

## 2023-08-30 PROCEDURE — 99213 PR OFFICE/OUTPT VISIT, EST, LEVL III, 20-29 MIN: ICD-10-PCS | Mod: S$GLB,,,

## 2023-08-30 RX ORDER — TIZANIDINE 4 MG/1
TABLET ORAL
Qty: 30 TABLET | Refills: 0 | Status: SHIPPED | OUTPATIENT
Start: 2023-08-30 | End: 2023-09-27 | Stop reason: SDUPTHER

## 2023-08-30 RX ORDER — HYDROCODONE BITARTRATE AND ACETAMINOPHEN 10; 325 MG/1; MG/1
1 TABLET ORAL EVERY 6 HOURS PRN
Qty: 100 TABLET | Refills: 0 | Status: SHIPPED | OUTPATIENT
Start: 2023-08-30 | End: 2023-10-25 | Stop reason: SDUPTHER

## 2023-08-30 RX ORDER — IBUPROFEN 800 MG/1
800 TABLET ORAL 3 TIMES DAILY
Qty: 60 TABLET | Refills: 0 | Status: SHIPPED | OUTPATIENT
Start: 2023-08-30 | End: 2023-09-27 | Stop reason: SDUPTHER

## 2023-08-30 NOTE — LETTER
[facility]  [address] [address]  Phone: [phone]  Fax: [phone]  [facility]: [id number]    Pt Name: Sasha John Date: 03/15/2003   Employee ID: [id number] Date of Treatment: 08/30/2023   Company: [employer]      Appointment Time: 08:30 AM Arrived: 8:36 AM    Provider: Mendez Montes, DO Time Out:9:56 AM      Office Treatment:   1. Chronic bilateral low back pain without sciatica    2. Sprain of ligaments of lumbar spine, subsequent encounter    3. Degeneration of lumbar intervertebral disc    4. Spondylosis of thoracic region without myelopathy or radiculopathy    5. Chronic cervical pain      Medications Ordered This Encounter   Medications    HYDROcodone-acetaminophen (NORCO)  mg per tablet    ibuprofen (ADVIL,MOTRIN) 800 MG tablet    tiZANidine (ZANAFLEX) 4 MG tablet                 Return Appointment: 9/27/2023 at 8:30 AM [facility]

## 2023-08-30 NOTE — PROGRESS NOTES
Subjective:      Patient ID: Sasha Michelle is a 72 y.o. female.    Chief Complaint: Back Pain    Follow-up evaluation for chronic lower lumbar pain.  Symptoms remain stable with the current medication treatment regimen. Today she needs a refill of her ibuprofen, muscle relaxer, and hydrocodone.    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 Negative for generalized weakness.   HENT: Negative.     Neck: neck negative.   Cardiovascular: Negative.    Eyes: Negative.    Respiratory: Negative.     Gastrointestinal: Negative.    Genitourinary: Negative.    Musculoskeletal:  Positive for back pain. Negative for pain, abnormal ROM of joint, muscle cramps and muscle ache.   Skin: Negative.    Neurological:  Negative for loss of balance, tingling and seizures.   Psychiatric/Behavioral:  Negative for sleep disturbance.      Objective:       Physical Exam  Vitals and nursing note reviewed.   Constitutional:       General: She is not in acute distress.  HENT:      Head: Normocephalic.   Eyes:      Conjunctiva/sclera: Conjunctivae normal.   Musculoskeletal:      Cervical back: No pain with movement.      Lumbar back: Tenderness present. No swelling, deformity or spasms. Normal range of motion.        Back:       Comments: No gross deformity noted to the lumbar spine.  Tenderness on palpation to the L5-S1 level and to the right. She describes some discomfort during range of motion assessment but has full range of her lumbar spine otherwise.  Symmetrical strength and reflexes to lower extremities bilaterally.   Skin:     General: Skin is warm.      Capillary Refill: Capillary refill takes less than 2 seconds.      Findings: No erythema.   Neurological:      Mental  Status: She is alert and oriented to person, place, and time.      Gait: Gait is intact.   Psychiatric:         Attention and Perception: Attention normal.         Mood and Affect: Mood and affect normal.         Speech: Speech normal.         Behavior: Behavior normal. Behavior is cooperative    Assessment:      1. Chronic bilateral low back pain without sciatica    2. Sprain of ligaments of lumbar spine, subsequent encounter    3. Degeneration of lumbar intervertebral disc    4. Spondylosis of thoracic region without myelopathy or radiculopathy    5. Chronic cervical pain      Plan:   Her lower back injury is well managed with the current medication regimen.  Refills of the medication will be provided today and I have asked her to follow up in approximally 4 weeks for re-evaluation.    I spent a total of 20 minutes on the day of the visit.This includes face to face time and non-face to face time preparing to see the patient (eg, review of tests), obtaining and/or reviewing separately obtained history, documenting clinical information in the electronic or other health record, independently interpreting results and communicating results to the patient/family/caregiver, or care coordinator.     Medications Ordered This Encounter   Medications    HYDROcodone-acetaminophen (NORCO)  mg per tablet     Sig: Take 1 tablet by mouth every 6 (six) hours as needed for Pain.     Dispense:  100 tablet     Refill:  0     Quantity prescribed more than 7 day supply? Yes, quantity medically necessary    ibuprofen (ADVIL,MOTRIN) 800 MG tablet     Sig: Take 1 tablet (800 mg total) by mouth 3 (three) times daily. Take medication with food     Dispense:  60 tablet     Refill:  0    tiZANidine (ZANAFLEX) 4 MG tablet     Si tablet QHS PRN pain/spasm     Dispense:  30 tablet     Refill:  0            Follow up in about 4 weeks (around 2023).

## 2023-09-27 ENCOUNTER — OFFICE VISIT (OUTPATIENT)
Dept: URGENT CARE | Facility: CLINIC | Age: 73
End: 2023-09-27
Payer: OTHER GOVERNMENT

## 2023-09-27 VITALS
HEART RATE: 66 BPM | TEMPERATURE: 98 F | HEIGHT: 60 IN | SYSTOLIC BLOOD PRESSURE: 163 MMHG | BODY MASS INDEX: 29.06 KG/M2 | WEIGHT: 148 LBS | DIASTOLIC BLOOD PRESSURE: 76 MMHG | OXYGEN SATURATION: 97 % | RESPIRATION RATE: 18 BRPM

## 2023-09-27 DIAGNOSIS — M51.36 DEGENERATION OF LUMBAR INTERVERTEBRAL DISC: ICD-10-CM

## 2023-09-27 DIAGNOSIS — S33.5XXD SPRAIN OF LIGAMENTS OF LUMBAR SPINE, SUBSEQUENT ENCOUNTER: ICD-10-CM

## 2023-09-27 DIAGNOSIS — M54.2 CHRONIC CERVICAL PAIN: ICD-10-CM

## 2023-09-27 DIAGNOSIS — Z02.6 ENCOUNTER RELATED TO WORKER'S COMPENSATION CLAIM: ICD-10-CM

## 2023-09-27 DIAGNOSIS — G89.29 CHRONIC BILATERAL LOW BACK PAIN WITHOUT SCIATICA: Primary | ICD-10-CM

## 2023-09-27 DIAGNOSIS — M54.50 CHRONIC BILATERAL LOW BACK PAIN WITHOUT SCIATICA: Primary | ICD-10-CM

## 2023-09-27 DIAGNOSIS — G89.29 CHRONIC CERVICAL PAIN: ICD-10-CM

## 2023-09-27 DIAGNOSIS — M47.814 SPONDYLOSIS OF THORACIC REGION WITHOUT MYELOPATHY OR RADICULOPATHY: ICD-10-CM

## 2023-09-27 PROCEDURE — 99213 PR OFFICE/OUTPT VISIT, EST, LEVL III, 20-29 MIN: ICD-10-PCS | Mod: S$GLB,,,

## 2023-09-27 PROCEDURE — 99213 OFFICE O/P EST LOW 20 MIN: CPT | Mod: S$GLB,,,

## 2023-09-27 RX ORDER — IBUPROFEN 800 MG/1
800 TABLET ORAL 3 TIMES DAILY
Qty: 60 TABLET | Refills: 0 | Status: SHIPPED | OUTPATIENT
Start: 2023-09-27 | End: 2023-11-28 | Stop reason: SDUPTHER

## 2023-09-27 RX ORDER — TIZANIDINE 4 MG/1
TABLET ORAL
Qty: 30 TABLET | Refills: 0 | Status: SHIPPED | OUTPATIENT
Start: 2023-09-27 | End: 2023-10-25 | Stop reason: SDUPTHER

## 2023-09-27 RX ORDER — HYDROCODONE BITARTRATE AND ACETAMINOPHEN 7.5; 325 MG/1; MG/1
1 TABLET ORAL EVERY 6 HOURS PRN
Qty: 100 TABLET | Refills: 0 | Status: SHIPPED | OUTPATIENT
Start: 2023-09-27 | End: 2023-11-28

## 2023-09-27 NOTE — LETTER
[facility]  [address] [address]  Phone: [phone]  Fax: [phone]  [facility]: [id number]    Pt Name: Sasha John Date: [birth year]/15/2003   Employee ID: [id number] Date of Treatment: 09/27/2023   Company: [employer]      Appointment Time: 10:00 AM Arrived: 9:57 AM   Provider: Mendez Montes, DO Time Out:11:17 AM      Office Treatment:   1. Chronic bilateral low back pain without sciatica    2. Encounter related to worker's compensation claim    3. Sprain of ligaments of lumbar spine, subsequent encounter    4. Degeneration of lumbar intervertebral disc    5. Spondylosis of thoracic region without myelopathy or radiculopathy    6. Chronic cervical pain      Medications Ordered This Encounter   Medications    HYDROcodone-acetaminophen (NORCO) 7.5-325 mg per tablet    ibuprofen (ADVIL,MOTRIN) 800 MG tablet    tiZANidine (ZANAFLEX) 4 MG tablet                 Return Appointment: 10/25/2023 at 8:30 AM [facility]

## 2023-09-27 NOTE — PROGRESS NOTES
Subjective:      Patient ID: Sasha Michelle is a 72 y.o. female.    Chief Complaint: Back Injury    See MA note.  Delay unchanged since her last evaluation.  He is requesting refills of her hydrocodone, Zanaflex, and ibuprofen medications.  She was advised by the pharmacy that the hydrocodone 10 mg tablets from back order and they have suggested using the 7.5 mg dose.  She does acknowledge her blood pressure has been elevated but she is been working with her primary care doctor with diet changes and weight loss to try to help with her symptoms.    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 Negative for generalized weakness.   Neck: Negative for neck pain and neck stiffness.   Musculoskeletal:  Positive for pain, back pain, muscle cramps and muscle ache. Negative for abnormal ROM of joint.   Neurological:  Negative for loss of balance, numbness and tingling.   Psychiatric/Behavioral:  Negative for sleep disturbance.      Objective:     Physical Exam  Vitals and nursing note reviewed.   Constitutional:       General: She is not in acute distress.  HENT:      Head: Normocephalic.   Eyes:      Conjunctiva/sclera: Conjunctivae normal.   Musculoskeletal:      Cervical back: No pain with movement.      Lumbar back: Tenderness present. No swelling, deformity or spasms. Normal range of motion.        Back:       Comments: No gross deformity noted to the lumbar spine.  Tenderness on palpation to the L5-S1 level and to the right. She describes some discomfort during range of motion assessment but has full range of her lumbar spine otherwise.  Symmetrical strength and reflexes to lower extremities bilaterally.   Skin:     General: Skin is warm.      Capillary Refill:  Capillary refill takes less than 2 seconds.      Findings: No erythema.   Neurological:      Mental Status: She is alert and oriented to person, place, and time.      Gait: Gait is intact.   Psychiatric:         Attention and Perception: Attention normal.         Mood and Affect: Mood and affect normal.         Speech: Speech normal.         Behavior: Behavior normal. Behavior is cooperative       Assessment:      1. Chronic bilateral low back pain without sciatica    2. Encounter related to worker's compensation claim    3. Sprain of ligaments of lumbar spine, subsequent encounter    4. Degeneration of lumbar intervertebral disc    5. Spondylosis of thoracic region without myelopathy or radiculopathy    6. Chronic cervical pain      Plan:   Clinically unchanged today past visits.  She is aware to monitor her pain symptoms which could increase since we are using a lower dose of the hydrocodone.  The hydrocodone 10 mg dosing allow for pain control and to remain functional and performing her activities of daily living.  Refills of her muscle relaxer and ibuprofen will hopefully help control her symptoms in the interim.  Follow up in approximally 4 weeks for re-evaluation.    I spent a total of 25 minutes on the day of the visit.This includes face to face time and non-face to face time preparing to see the patient (eg, review of tests), obtaining and/or reviewing separately obtained history, documenting clinical information in the electronic or other health record, independently interpreting results and communicating results to the patient/family/caregiver, or care coordinator.     Medications Ordered This Encounter   Medications    HYDROcodone-acetaminophen (NORCO) 7.5-325 mg per tablet     Sig: Take 1 tablet by mouth every 6 (six) hours as needed for Pain.     Dispense:  100 tablet     Refill:  0     Quantity prescribed more than 7 day supply? Yes, quantity medically necessary     Order Specific Question:   I have  reviewed the Prescription Drug Monitoring Program (PDMP) database for this patient prior to prescribing the above opioid medication     Answer:   Yes    ibuprofen (ADVIL,MOTRIN) 800 MG tablet     Sig: Take 1 tablet (800 mg total) by mouth 3 (three) times daily. Take medication with food     Dispense:  60 tablet     Refill:  0    tiZANidine (ZANAFLEX) 4 MG tablet     Si tablet QHS PRN pain/spasm     Dispense:  30 tablet     Refill:  0            Follow up in about 4 weeks (around 10/25/2023).

## 2023-10-25 ENCOUNTER — OFFICE VISIT (OUTPATIENT)
Dept: URGENT CARE | Facility: CLINIC | Age: 73
End: 2023-10-25
Payer: OTHER GOVERNMENT

## 2023-10-25 VITALS
HEART RATE: 69 BPM | DIASTOLIC BLOOD PRESSURE: 79 MMHG | WEIGHT: 148 LBS | SYSTOLIC BLOOD PRESSURE: 131 MMHG | HEIGHT: 60 IN | OXYGEN SATURATION: 98 % | TEMPERATURE: 98 F | RESPIRATION RATE: 18 BRPM | BODY MASS INDEX: 29.06 KG/M2

## 2023-10-25 DIAGNOSIS — S33.5XXD SPRAIN OF LIGAMENTS OF LUMBAR SPINE, SUBSEQUENT ENCOUNTER: ICD-10-CM

## 2023-10-25 DIAGNOSIS — M54.50 CHRONIC BILATERAL LOW BACK PAIN WITHOUT SCIATICA: ICD-10-CM

## 2023-10-25 DIAGNOSIS — Z02.6 ENCOUNTER RELATED TO WORKER'S COMPENSATION CLAIM: Primary | ICD-10-CM

## 2023-10-25 DIAGNOSIS — G89.29 CHRONIC BILATERAL LOW BACK PAIN WITHOUT SCIATICA: ICD-10-CM

## 2023-10-25 PROCEDURE — 99213 OFFICE O/P EST LOW 20 MIN: CPT | Mod: S$GLB,,,

## 2023-10-25 PROCEDURE — 99213 PR OFFICE/OUTPT VISIT, EST, LEVL III, 20-29 MIN: ICD-10-PCS | Mod: S$GLB,,,

## 2023-10-25 RX ORDER — HYDROCODONE BITARTRATE AND ACETAMINOPHEN 10; 325 MG/1; MG/1
1 TABLET ORAL EVERY 6 HOURS PRN
Qty: 100 TABLET | Refills: 0 | Status: SHIPPED | OUTPATIENT
Start: 2023-10-25 | End: 2023-11-28 | Stop reason: SDUPTHER

## 2023-10-25 RX ORDER — TIZANIDINE 4 MG/1
TABLET ORAL
Qty: 30 TABLET | Refills: 0 | Status: SHIPPED | OUTPATIENT
Start: 2023-10-25 | End: 2023-11-28 | Stop reason: SDUPTHER

## 2023-10-25 NOTE — PROGRESS NOTES
Subjective:      Patient ID: Sasha Michelle is a 72 y.o. female.    Chief Complaint: Back Pain    See MA note.  Symptoms unchanged since her last evaluation.  He is requesting refills of her hydrocodone and Zanaflex.   She does acknowledge working with her PCP re: blood pressure. She has lost about 4 lbs to help with BP.       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 Negative for generalized weakness.   Neck: Negative for neck pain and neck stiffness.   Musculoskeletal:  Positive for pain, back pain, muscle cramps and muscle ache. Negative for abnormal ROM of joint.   Neurological:  Negative for loss of balance, numbness and tingling.   Psychiatric/Behavioral:  Negative for sleep disturbance.      Objective:       Physical Exam  Vitals and nursing note reviewed.   Constitutional:       General: She is not in acute distress.  HENT:      Head: Normocephalic.   Eyes:      Conjunctiva/sclera: Conjunctivae normal.   Musculoskeletal:      Cervical back: No pain with movement.      Lumbar back: Tenderness present. No swelling, deformity or spasms. Normal range of motion.        Back:       Comments: No gross deformity noted to the lumbar spine.  Tenderness on palpation to the L5-S1 level and to the right. She describes some discomfort during range of motion assessment but has full range of her lumbar spine otherwise.  Symmetrical strength and reflexes to lower extremities bilaterally.   Skin:     General: Skin is warm.      Capillary Refill: Capillary refill takes less than 2 seconds.      Findings: No erythema.   Neurological:      Mental Status: She is alert and oriented to person, place, and time.      Gait: Gait is intact.   Psychiatric:         Attention and  Perception: Attention normal.         Mood and Affect: Mood and affect normal.         Speech: Speech normal.         Behavior: Behavior normal. Behavior is cooperative    Assessment:      1. Encounter related to worker's compensation claim    2. Chronic bilateral low back pain without sciatica    3. Sprain of ligaments of lumbar spine, subsequent encounter      Plan:   Sasha returns today regarding her chronic lumbar injury.  Subjective symptoms and clinical examination remains unchanged.  She is been well controlled with her current medication regimen for a number of years.  She only needs a refill of the muscle relaxer and pain medication at this time.  She does not need ibuprofen.  I will have her follow up in approximately 1 month for re-evaluation.    I spent a total of 25 minutes on the day of the visit.This includes face to face time and non-face to face time preparing to see the patient (eg, review of tests), obtaining and/or reviewing separately obtained history, documenting clinical information in the electronic or other health record, independently interpreting results and communicating results to the patient/family/caregiver, or care coordinator.    Medications Ordered This Encounter   Medications    HYDROcodone-acetaminophen (NORCO)  mg per tablet     Sig: Take 1 tablet by mouth every 6 (six) hours as needed for Pain.     Dispense:  100 tablet     Refill:  0     Quantity prescribed more than 7 day supply? Yes, quantity medically necessary    tiZANidine (ZANAFLEX) 4 MG tablet     Si tablet QHS PRN pain/spasm     Dispense:  30 tablet     Refill:  0            Follow up in about 1 month (around 2023).

## 2023-10-25 NOTE — LETTER
[facility]  [address] [address]  Phone: [phone]  Fax: [phone]  [facility]: [id number]    Pt Name: Sasha Michelle  Injury Date: 03/15/2001   Employee ID: [id number] Date of Treatment: 10/25/2023   Company: [employer]      Appointment Time: 08:30 AM Arrived: 8:25 AM   Provider: Mendez Montes, DO Time Out:10:05 AM     Office Treatment:   1. Encounter related to worker's compensation claim    2. Chronic bilateral low back pain without sciatica    3. Sprain of ligaments of lumbar spine, subsequent encounter      Medications Ordered This Encounter   Medications    HYDROcodone-acetaminophen (NORCO)  mg per tablet    tiZANidine (ZANAFLEX) 4 MG tablet                 Return Appointment: 11/28/2023 at 8:30 AM [facility]

## 2023-11-28 ENCOUNTER — OFFICE VISIT (OUTPATIENT)
Dept: URGENT CARE | Facility: CLINIC | Age: 73
End: 2023-11-28
Payer: OTHER GOVERNMENT

## 2023-11-28 VITALS
HEIGHT: 60 IN | BODY MASS INDEX: 29.06 KG/M2 | RESPIRATION RATE: 18 BRPM | SYSTOLIC BLOOD PRESSURE: 146 MMHG | WEIGHT: 148 LBS | DIASTOLIC BLOOD PRESSURE: 80 MMHG | OXYGEN SATURATION: 97 % | HEART RATE: 60 BPM

## 2023-11-28 DIAGNOSIS — M54.50 CHRONIC BILATERAL LOW BACK PAIN WITHOUT SCIATICA: ICD-10-CM

## 2023-11-28 DIAGNOSIS — M47.814 SPONDYLOSIS OF THORACIC REGION WITHOUT MYELOPATHY OR RADICULOPATHY: ICD-10-CM

## 2023-11-28 DIAGNOSIS — G89.29 CHRONIC BILATERAL LOW BACK PAIN WITHOUT SCIATICA: ICD-10-CM

## 2023-11-28 DIAGNOSIS — S33.5XXD SPRAIN OF LIGAMENTS OF LUMBAR SPINE, SUBSEQUENT ENCOUNTER: ICD-10-CM

## 2023-11-28 DIAGNOSIS — M54.2 CHRONIC CERVICAL PAIN: ICD-10-CM

## 2023-11-28 DIAGNOSIS — M51.36 DEGENERATION OF LUMBAR INTERVERTEBRAL DISC: ICD-10-CM

## 2023-11-28 DIAGNOSIS — G89.29 CHRONIC CERVICAL PAIN: ICD-10-CM

## 2023-11-28 PROCEDURE — 99213 PR OFFICE/OUTPT VISIT, EST, LEVL III, 20-29 MIN: ICD-10-PCS | Mod: S$GLB,,,

## 2023-11-28 PROCEDURE — 99213 OFFICE O/P EST LOW 20 MIN: CPT | Mod: S$GLB,,,

## 2023-11-28 RX ORDER — HYDROCODONE BITARTRATE AND ACETAMINOPHEN 10; 325 MG/1; MG/1
1 TABLET ORAL EVERY 6 HOURS PRN
Qty: 100 TABLET | Refills: 0 | Status: SHIPPED | OUTPATIENT
Start: 2023-11-28 | End: 2023-12-27 | Stop reason: SDUPTHER

## 2023-11-28 RX ORDER — TIZANIDINE 4 MG/1
TABLET ORAL
Qty: 30 TABLET | Refills: 0 | Status: SHIPPED | OUTPATIENT
Start: 2023-11-28 | End: 2023-12-27 | Stop reason: SDUPTHER

## 2023-11-28 RX ORDER — ACYCLOVIR 400 MG/1
TABLET ORAL
COMMUNITY

## 2023-11-28 RX ORDER — ATORVASTATIN CALCIUM 40 MG/1
TABLET, FILM COATED ORAL
COMMUNITY
Start: 2023-09-29

## 2023-11-28 RX ORDER — PROMETHAZINE HYDROCHLORIDE 25 MG/1
TABLET ORAL
COMMUNITY

## 2023-11-28 RX ORDER — IBUPROFEN 800 MG/1
800 TABLET ORAL 3 TIMES DAILY
Qty: 60 TABLET | Refills: 0 | Status: SHIPPED | OUTPATIENT
Start: 2023-11-28 | End: 2023-12-27 | Stop reason: SDUPTHER

## 2023-11-28 NOTE — PROGRESS NOTES
Subjective:      Patient ID: Sasha Michelle is a 72 y.o. female.    Chief Complaint: Back Pain    See MA note.  Chronic lower lumbar pain follow-up visit.  Sasha's lumbar symptoms remain stable with the use of the prescribed medications including hydrocodone, Zanaflex, and ibuprofen.  She is requesting a refill of these medications today.  She did note an episode of increased lumbar pain few weeks ago that radiated into her right buttock and upper right hamstring area.  However this lasted approximately 2 days and she returned back to her baseline pain level.  Otherwise she denies any changes in her symptoms.    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       Constitution: Negative.   HENT: Negative.     Neck: neck negative.   Cardiovascular: Negative.    Eyes: Negative.    Respiratory: Negative.     Gastrointestinal: Negative.    Genitourinary: Negative.    Musculoskeletal:  Positive for pain, joint pain, abnormal ROM of joint, back pain and muscle ache. Negative for trauma, joint swelling and muscle cramps.   Skin: Negative.    Neurological: Negative.  Negative for numbness and tingling.     Objective:     Physical Exam  Vitals and nursing note reviewed.   Constitutional:       General: She is not in acute distress.  HENT:      Head: Normocephalic.   Eyes:      Conjunctiva/sclera: Conjunctivae normal.   Musculoskeletal:      Cervical back: No pain with movement.      Lumbar back: Tenderness present. No swelling, deformity or spasms. Normal range of motion.        Back:       Comments: No gross deformity noted to the lumbar spine.  Tenderness on palpation to the L5-S1 level and to the right. She describes some discomfort during range of motion assessment but  has full range of her lumbar spine otherwise.  Symmetrical strength and reflexes to lower extremities bilaterally.   Skin:     General: Skin is warm.      Capillary Refill: Capillary refill takes less than 2 seconds.      Findings: No erythema.   Neurological:      Mental Status: She is alert and oriented to person, place, and time.      Gait: Gait is intact.   Psychiatric:         Attention and Perception: Attention normal.         Mood and Affect: Mood and affect normal.         Speech: Speech normal.         Behavior: Behavior normal. Behavior is cooperative    Assessment:      1. Sprain of ligaments of lumbar spine, subsequent encounter    2. Chronic bilateral low back pain without sciatica    3. Degeneration of lumbar intervertebral disc    4. Spondylosis of thoracic region without myelopathy or radiculopathy    5. Chronic cervical pain      Plan:   Her clinical condition remain stable with the use of the prescribed medications.  Refills will be provided today.  She is retired and, hence, no work limitations have been documented.  We will have her follow up in approximately 1 month for re-evaluation.    I spent a total of 20 minutes on the day of the visit.This includes face to face time and non-face to face time preparing to see the patient (eg, review of tests), obtaining and/or reviewing separately obtained history, documenting clinical information in the electronic or other health record, independently interpreting results and communicating results to the patient/family/caregiver, or care coordinator.      Medications Ordered This Encounter   Medications    HYDROcodone-acetaminophen (NORCO)  mg per tablet     Sig: Take 1 tablet by mouth every 6 (six) hours as needed for Pain.     Dispense:  100 tablet     Refill:  0     Quantity prescribed more than 7 day supply? Yes, quantity medically necessary    ibuprofen (ADVIL,MOTRIN) 800 MG tablet     Sig: Take 1 tablet (800 mg total) by mouth 3 (three) times  daily. Take medication with food     Dispense:  60 tablet     Refill:  0    tiZANidine (ZANAFLEX) 4 MG tablet     Si tablet QHS PRN pain/spasm     Dispense:  30 tablet     Refill:  0            Follow up in about 29 days (around 2023).

## 2023-11-28 NOTE — LETTER
[facility]  [address] [address]  Phone: [phone]  Fax: [phone]  [facility]: [id number]    Pt Name: Sasha John Date: 03/15/2003   Employee ID: [id number] Date of Treatment: 11/28/2023   Company: [employer]      Appointment Time: 08:30 AM Arrived: 8:30 AM    Provider: Mendez Montes, DO Time Out:9:44 AM      Office Treatment:   1. Sprain of ligaments of lumbar spine, subsequent encounter    2. Chronic bilateral low back pain without sciatica    3. Degeneration of lumbar intervertebral disc    4. Spondylosis of thoracic region without myelopathy or radiculopathy    5. Chronic cervical pain      Medications Ordered This Encounter   Medications    HYDROcodone-acetaminophen (NORCO)  mg per tablet    ibuprofen (ADVIL,MOTRIN) 800 MG tablet    tiZANidine (ZANAFLEX) 4 MG tablet                 Return Appointment: 12/27/2023 at 8:45 AM [facility]

## 2023-12-27 ENCOUNTER — OFFICE VISIT (OUTPATIENT)
Dept: URGENT CARE | Facility: CLINIC | Age: 73
End: 2023-12-27
Payer: OTHER GOVERNMENT

## 2023-12-27 VITALS
TEMPERATURE: 98 F | RESPIRATION RATE: 17 BRPM | HEART RATE: 74 BPM | OXYGEN SATURATION: 96 % | WEIGHT: 148 LBS | BODY MASS INDEX: 28.9 KG/M2

## 2023-12-27 DIAGNOSIS — M47.814 SPONDYLOSIS OF THORACIC REGION WITHOUT MYELOPATHY OR RADICULOPATHY: ICD-10-CM

## 2023-12-27 DIAGNOSIS — M54.2 CHRONIC CERVICAL PAIN: ICD-10-CM

## 2023-12-27 DIAGNOSIS — M51.36 DEGENERATION OF LUMBAR INTERVERTEBRAL DISC: ICD-10-CM

## 2023-12-27 DIAGNOSIS — G89.29 CHRONIC CERVICAL PAIN: ICD-10-CM

## 2023-12-27 DIAGNOSIS — G89.29 CHRONIC BILATERAL LOW BACK PAIN WITHOUT SCIATICA: ICD-10-CM

## 2023-12-27 DIAGNOSIS — S33.5XXD SPRAIN OF LIGAMENTS OF LUMBAR SPINE, SUBSEQUENT ENCOUNTER: ICD-10-CM

## 2023-12-27 DIAGNOSIS — M54.50 CHRONIC BILATERAL LOW BACK PAIN WITHOUT SCIATICA: ICD-10-CM

## 2023-12-27 DIAGNOSIS — Z02.6 ENCOUNTER RELATED TO WORKER'S COMPENSATION CLAIM: Primary | ICD-10-CM

## 2023-12-27 PROCEDURE — 99213 PR OFFICE/OUTPT VISIT, EST, LEVL III, 20-29 MIN: ICD-10-PCS | Mod: S$GLB,,,

## 2023-12-27 PROCEDURE — 99213 OFFICE O/P EST LOW 20 MIN: CPT | Mod: S$GLB,,,

## 2023-12-27 RX ORDER — IBUPROFEN 800 MG/1
800 TABLET ORAL 3 TIMES DAILY
Qty: 60 TABLET | Refills: 0 | Status: SHIPPED | OUTPATIENT
Start: 2023-12-27 | End: 2024-01-25 | Stop reason: SDUPTHER

## 2023-12-27 RX ORDER — HYDROCODONE BITARTRATE AND ACETAMINOPHEN 10; 325 MG/1; MG/1
1 TABLET ORAL EVERY 6 HOURS PRN
Qty: 100 TABLET | Refills: 0 | Status: SHIPPED | OUTPATIENT
Start: 2023-12-27 | End: 2024-01-25 | Stop reason: SDUPTHER

## 2023-12-27 RX ORDER — TIZANIDINE 4 MG/1
TABLET ORAL
Qty: 30 TABLET | Refills: 0 | Status: SHIPPED | OUTPATIENT
Start: 2023-12-27 | End: 2024-01-25 | Stop reason: SDUPTHER

## 2023-12-27 RX ORDER — HYDROCODONE BITARTRATE AND ACETAMINOPHEN 10; 325 MG/1; MG/1
1 TABLET ORAL EVERY 6 HOURS PRN
Qty: 100 TABLET | Refills: 0 | Status: SHIPPED | OUTPATIENT
Start: 2023-12-27 | End: 2023-12-27 | Stop reason: SDUPTHER

## 2023-12-27 RX ORDER — LIDOCAINE 50 MG/G
1 PATCH TOPICAL DAILY
Qty: 30 PATCH | Refills: 2 | Status: SHIPPED | OUTPATIENT
Start: 2023-12-27 | End: 2024-01-25 | Stop reason: SDUPTHER

## 2023-12-27 NOTE — PROGRESS NOTES
Subjective:      Patient ID: Sasha Michelle is a 72 y.o. female.    Chief Complaint: Back Injury    See MA note.  Follow-up evaluation for chronic lumbar injury.  Her symptoms have been stable with the use of the prescribed medications.  She has requesting a refill of all her medications including lidocaine patches today.  She is maintained her activities of daily living and continues to perform her stretching exercises regularly.     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 Negative for generalized weakness.   Musculoskeletal:  Positive for pain, back pain, muscle cramps and muscle ache. Negative for abnormal ROM of joint.   Neurological:  Negative for loss of balance, numbness and tingling.   Psychiatric/Behavioral:  Positive for sleep disturbance.      Objective:     Physical Exam  Vitals and nursing note reviewed.   Constitutional:       General: She is not in acute distress.  HENT:      Head: Normocephalic.   Eyes:      Conjunctiva/sclera: Conjunctivae normal.   Musculoskeletal:      Cervical back: No pain with movement.      Lumbar back: Tenderness present. No swelling, deformity or spasms. Normal range of motion.        Back:       Comments: No gross deformity noted to the lumbar spine.  Tenderness on palpation to the L5-S1 level and to the right. She describes some discomfort during range of motion assessment but has full range of her lumbar spine otherwise.  Symmetrical strength and reflexes to lower extremities bilaterally.   Skin:     General: Skin is warm.      Capillary Refill: Capillary refill takes less than 2 seconds.      Findings: No erythema.   Neurological:      Mental Status: She is alert and oriented to person,  place, and time.      Gait: Gait is intact.   Psychiatric:         Attention and Perception: Attention normal.         Mood and Affect: Mood and affect normal.         Speech: Speech normal.         Behavior: Behavior normal. Behavior is cooperative    Assessment:      1. Encounter related to worker's compensation claim    2. Sprain of ligaments of lumbar spine, subsequent encounter    3. Chronic bilateral low back pain without sciatica    4. Degeneration of lumbar intervertebral disc    5. Spondylosis of thoracic region without myelopathy or radiculopathy    6. Chronic cervical pain      Plan:   Subjective symptoms and clinical evaluation is unchanged today.  Refill of her medications will be provided today and I will have her follow up in approximately 1 month for re-evaluation.      Medications Ordered This Encounter   Medications    HYDROcodone-acetaminophen (NORCO)  mg per tablet     Sig: Take 1 tablet by mouth every 6 (six) hours as needed for Pain.     Dispense:  100 tablet     Refill:  0     Quantity prescribed more than 7 day supply? Yes, quantity medically necessary    ibuprofen (ADVIL,MOTRIN) 800 MG tablet     Sig: Take 1 tablet (800 mg total) by mouth 3 (three) times daily. Take medication with food     Dispense:  60 tablet     Refill:  0    LIDOcaine (LIDODERM) 5 %     Sig: Place 1 patch onto the skin once daily. Remove & Discard patch within 12 hours or as directed by MD     Dispense:  30 patch     Refill:  2    tiZANidine (ZANAFLEX) 4 MG tablet     Si tablet QHS PRN pain/spasm     Dispense:  30 tablet     Refill:  0            Follow up in about 4 weeks (around 2024).

## 2023-12-27 NOTE — LETTER
[facility]  [address] [address]  Phone: [phone]  Fax: [phone]  [facility]: [id number]    Pt Name: Sasha Michelle  Injury Date: 03/15/2001   Employee ID: [id number] Date of Treatment: 12/27/2023   Company: [employer]      Appointment Time: 08:45 AM Arrived: 9:24 AM    Provider: Mendez Montes, DO Time Out:10:44 AM      Office Treatment:   1. Encounter related to worker's compensation claim    2. Sprain of ligaments of lumbar spine, subsequent encounter    3. Chronic bilateral low back pain without sciatica    4. Degeneration of lumbar intervertebral disc    5. Spondylosis of thoracic region without myelopathy or radiculopathy    6. Chronic cervical pain      Medications Ordered This Encounter   Medications    HYDROcodone-acetaminophen (NORCO)  mg per tablet    ibuprofen (ADVIL,MOTRIN) 800 MG tablet    LIDOcaine (LIDODERM) 5 %    tiZANidine (ZANAFLEX) 4 MG tablet                 Return Appointment: 1/24/2024 at 8:30 AM [facility] [place]

## 2024-01-25 ENCOUNTER — OFFICE VISIT (OUTPATIENT)
Dept: URGENT CARE | Facility: CLINIC | Age: 74
End: 2024-01-25
Payer: OTHER GOVERNMENT

## 2024-01-25 VITALS
SYSTOLIC BLOOD PRESSURE: 166 MMHG | DIASTOLIC BLOOD PRESSURE: 72 MMHG | OXYGEN SATURATION: 98 % | TEMPERATURE: 98 F | WEIGHT: 148 LBS | HEART RATE: 61 BPM | HEIGHT: 60 IN | BODY MASS INDEX: 29.06 KG/M2

## 2024-01-25 DIAGNOSIS — Z02.6 ENCOUNTER RELATED TO WORKER'S COMPENSATION CLAIM: Primary | ICD-10-CM

## 2024-01-25 DIAGNOSIS — M47.814 SPONDYLOSIS OF THORACIC REGION WITHOUT MYELOPATHY OR RADICULOPATHY: ICD-10-CM

## 2024-01-25 DIAGNOSIS — M54.2 CHRONIC CERVICAL PAIN: ICD-10-CM

## 2024-01-25 DIAGNOSIS — M51.36 DEGENERATION OF LUMBAR INTERVERTEBRAL DISC: ICD-10-CM

## 2024-01-25 DIAGNOSIS — S33.5XXD SPRAIN OF LIGAMENTS OF LUMBAR SPINE, SUBSEQUENT ENCOUNTER: ICD-10-CM

## 2024-01-25 DIAGNOSIS — G89.29 CHRONIC BILATERAL LOW BACK PAIN WITHOUT SCIATICA: ICD-10-CM

## 2024-01-25 DIAGNOSIS — M54.50 CHRONIC BILATERAL LOW BACK PAIN WITHOUT SCIATICA: ICD-10-CM

## 2024-01-25 DIAGNOSIS — G89.29 CHRONIC CERVICAL PAIN: ICD-10-CM

## 2024-01-25 PROCEDURE — 99213 OFFICE O/P EST LOW 20 MIN: CPT | Mod: S$GLB,,,

## 2024-01-25 RX ORDER — LIDOCAINE 50 MG/G
1 PATCH TOPICAL DAILY
Qty: 30 PATCH | Refills: 2 | Status: SHIPPED | OUTPATIENT
Start: 2024-01-25 | End: 2024-02-23 | Stop reason: SDUPTHER

## 2024-01-25 RX ORDER — IBUPROFEN 800 MG/1
800 TABLET ORAL 3 TIMES DAILY
Qty: 60 TABLET | Refills: 0 | Status: SHIPPED | OUTPATIENT
Start: 2024-01-25 | End: 2024-04-26

## 2024-01-25 RX ORDER — TIZANIDINE 4 MG/1
TABLET ORAL
Qty: 30 TABLET | Refills: 0 | Status: SHIPPED | OUTPATIENT
Start: 2024-01-25 | End: 2024-02-23 | Stop reason: SDUPTHER

## 2024-01-25 RX ORDER — HYDROCODONE BITARTRATE AND ACETAMINOPHEN 10; 325 MG/1; MG/1
1 TABLET ORAL EVERY 6 HOURS PRN
Qty: 100 TABLET | Refills: 0 | Status: SHIPPED | OUTPATIENT
Start: 2024-01-25 | End: 2024-02-23 | Stop reason: SDUPTHER

## 2024-01-25 NOTE — LETTER
[facility]  [address] [address]  Phone: [phone]  Fax: [phone]  [facility]: [id number]    Pt Name: Sasha Michelle  Injury Date: 03/15/2001   Employee ID: [id number] Date of Treatment: 01/25/2024   Company: [employer]      Appointment Time: 08:30 AM Arrived: 8:51 AM    Provider: Mendez Montes, DO Time Out:10:03 AM      Office Treatment:   1. Encounter related to worker's compensation claim    2. Sprain of ligaments of lumbar spine, subsequent encounter    3. Chronic bilateral low back pain without sciatica    4. Degeneration of lumbar intervertebral disc    5. Spondylosis of thoracic region without myelopathy or radiculopathy    6. Chronic cervical pain      Medications Ordered This Encounter   Medications    HYDROcodone-acetaminophen (NORCO)  mg per tablet    ibuprofen (ADVIL,MOTRIN) 800 MG tablet    LIDOcaine (LIDODERM) 5 %    tiZANidine (ZANAFLEX) 4 MG tablet                 Return Appointment: 2/23/2024 at 8:30 AM [facility] [place]

## 2024-01-25 NOTE — PROGRESS NOTES
Subjective:      Patient ID: Sasha Michelle is a 73 y.o. female.    Chief Complaint: Back Pain    See MA note.  Follow-up evaluation for chronic lumbar injury.  Sasha has experienced intermittent flare-up of her lower back symptoms due to her efforts to assist her  who is dealing with his own back issues.  However, she continues to get relief with the use of her prescribed medications.  She has requesting a refill of all her medications including lidocaine patches today.     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       Constitution: Positive for activity change. Negative for generalized weakness.   Musculoskeletal:  Positive for pain, joint pain, abnormal ROM of joint, back pain, muscle cramps and muscle ache. Negative for joint swelling.   Neurological:  Negative for loss of balance, numbness and tingling.   Psychiatric/Behavioral:  Positive for sleep disturbance.      Objective:     Physical Exam  Vitals and nursing note reviewed.   Constitutional:       General: She is not in acute distress.  HENT:      Head: Normocephalic.   Eyes:      Conjunctiva/sclera: Conjunctivae normal.   Musculoskeletal:      Cervical back: No pain with movement.      Lumbar back: Tenderness present. No swelling, deformity or spasms. Normal range of motion.        Back:       Comments: No gross deformity noted to the lumbar spine.  Tenderness on palpation to the L5-S1 level and to the right. She describes some discomfort during range of motion assessment but has full range of her lumbar spine otherwise.  Symmetrical strength and reflexes to lower extremities bilaterally.   Skin:     General: Skin is warm.      Capillary Refill: Capillary refill takes  less than 2 seconds.      Findings: No erythema.   Neurological:      Mental Status: She is alert and oriented to person, place, and time.      Gait: Gait is intact.   Psychiatric:         Attention and Perception: Attention normal.         Mood and Affect: Mood and affect normal.         Speech: Speech normal.         Behavior: Behavior normal. Behavior is cooperative    Assessment:      1. Encounter related to worker's compensation claim    2. Sprain of ligaments of lumbar spine, subsequent encounter    3. Chronic bilateral low back pain without sciatica    4. Degeneration of lumbar intervertebral disc    5. Spondylosis of thoracic region without myelopathy or radiculopathy    6. Chronic cervical pain      Plan:   Despite her recent pain flare-up, medications have been helpful and there has no functional deficits on examination.  Refills of the medication have been provided today and I will have her follow up in approximately 1 month.    I spent a total of 25 minutes on the day of the visit.This includes face to face time and non-face to face time preparing to see the patient (eg, review of tests), obtaining and/or reviewing separately obtained history, documenting clinical information in the electronic or other health record, independently interpreting results and communicating results to the patient/family/caregiver, or care coordinator.      Medications Ordered This Encounter   Medications    HYDROcodone-acetaminophen (NORCO)  mg per tablet     Sig: Take 1 tablet by mouth every 6 (six) hours as needed for Pain.     Dispense:  100 tablet     Refill:  0     Quantity prescribed more than 7 day supply? Yes, quantity medically necessary    ibuprofen (ADVIL,MOTRIN) 800 MG tablet     Sig: Take 1 tablet (800 mg total) by mouth 3 (three) times daily. Take medication with food     Dispense:  60 tablet     Refill:  0    LIDOcaine (LIDODERM) 5 %     Sig: Place 1 patch onto the skin once daily. Remove & Discard patch  within 12 hours or as directed by MD     Dispense:  30 patch     Refill:  2    tiZANidine (ZANAFLEX) 4 MG tablet     Si tablet QHS PRN pain/spasm     Dispense:  30 tablet     Refill:  0            Follow up in about 29 days (around 2024).

## 2024-02-23 ENCOUNTER — OFFICE VISIT (OUTPATIENT)
Dept: URGENT CARE | Facility: CLINIC | Age: 74
End: 2024-02-23
Payer: OTHER GOVERNMENT

## 2024-02-23 VITALS
WEIGHT: 148 LBS | HEIGHT: 60 IN | BODY MASS INDEX: 29.06 KG/M2 | HEART RATE: 69 BPM | OXYGEN SATURATION: 99 % | SYSTOLIC BLOOD PRESSURE: 182 MMHG | DIASTOLIC BLOOD PRESSURE: 78 MMHG | RESPIRATION RATE: 20 BRPM

## 2024-02-23 DIAGNOSIS — M54.2 CHRONIC CERVICAL PAIN: ICD-10-CM

## 2024-02-23 DIAGNOSIS — M47.814 SPONDYLOSIS OF THORACIC REGION WITHOUT MYELOPATHY OR RADICULOPATHY: ICD-10-CM

## 2024-02-23 DIAGNOSIS — Z02.6 ENCOUNTER RELATED TO WORKER'S COMPENSATION CLAIM: Primary | ICD-10-CM

## 2024-02-23 DIAGNOSIS — G89.29 CHRONIC BILATERAL LOW BACK PAIN WITHOUT SCIATICA: ICD-10-CM

## 2024-02-23 DIAGNOSIS — S33.5XXD SPRAIN OF LIGAMENTS OF LUMBAR SPINE, SUBSEQUENT ENCOUNTER: ICD-10-CM

## 2024-02-23 DIAGNOSIS — G89.29 CHRONIC CERVICAL PAIN: ICD-10-CM

## 2024-02-23 DIAGNOSIS — M54.50 CHRONIC BILATERAL LOW BACK PAIN WITHOUT SCIATICA: ICD-10-CM

## 2024-02-23 DIAGNOSIS — M51.36 DEGENERATION OF LUMBAR INTERVERTEBRAL DISC: ICD-10-CM

## 2024-02-23 PROCEDURE — 99214 OFFICE O/P EST MOD 30 MIN: CPT | Mod: S$GLB,,,

## 2024-02-23 RX ORDER — HYDROCODONE BITARTRATE AND ACETAMINOPHEN 10; 325 MG/1; MG/1
1 TABLET ORAL EVERY 6 HOURS PRN
Qty: 100 TABLET | Refills: 0 | Status: SHIPPED | OUTPATIENT
Start: 2024-02-23 | End: 2024-03-26 | Stop reason: SDUPTHER

## 2024-02-23 RX ORDER — TIZANIDINE 4 MG/1
TABLET ORAL
Qty: 30 TABLET | Refills: 0 | Status: SHIPPED | OUTPATIENT
Start: 2024-02-23 | End: 2024-04-26 | Stop reason: SDUPTHER

## 2024-02-23 RX ORDER — LIDOCAINE 50 MG/G
1 PATCH TOPICAL DAILY
Qty: 30 PATCH | Refills: 2 | Status: SHIPPED | OUTPATIENT
Start: 2024-02-23 | End: 2024-04-26 | Stop reason: SDUPTHER

## 2024-02-23 NOTE — PROGRESS NOTES
Subjective:      Patient ID: Sasha Michelle is a 73 y.o. female.    Chief Complaint: Back Pain (DOI: 3/15/2001)    See MA note.  Sasha had recent aggravation of her lower back symptoms.  Her  recently had surgery and he nearly fell at home.  She stoop down to help pick him up but this resulted in increased pressure down on her shoulder and back resulting in increased stiffness and pain to her lower back.  She is continued to use her medication which has helped to control the pain but she feels her pain level is slightly worse as a result of the incident.  On a side note she is aware of her high blood pressures working with her treating physician regarding its management.  She states her treating provider checks her blood pressure with a manual cuff and readings are in the normal range.  She denies any symptoms associated with her blood pressure today.  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        Constitution: Positive for activity change. Negative for generalized weakness.   Musculoskeletal:  Positive for pain, joint pain, abnormal ROM of joint, back pain, muscle cramps and muscle ache. Negative for joint swelling.   Neurological:  Negative for loss of balance, numbness and tingling.   Psychiatric/Behavioral:  Positive for sleep disturbance.      Objective:     Physical Exam     Physical Exam  Vitals and nursing note reviewed.   Constitutional:       General: She is not in acute distress.  HENT:      Head: Normocephalic.   Eyes:      Conjunctiva/sclera: Conjunctivae normal.   Musculoskeletal:      Cervical back: No pain with movement.      Lumbar back: Tenderness present. No swelling, deformity or spasms.  Normal range of motion.        Back:       Comments: No gross deformity noted to the lumbar spine.  Tenderness on palpation to the L5-S1 level and to the right. She describes some discomfort during range of motion assessment. Decreased flexion today, but has full range of her lumbar spine in other planes.  Symmetrical strength and reflexes to lower extremities bilaterally.   Skin:     General: Skin is warm.      Capillary Refill: Capillary refill takes less than 2 seconds.      Findings: No erythema.   Neurological:      Mental Status: She is alert and oriented to person, place, and time.      Gait: Gait is intact.   Psychiatric:         Attention and Perception: Attention normal.         Mood and Affect: Mood and affect normal.         Speech: Speech normal.         Behavior: Behavior normal. Behavior is cooperative       Assessment:      1. Encounter related to worker's compensation claim    2. Sprain of ligaments of lumbar spine, subsequent encounter    3. Chronic bilateral low back pain without sciatica    4. Degeneration of lumbar intervertebral disc    5. Spondylosis of thoracic region without myelopathy or radiculopathy    6. Chronic cervical pain      Plan:   She requests refill of her pain medication, lidocaine patches, and bedtime muscle relaxer use.  She is aware to keep up with her medications especially blood pressure medication to help keep her blood pressure under control.  She will continue to monitor her blood pressure as well and keep her scheduled appointments with her treating physician.  I will have her follow up in approximately 1 month for re-evaluation.    I spent a total of 30 minutes on the day of the visit.This includes face to face time and non-face to face time preparing to see the patient (eg, review of tests), obtaining and/or reviewing separately obtained history, documenting clinical information in the electronic or other health record, independently interpreting results and  communicating results to the patient/family/caregiver, or care coordinator.      Medications Ordered This Encounter   Medications    HYDROcodone-acetaminophen (NORCO)  mg per tablet     Sig: Take 1 tablet by mouth every 6 (six) hours as needed for Pain.     Dispense:  100 tablet     Refill:  0     Quantity prescribed more than 7 day supply? Yes, quantity medically necessary    LIDOcaine (LIDODERM) 5 %     Sig: Place 1 patch onto the skin once daily. Remove & Discard patch within 12 hours or as directed by MD     Dispense:  30 patch     Refill:  2    tiZANidine (ZANAFLEX) 4 MG tablet     Si tablet QHS PRN pain/spasm     Dispense:  30 tablet     Refill:  0            Follow up in about 1 month (around 3/26/2024).

## 2024-02-23 NOTE — LETTER
[facility]  [address] [address]  Phone: [phone]  Fax: [phone]  [facility]: [id number]    Pt Name: Sasha Michelle  Injury Date: 03/15/2001   Employee ID: [id number] Date of Treatment: 02/23/2024   Company: [employer]      Appointment Time: 08:30 AM Arrived: 8:50 AM    Provider: Mendez Montes, DO Time Out:10:01 AM      Office Treatment:   1. Encounter related to worker's compensation claim    2. Sprain of ligaments of lumbar spine, subsequent encounter    3. Chronic bilateral low back pain without sciatica    4. Degeneration of lumbar intervertebral disc    5. Spondylosis of thoracic region without myelopathy or radiculopathy    6. Chronic cervical pain      Medications Ordered This Encounter   Medications    HYDROcodone-acetaminophen (NORCO)  mg per tablet    LIDOcaine (LIDODERM) 5 %    tiZANidine (ZANAFLEX) 4 MG tablet                 Return Appointment: 3/26/2024 at 8:30 AM [facility] [place]

## 2024-03-26 ENCOUNTER — OFFICE VISIT (OUTPATIENT)
Dept: URGENT CARE | Facility: CLINIC | Age: 74
End: 2024-03-26
Payer: OTHER GOVERNMENT

## 2024-03-26 VITALS
RESPIRATION RATE: 18 BRPM | OXYGEN SATURATION: 98 % | DIASTOLIC BLOOD PRESSURE: 73 MMHG | HEART RATE: 67 BPM | SYSTOLIC BLOOD PRESSURE: 161 MMHG | WEIGHT: 148 LBS | BODY MASS INDEX: 29.06 KG/M2 | HEIGHT: 60 IN

## 2024-03-26 DIAGNOSIS — G89.29 CHRONIC BILATERAL LOW BACK PAIN WITHOUT SCIATICA: ICD-10-CM

## 2024-03-26 DIAGNOSIS — S33.5XXD SPRAIN OF LIGAMENTS OF LUMBAR SPINE, SUBSEQUENT ENCOUNTER: ICD-10-CM

## 2024-03-26 DIAGNOSIS — Z02.6 ENCOUNTER RELATED TO WORKER'S COMPENSATION CLAIM: Primary | ICD-10-CM

## 2024-03-26 DIAGNOSIS — M54.50 CHRONIC BILATERAL LOW BACK PAIN WITHOUT SCIATICA: ICD-10-CM

## 2024-03-26 PROCEDURE — 99213 OFFICE O/P EST LOW 20 MIN: CPT | Mod: S$GLB,,,

## 2024-03-26 RX ORDER — HYDROCODONE BITARTRATE AND ACETAMINOPHEN 10; 325 MG/1; MG/1
1 TABLET ORAL EVERY 6 HOURS PRN
Qty: 100 TABLET | Refills: 0 | Status: SHIPPED | OUTPATIENT
Start: 2024-03-26 | End: 2024-04-26 | Stop reason: SDUPTHER

## 2024-03-26 NOTE — PROGRESS NOTES
Subjective:      Patient ID: Sasha Michelle is a 73 y.o. female.    Chief Complaint: Back Pain    See MA note. Sasha returns for evaluation of her chronic lower back pain.  Symptoms have been well-controlled with the use of the prescribed medications.  She has been using the same medication regimen for a number of years now.  Today she only request a refill of her hydrocodone medication.  Overall, her lower back symptoms are under control.  Previous visit she had some increased pain to her lower back when she attempted to help her  up from a fall incident.  Today she is improved since that particular incident.  No worsening of her condition.  Medications have otherwise been controlling her symptoms.    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    KW          Constitution: Positive for activity change. Negative for generalized weakness.   Musculoskeletal:  Positive for pain, joint pain, abnormal ROM of joint, back pain, muscle cramps and muscle ache. Negative for joint swelling.   Neurological:  Negative for loss of balance, numbness and tingling.   Psychiatric/Behavioral:  Positive for sleep disturbance.      Objective:     Physical Exam  Vitals and nursing note reviewed.   Constitutional:       General: She is not in acute distress.  HENT:      Head: Normocephalic.   Eyes:      Conjunctiva/sclera: Conjunctivae normal.   Musculoskeletal:      Cervical back: No pain with movement.      Lumbar back: Tenderness present. No swelling, deformity or spasms. Normal range of motion.        Back:       Comments: No gross deformity noted to the lumbar spine.  Tenderness on palpation to the L5-S1 level and to the right. She describes some discomfort during range of motion assessment. Full  range of her lumbar spine in other planes.  Symmetrical strength and reflexes to lower extremities bilaterally.   Skin:     General: Skin is warm.      Capillary Refill: Capillary refill takes less than 2 seconds.      Findings: No erythema.   Neurological:      Mental Status: She is alert and oriented to person, place, and time.      Gait: Gait is intact.   Psychiatric:         Attention and Perception: Attention normal.         Mood and Affect: Mood and affect normal.         Speech: Speech normal.         Behavior: Behavior normal. Behavior is cooperative    Assessment:      1. Encounter related to worker's compensation claim    2. Sprain of ligaments of lumbar spine, subsequent encounter    3. Chronic bilateral low back pain without sciatica      Plan:   Past office visit notes were reviewed today.  Her lower back condition is under control with the prescribed medication regimen.  Thus, I will provide her refill of her requested medication.  She is aware to continue to perform home exercises on a regular basis and will have her follow up in approximately 1 month.    I spent a total of 25 minutes on the day of the visit.This includes face to face time and non-face to face time preparing to see the patient (eg, review of tests), obtaining and/or reviewing separately obtained history, documenting clinical information in the electronic or other health record, independently interpreting results and communicating results to the patient/family/caregiver, or care coordinator.      Medications Ordered This Encounter   Medications    HYDROcodone-acetaminophen (NORCO)  mg per tablet     Sig: Take 1 tablet by mouth every 6 (six) hours as needed for Pain.     Dispense:  100 tablet     Refill:  0     Quantity prescribed more than 7 day supply? Yes, quantity medically necessary            Follow up in about 30 days (around 4/25/2024).

## 2024-03-26 NOTE — LETTER
[facility]  [address] [address]  Phone: [phone]  Fax: [phone]  [facility]: [id number]    Pt Name: Sasha Michelle  Injury Date: 03/15/2001   Employee ID: [id number] Date of Treatment: 03/26/2024   Company: [employer]      Appointment Time: 08:30 AM Arrived: 8:29 AM    Provider: Mendez Montes, DO Time Out: 9:35 AM      Office Treatment:   1. Encounter related to worker's compensation claim    2. Sprain of ligaments of lumbar spine, subsequent encounter    3. Chronic bilateral low back pain without sciatica      Medications Ordered This Encounter   Medications    HYDROcodone-acetaminophen (NORCO)  mg per tablet                 Return Appointment: 4/25/2024 at 8:30 AM [facility] [place]

## 2024-04-26 ENCOUNTER — OFFICE VISIT (OUTPATIENT)
Dept: URGENT CARE | Facility: CLINIC | Age: 74
End: 2024-04-26
Payer: OTHER GOVERNMENT

## 2024-04-26 VITALS
SYSTOLIC BLOOD PRESSURE: 180 MMHG | DIASTOLIC BLOOD PRESSURE: 65 MMHG | HEART RATE: 69 BPM | BODY MASS INDEX: 29.06 KG/M2 | OXYGEN SATURATION: 99 % | WEIGHT: 148 LBS | RESPIRATION RATE: 17 BRPM | HEIGHT: 60 IN

## 2024-04-26 DIAGNOSIS — M47.814 SPONDYLOSIS OF THORACIC REGION WITHOUT MYELOPATHY OR RADICULOPATHY: ICD-10-CM

## 2024-04-26 DIAGNOSIS — M51.36 DEGENERATION OF LUMBAR INTERVERTEBRAL DISC: ICD-10-CM

## 2024-04-26 DIAGNOSIS — M54.2 CHRONIC CERVICAL PAIN: ICD-10-CM

## 2024-04-26 DIAGNOSIS — Z02.6 ENCOUNTER RELATED TO WORKER'S COMPENSATION CLAIM: Primary | ICD-10-CM

## 2024-04-26 DIAGNOSIS — G89.29 CHRONIC CERVICAL PAIN: ICD-10-CM

## 2024-04-26 DIAGNOSIS — M54.50 CHRONIC BILATERAL LOW BACK PAIN WITHOUT SCIATICA: ICD-10-CM

## 2024-04-26 DIAGNOSIS — S33.5XXD SPRAIN OF LIGAMENTS OF LUMBAR SPINE, SUBSEQUENT ENCOUNTER: ICD-10-CM

## 2024-04-26 DIAGNOSIS — G89.29 CHRONIC BILATERAL LOW BACK PAIN WITHOUT SCIATICA: ICD-10-CM

## 2024-04-26 PROCEDURE — 99213 OFFICE O/P EST LOW 20 MIN: CPT | Mod: S$GLB,,,

## 2024-04-26 RX ORDER — LIDOCAINE 50 MG/G
1 PATCH TOPICAL DAILY
Qty: 30 PATCH | Refills: 2 | Status: SHIPPED | OUTPATIENT
Start: 2024-04-26 | End: 2024-05-23 | Stop reason: SDUPTHER

## 2024-04-26 RX ORDER — TIZANIDINE 4 MG/1
TABLET ORAL
Qty: 30 TABLET | Refills: 0 | Status: SHIPPED | OUTPATIENT
Start: 2024-04-26 | End: 2024-05-23

## 2024-04-26 RX ORDER — HYDROCODONE BITARTRATE AND ACETAMINOPHEN 10; 325 MG/1; MG/1
1 TABLET ORAL EVERY 6 HOURS PRN
Qty: 100 TABLET | Refills: 0 | Status: SHIPPED | OUTPATIENT
Start: 2024-04-26 | End: 2024-05-23 | Stop reason: SDUPTHER

## 2024-04-26 NOTE — PROGRESS NOTES
Subjective:      Patient ID: Sasha Michelle is a 73 y.o. female.    Chief Complaint: Back Pain    See MA note. Sasha returns for evaluation of her chronic lower back pain.  Symptoms have been well-controlled with the use of the prescribed medications.  She has been using the same medication regimen for a number of years now.  Today she requests a refill of her hydrocodone, lidocaine patches, and PRN QHS muscle relaxer medications.  Recently I received a letter from the insurance carrier which was inquiring about her continued use of ibuprofen.  In the letter, it was suggested that she either taper off this particular medication or consider transition to Celebrex.  I asked Sasha about her current use of the ibuprofen and she states only rare use of the medication.  She feels she can discontinue use of this medication and does not need Celebrex to help with her symptoms.    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    Pt states that she has been getting muscle spasms that come and go, and has been getting a burning sensation as well.         Constitution: Positive for activity change. Negative for generalized weakness.   Musculoskeletal:  Positive for pain, joint pain, abnormal ROM of joint, back pain, muscle cramps and muscle ache. Negative for joint swelling.   Neurological:  Negative for loss of balance, numbness and tingling.   Psychiatric/Behavioral:  Positive for sleep disturbance.      Objective:     Physical Exam  Vitals and nursing note reviewed.   Constitutional:       General: She is not in acute distress.  HENT:      Head: Normocephalic.   Eyes:      Conjunctiva/sclera: Conjunctivae normal.   Musculoskeletal:      Cervical back: No pain with movement.      Lumbar back: Tenderness present. No swelling,  deformity or spasms. Normal range of motion.        Back:       Comments: No gross deformity noted to the lumbar spine.  Tenderness on palpation to the L5-S1 level and to the right. She describes some discomfort during range of motion assessment. Full range of her lumbar spine in other planes.  Symmetrical strength and reflexes to lower extremities bilaterally.   Skin:     General: Skin is warm.      Capillary Refill: Capillary refill takes less than 2 seconds.      Findings: No erythema.   Neurological:      Mental Status: She is alert and oriented to person, place, and time.      Gait: Gait is intact.   Psychiatric:         Attention and Perception: Attention normal.         Mood and Affect: Mood and affect normal.         Speech: Speech normal.         Behavior: Behavior normal. Behavior is cooperative    Assessment:      1. Encounter related to worker's compensation claim    2. Sprain of ligaments of lumbar spine, subsequent encounter    3. Chronic bilateral low back pain without sciatica    4. Degeneration of lumbar intervertebral disc    5. Spondylosis of thoracic region without myelopathy or radiculopathy    6. Chronic cervical pain      Plan:   Symptoms and clinical examination remain stable with the current medication treatment regimen.  We will discontinue the use of ibuprofen but I will provide her with refills of requested medication.  She is aware to perform her exercises on a regular basis.  I will have her follow up in approximately 1 month but she is aware she can follow up sooner if any issues.    I spent a total of 25 minutes on the day of the visit.This includes face to face time and non-face to face time preparing to see the patient (eg, review of tests), obtaining and/or reviewing separately obtained history, documenting clinical information in the electronic or other health record, independently interpreting results and communicating results to the patient/family/caregiver, or care  coordinator.      Medications Ordered This Encounter   Medications    HYDROcodone-acetaminophen (NORCO)  mg per tablet     Sig: Take 1 tablet by mouth every 6 (six) hours as needed for Pain.     Dispense:  100 tablet     Refill:  0     Quantity prescribed more than 7 day supply? Yes, quantity medically necessary    LIDOcaine (LIDODERM) 5 %     Sig: Place 1 patch onto the skin once daily. Remove & Discard patch within 12 hours or as directed by MD     Dispense:  30 patch     Refill:  2    tiZANidine (ZANAFLEX) 4 MG tablet     Si tablet QHS PRN pain/spasm     Dispense:  30 tablet     Refill:  0            Follow up in about 27 days (around 2024).

## 2024-04-26 NOTE — LETTER
[facility]  [address] [address]  Phone: [phone]  Fax: [phone]  [facility]: [id number]    Pt Name: Sasha Michelle  Injury Date: 03/15/2001   Employee ID: [id number] Date of Treatment: 04/26/2024   Company: [employer]      Appointment Time: 01:15 PM Arrived: 1:07 PM    Provider: Mendez Montes, DO Time Out: 2:16 PM      Office Treatment:   1. Encounter related to worker's compensation claim    2. Sprain of ligaments of lumbar spine, subsequent encounter    3. Chronic bilateral low back pain without sciatica    4. Degeneration of lumbar intervertebral disc    5. Spondylosis of thoracic region without myelopathy or radiculopathy    6. Chronic cervical pain      Medications Ordered This Encounter   Medications    HYDROcodone-acetaminophen (NORCO)  mg per tablet    LIDOcaine (LIDODERM) 5 %    tiZANidine (ZANAFLEX) 4 MG tablet                 Return Appointment: 5/23/2024 at 8:30 AM

## 2024-05-23 ENCOUNTER — OFFICE VISIT (OUTPATIENT)
Dept: URGENT CARE | Facility: CLINIC | Age: 74
End: 2024-05-23
Payer: OTHER GOVERNMENT

## 2024-05-23 VITALS
BODY MASS INDEX: 29.06 KG/M2 | HEART RATE: 70 BPM | DIASTOLIC BLOOD PRESSURE: 82 MMHG | WEIGHT: 148 LBS | HEIGHT: 60 IN | OXYGEN SATURATION: 98 % | SYSTOLIC BLOOD PRESSURE: 175 MMHG | RESPIRATION RATE: 18 BRPM

## 2024-05-23 DIAGNOSIS — M54.50 CHRONIC BILATERAL LOW BACK PAIN WITHOUT SCIATICA: ICD-10-CM

## 2024-05-23 DIAGNOSIS — G89.29 CHRONIC CERVICAL PAIN: ICD-10-CM

## 2024-05-23 DIAGNOSIS — M47.814 SPONDYLOSIS OF THORACIC REGION WITHOUT MYELOPATHY OR RADICULOPATHY: ICD-10-CM

## 2024-05-23 DIAGNOSIS — Z02.6 ENCOUNTER RELATED TO WORKER'S COMPENSATION CLAIM: Primary | ICD-10-CM

## 2024-05-23 DIAGNOSIS — M54.2 CHRONIC CERVICAL PAIN: ICD-10-CM

## 2024-05-23 DIAGNOSIS — G89.29 CHRONIC BILATERAL LOW BACK PAIN WITHOUT SCIATICA: ICD-10-CM

## 2024-05-23 DIAGNOSIS — S33.5XXD SPRAIN OF LIGAMENTS OF LUMBAR SPINE, SUBSEQUENT ENCOUNTER: ICD-10-CM

## 2024-05-23 DIAGNOSIS — M51.36 DEGENERATION OF LUMBAR INTERVERTEBRAL DISC: ICD-10-CM

## 2024-05-23 PROCEDURE — 99213 OFFICE O/P EST LOW 20 MIN: CPT | Mod: S$GLB,,,

## 2024-05-23 RX ORDER — TIZANIDINE 4 MG/1
TABLET ORAL
Qty: 30 TABLET | Refills: 0 | Status: SHIPPED | OUTPATIENT
Start: 2024-05-23

## 2024-05-23 RX ORDER — LIDOCAINE 50 MG/G
1 PATCH TOPICAL DAILY
Qty: 30 PATCH | Refills: 2 | Status: SHIPPED | OUTPATIENT
Start: 2024-05-23

## 2024-05-23 RX ORDER — HYDROCODONE BITARTRATE AND ACETAMINOPHEN 10; 325 MG/1; MG/1
1 TABLET ORAL EVERY 6 HOURS PRN
Qty: 100 TABLET | Refills: 0 | Status: SHIPPED | OUTPATIENT
Start: 2024-05-23

## 2024-05-23 NOTE — PROGRESS NOTES
Subjective:      Patient ID: Sasha Michelle is a 73 y.o. female.    Chief Complaint: Back Pain    See MA note. Sasha returns for evaluation of her chronic lower back pain.  Symptoms have been well-controlled with the use of the prescribed medications.  She has been using the same medication regimen for a number of years now.  Today she requests a refill of her hydrocodone, lidocaine patches, and PRN QHS muscle relaxer medications.     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       Musculoskeletal:  Positive for pain, joint pain, back pain, muscle cramps and muscle ache. Negative for joint swelling and abnormal ROM of joint.   Skin:  Negative for erythema and bruising.   Neurological:  Negative for numbness and tingling.     Objective:     Physical Exam  Vitals and nursing note reviewed.   Constitutional:       General: She is not in acute distress.  HENT:      Head: Normocephalic.   Eyes:      Conjunctiva/sclera: Conjunctivae normal.   Musculoskeletal:      Cervical back: No pain with movement.      Lumbar back: Tenderness present. No swelling, deformity or spasms. Normal range of motion.        Back:       Comments: No gross deformity noted to the lumbar spine.  Tenderness on palpation to the L5-S1 level and to the right. She describes some discomfort during range of motion assessment. Full range of her lumbar spine in other planes.  Symmetrical strength and reflexes to lower extremities bilaterally.   Skin:     General: Skin is warm.      Capillary Refill: Capillary refill takes less than 2 seconds.      Findings: No erythema.   Neurological:      Mental Status: She is alert and oriented to person, place, and time.      Gait: Gait is intact.   Psychiatric:          Attention and Perception: Attention normal.         Mood and Affect: Mood and affect normal.         Speech: Speech normal.         Behavior: Behavior normal. Behavior is cooperative    Assessment:      1. Encounter related to worker's compensation claim    2. Sprain of ligaments of lumbar spine, subsequent encounter    3. Chronic bilateral low back pain without sciatica    4. Degeneration of lumbar intervertebral disc    5. Spondylosis of thoracic region without myelopathy or radiculopathy    6. Chronic cervical pain      Plan:     Her lower back condition remained stable with the use of the prescribed medication.  I will provide her with her refills and scheduled follow up appointment in approximately 1 month.  He is aware that I am leaving the organization and that Dr. Kumar  will assume care moving forward.    I spent a total of 20 minutes on the day of the visit.This includes face to face time and non-face to face time preparing to see the patient (eg, review of tests), obtaining and/or reviewing separately obtained history, documenting clinical information in the electronic or other health record, independently interpreting results and communicating results to the patient/family/caregiver, or care coordinator.      Medications Ordered This Encounter   Medications    HYDROcodone-acetaminophen (NORCO)  mg per tablet     Sig: Take 1 tablet by mouth every 6 (six) hours as needed for Pain.     Dispense:  100 tablet     Refill:  0     Quantity prescribed more than 7 day supply? Yes, quantity medically necessary    LIDOcaine (LIDODERM) 5 %     Sig: Place 1 patch onto the skin once daily. Remove & Discard patch within 12 hours or as directed by MD     Dispense:  30 patch     Refill:  2    tiZANidine (ZANAFLEX) 4 MG tablet     Si-2 tablet QHS PRN pain/spasm     Dispense:  30 tablet     Refill:  0            Follow up in about 4 weeks (around 2024).

## 2024-05-23 NOTE — LETTER
[facility]  [address] [address]  Phone: [phone]  Fax: [phone]  [facility]: [id number]    Pt Name: Sasha Michelle  Injury Date: 03/15/2001   Employee ID: [id number] Date of Treatment: 05/23/2024   Company: [employer]      Appointment Time: 08:20 AM Arrived: 9:15 am   Provider: Mendez Montes, DO Time Out:10:40 am     Office Treatment:   1. Encounter related to worker's compensation claim    2. Sprain of ligaments of lumbar spine, subsequent encounter    3. Chronic bilateral low back pain without sciatica    4. Degeneration of lumbar intervertebral disc    5. Spondylosis of thoracic region without myelopathy or radiculopathy    6. Chronic cervical pain      Medications Ordered This Encounter   Medications    HYDROcodone-acetaminophen (NORCO)  mg per tablet    LIDOcaine (LIDODERM) 5 %    tiZANidine (ZANAFLEX) 4 MG tablet                 Return Appointment: 6/20/2024 at 8:30 AM

## 2024-06-25 ENCOUNTER — OFFICE VISIT (OUTPATIENT)
Dept: URGENT CARE | Facility: CLINIC | Age: 74
End: 2024-06-25
Payer: OTHER GOVERNMENT

## 2024-06-25 VITALS
TEMPERATURE: 98 F | HEIGHT: 60 IN | BODY MASS INDEX: 29.25 KG/M2 | DIASTOLIC BLOOD PRESSURE: 83 MMHG | WEIGHT: 149 LBS | RESPIRATION RATE: 18 BRPM | SYSTOLIC BLOOD PRESSURE: 196 MMHG | HEART RATE: 67 BPM | OXYGEN SATURATION: 100 %

## 2024-06-25 DIAGNOSIS — G89.29 CHRONIC CERVICAL PAIN: ICD-10-CM

## 2024-06-25 DIAGNOSIS — S33.5XXD SPRAIN OF LIGAMENTS OF LUMBAR SPINE, SUBSEQUENT ENCOUNTER: ICD-10-CM

## 2024-06-25 DIAGNOSIS — M47.814 SPONDYLOSIS OF THORACIC REGION WITHOUT MYELOPATHY OR RADICULOPATHY: ICD-10-CM

## 2024-06-25 DIAGNOSIS — Z02.6 ENCOUNTER RELATED TO WORKER'S COMPENSATION CLAIM: Primary | ICD-10-CM

## 2024-06-25 DIAGNOSIS — M54.50 CHRONIC BILATERAL LOW BACK PAIN WITHOUT SCIATICA: ICD-10-CM

## 2024-06-25 DIAGNOSIS — G89.29 ENCOUNTER FOR CHRONIC PAIN MANAGEMENT: ICD-10-CM

## 2024-06-25 DIAGNOSIS — G89.29 CHRONIC BILATERAL LOW BACK PAIN WITHOUT SCIATICA: ICD-10-CM

## 2024-06-25 DIAGNOSIS — M51.36 DEGENERATION OF LUMBAR INTERVERTEBRAL DISC: ICD-10-CM

## 2024-06-25 DIAGNOSIS — M54.2 CHRONIC CERVICAL PAIN: ICD-10-CM

## 2024-06-25 PROBLEM — K21.9 GASTROESOPHAGEAL REFLUX DISEASE WITHOUT ESOPHAGITIS: Status: ACTIVE | Noted: 2024-06-25

## 2024-06-25 PROCEDURE — 99214 OFFICE O/P EST MOD 30 MIN: CPT | Mod: S$GLB,,, | Performed by: SURGERY

## 2024-06-25 RX ORDER — HYDROCODONE BITARTRATE AND ACETAMINOPHEN 10; 325 MG/1; MG/1
1 TABLET ORAL EVERY 6 HOURS PRN
Qty: 100 TABLET | Refills: 0 | Status: SHIPPED | OUTPATIENT
Start: 2024-06-25

## 2024-06-25 RX ORDER — TIZANIDINE 4 MG/1
TABLET ORAL
Qty: 30 TABLET | Refills: 1 | Status: SHIPPED | OUTPATIENT
Start: 2024-06-25

## 2024-06-25 NOTE — LETTER
[facility]  [address] [facility] [address]  Phone: [phone]  Fax: [phone]  [facility]: [id number]    Pt Name: Sasha John Date: 03/15/2003   Employee ID: [id number] Date of Treatment: 06/25/2024   Company: [employer]      Appointment Time: 08:30 AM Arrived: 8:28 AM   Provider: Lazaro Kumar MD Time Out:9:12 AM     Office Treatment:   1. Encounter related to worker's compensation claim    2. Sprain of ligaments of lumbar spine, subsequent encounter    3. Chronic bilateral low back pain without sciatica    4. Degeneration of lumbar intervertebral disc    5. Spondylosis of thoracic region without myelopathy or radiculopathy    6. Chronic cervical pain    7. Encounter for chronic pain management      Medications Ordered This Encounter   Medications    HYDROcodone-acetaminophen (NORCO)  mg per tablet    tiZANidine (ZANAFLEX) 4 MG tablet                 Return Appointment: 7/25/2024 at 8:30 AM KASSY

## 2024-06-25 NOTE — PROGRESS NOTES
Subjective:      Patient ID: Sasha Michelle is a 73 y.o. female.    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 The current episode started more than 1 year ago. The problem occurs intermittently. The problem is unchanged. The quality of the pain is described as burning. The pain is at a severity of 5/10. The pain is moderate. The pain is Worse during the day. The symptoms are aggravated by sitting.       Musculoskeletal:  Positive for back pain.       See MA note above. Begin MD note:    Sasha Michelle is a 73 y.o. presenting for follow-up of chronic lumbar and thoracic pain, medication management. Long-term patient of occupational health due to a work injury that occurred in 2001. She lifted a heavy tray of mail and twisting to place the tray into her vehicle when she developed pain to her back.  Treatment has included physical therapy, multiple DOTTIE injections, and medication use. She reports that as DOTTIE didn't provide sustained pain control her chronic pain has been managed with medications and HEP. Current regimen of Norco PRN, lidocaine patches, and nightly zanaflex helps to control her pain and allow her to lead the active lifestyle she engages in. She typically uses all of the Norco and zanaflex in 1 month. She uses the lidocaine patches for travel to address the burning pain she gets with sitting for prolonged periods. Notes that she has pain now from the drive to the clinic.     She reports that she has been having elevated blood pressure despite medication use and her PCP is adjusting her medications.     Objective:     Physical Exam  Vitals and nursing note reviewed.   Constitutional:       General: She is not in acute distress.  HENT:       Head: Normocephalic.   Eyes:      Conjunctiva/sclera: Conjunctivae normal.   Musculoskeletal:      Cervical back: No pain with movement.      Lumbar back: Tenderness present. No swelling or deformity. Decreased range of motion.        Back:       Comments: Able to rise from seated without difficulty or requiring assistance. No gross deformity noted to the lumbar spine.  Tender to palpation of distal lumbar spine. Decreased ROM of lumbar spine.  Normal gait.   Skin:     General: Skin is warm.      Capillary Refill: Capillary refill takes less than 2 seconds.   Neurological:      General: No focal deficit present.      Mental Status: She is alert and oriented to person, place, and time.   Psychiatric:         Attention and Perception: Attention normal.         Mood and Affect: Mood and affect normal.         Speech: Speech normal.         Behavior: Behavior normal. Behavior is cooperative.          Assessment:      1. Encounter related to worker's compensation claim    2. Sprain of ligaments of lumbar spine, subsequent encounter    3. Chronic bilateral low back pain without sciatica    4. Degeneration of lumbar intervertebral disc    5. Spondylosis of thoracic region without myelopathy or radiculopathy    6. Chronic cervical pain    7. Encounter for chronic pain management      Plan:     I reviewed the clinic notes related to this injury, management of chronic back pain from work injury. Stable on current regiment. Refills provided. Follow-up in 1 month.     Medications Ordered This Encounter   Medications    HYDROcodone-acetaminophen (NORCO)  mg per tablet     Sig: Take 1 tablet by mouth every 6 (six) hours as needed for Pain.     Dispense:  100 tablet     Refill:  0     Quantity prescribed more than 7 day supply? Yes, quantity medically necessary    tiZANidine (ZANAFLEX) 4 MG tablet     Si-2 tablet QHS PRN pain/spasm     Dispense:  30 tablet     Refill:  1     Diagnoses and plan discussed with the  patient, as well as the expected course and duration of symptoms. Risks and benefits of any medication prescribed during this visit was explained, verbal instructions on use given. Clinic/Emergency department return precautions were given, can return to [facility] before scheduled follow-up appointment if notes worsening/aggravation of symptoms. All questions and concerns were addressed prior to discharge. Plan was developed with active input from the patient and they verbalized understanding of and agreement with the POC.  [facility] was informed of any referrals and relevant orders.  Note was dictated with voice recognition software, please excuse any grammatical errors.    I spent a total of 30 minutes on the day of the visit.  This includes face to face time and non-face to face time preparing to see the patient (e.g. review of medical record), obtaining and/or reviewing separately obtained history, documenting clinical information in the electronic or other health record, independently interpreting results and communicating results to the patient/family/caregiver, or care coordinator.           Follow up in about 1 month (around 7/25/2024).

## 2024-07-25 ENCOUNTER — OFFICE VISIT (OUTPATIENT)
Dept: URGENT CARE | Facility: CLINIC | Age: 74
End: 2024-07-25
Payer: OTHER GOVERNMENT

## 2024-07-25 VITALS
HEART RATE: 58 BPM | BODY MASS INDEX: 29.25 KG/M2 | HEIGHT: 60 IN | SYSTOLIC BLOOD PRESSURE: 159 MMHG | DIASTOLIC BLOOD PRESSURE: 73 MMHG | WEIGHT: 149 LBS | OXYGEN SATURATION: 98 % | RESPIRATION RATE: 18 BRPM

## 2024-07-25 DIAGNOSIS — M47.814 SPONDYLOSIS OF THORACIC REGION WITHOUT MYELOPATHY OR RADICULOPATHY: ICD-10-CM

## 2024-07-25 DIAGNOSIS — S33.5XXD SPRAIN OF LIGAMENTS OF LUMBAR SPINE, SUBSEQUENT ENCOUNTER: ICD-10-CM

## 2024-07-25 DIAGNOSIS — G89.29 ENCOUNTER FOR CHRONIC PAIN MANAGEMENT: Primary | ICD-10-CM

## 2024-07-25 DIAGNOSIS — Z02.6 ENCOUNTER RELATED TO WORKER'S COMPENSATION CLAIM: ICD-10-CM

## 2024-07-25 DIAGNOSIS — G89.29 CHRONIC BILATERAL LOW BACK PAIN WITHOUT SCIATICA: ICD-10-CM

## 2024-07-25 DIAGNOSIS — M54.50 CHRONIC BILATERAL LOW BACK PAIN WITHOUT SCIATICA: ICD-10-CM

## 2024-07-25 DIAGNOSIS — M51.36 DEGENERATION OF LUMBAR INTERVERTEBRAL DISC: ICD-10-CM

## 2024-07-25 DIAGNOSIS — G89.29 CHRONIC CERVICAL PAIN: ICD-10-CM

## 2024-07-25 DIAGNOSIS — M54.2 CHRONIC CERVICAL PAIN: ICD-10-CM

## 2024-07-25 RX ORDER — HYDROCODONE BITARTRATE AND ACETAMINOPHEN 5; 325 MG/1; MG/1
1 TABLET ORAL EVERY 6 HOURS PRN
Qty: 100 TABLET | Refills: 0 | Status: SHIPPED | OUTPATIENT
Start: 2024-08-25 | End: 2024-09-24

## 2024-07-25 RX ORDER — TIZANIDINE 4 MG/1
TABLET ORAL
Qty: 30 TABLET | Refills: 1 | Status: SHIPPED | OUTPATIENT
Start: 2024-07-25

## 2024-07-25 RX ORDER — METOPROLOL SUCCINATE 50 MG/1
50 TABLET, EXTENDED RELEASE ORAL
COMMUNITY
Start: 2024-06-21

## 2024-07-25 RX ORDER — UMECLIDINIUM BROMIDE AND VILANTEROL TRIFENATATE 62.5; 25 UG/1; UG/1
1 POWDER RESPIRATORY (INHALATION)
COMMUNITY
Start: 2024-07-19

## 2024-07-25 RX ORDER — LIDOCAINE 50 MG/G
1 PATCH TOPICAL DAILY
Qty: 30 PATCH | Refills: 2 | Status: SHIPPED | OUTPATIENT
Start: 2024-07-25

## 2024-07-25 RX ORDER — HYDROCODONE BITARTRATE AND ACETAMINOPHEN 10; 325 MG/1; MG/1
1 TABLET ORAL EVERY 6 HOURS PRN
Qty: 100 TABLET | Refills: 0 | Status: SHIPPED | OUTPATIENT
Start: 2024-07-25 | End: 2024-08-24

## 2024-07-25 NOTE — LETTER
[facility]  [address] [address]  Phone: [phone]  Fax: [phone]  [facility]: [id number]    Pt Name: Sasha John Date: 03/15/2003   Employee ID: [id number] Date of Treatment: 07/25/2024   Company: [employer]      Appointment Time: 08:30 AM Arrived: 8:33 AM    Provider: Lazaro Kumar MD Time Out:9:20 AM      Office Treatment:   1. Encounter for chronic pain management    2. Chronic bilateral low back pain without sciatica    3. Degeneration of lumbar intervertebral disc    4. Spondylosis of thoracic region without myelopathy or radiculopathy    5. Encounter related to worker's compensation claim    6. Sprain of ligaments of lumbar spine, subsequent encounter    7. Chronic cervical pain      Medications Ordered This Encounter   Medications    HYDROcodone-acetaminophen (NORCO)  mg per tablet    HYDROcodone-acetaminophen (NORCO) 5-325 mg per tablet    LIDOcaine (LIDODERM) 5 %    tiZANidine (ZANAFLEX) 4 MG tablet           Restrictions:  (Retired)     Return Appointment: 9/25/2024 at 8:30 AM KASSY

## 2024-07-25 NOTE — PROGRESS NOTES
Subjective:      Patient ID: Sasha Michelle is a 73 y.o. female.    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       Musculoskeletal:  Positive for pain, joint pain, back pain, muscle cramps and muscle ache. Negative for trauma, joint swelling and abnormal ROM of joint.   Skin:  Negative for erythema and bruising.   Neurological:  Negative for numbness and tingling.       See MA note above. Begin MD note:    Sasha Michelle is a 73 y.o. presenting for follow-up of chronic lumbar and thoracic pain, medication management. [place] . She has not yet gotten back into her regular activities, hoping to start back in the gym on Monday. She has been having SOB, so her PCP had echo and PFTs performed to assess. They note it may be due to her increased weight as she currently weighs about 150 lbs which is the heaviest she has ever been.   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   Pain is largely unchanged, it gets aggravated with long trips where is cannot move around much so she uses 3 lidocaine patches at a time to address the bilateral lower back pain and the burning pain of the right thoracic region.    Objective:     Physical Exam  Vitals and nursing note reviewed.   Constitutional:       General: She is not in acute distress.  HENT:      Head: Normocephalic.   Eyes:      Conjunctiva/sclera: Conjunctivae normal.   Musculoskeletal:      Cervical back: No pain with movement.      Lumbar back: Tenderness present. No swelling or  deformity. Decreased range of motion.        Back:       Comments: Able to rise from seated without difficulty or requiring assistance. No gross deformity noted to the lumbar spine.  Tender to palpation of distal lumbar spine. Decreased ROM of lumbar spine on forward flexion, slightly improved from prior.  Normal gait.   Skin:     General: Skin is warm.      Capillary Refill: Capillary refill takes less than 2 seconds.      Findings: No erythema.   Neurological:      General: No focal deficit present.      Mental Status: She is alert and oriented to person, place, and time.   Psychiatric:         Attention and Perception: Attention normal.         Mood and Affect: Mood and affect normal.         Speech: Speech normal.         Behavior: Behavior normal. Behavior is cooperative.        Assessment:      1. Encounter for chronic pain management    2. Chronic bilateral low back pain without sciatica    3. Degeneration of lumbar intervertebral disc    4. Spondylosis of thoracic region without myelopathy or radiculopathy    5. Encounter related to worker's compensation claim    6. Sprain of ligaments of lumbar spine, subsequent encounter    7. Chronic cervical pain      Plan:     Recommended trying aquatic exercise as tends to be better for joints. Refills provided, one post-dated. We will follow-up in 2 months for continued management.     Medications Ordered This Encounter   Medications    HYDROcodone-acetaminophen (NORCO)  mg per tablet     Sig: Take 1 tablet by mouth every 6 (six) hours as needed for Pain.     Dispense:  100 tablet     Refill:  0     Quantity prescribed more than 7 day supply? Yes, quantity medically necessary    HYDROcodone-acetaminophen (NORCO) 5-325 mg per tablet     Sig: Take 1 tablet by mouth every 6 (six) hours as needed for Pain.     Dispense:  100 tablet     Refill:  0     Quantity prescribed more than 7 day supply? Yes, quantity medically necessary     Order Specific Question:   I have  reviewed the Prescription Drug Monitoring Program (PDMP) database for this patient prior to prescribing the above opioid medication     Answer:   Yes    LIDOcaine (LIDODERM) 5 %     Sig: Place 1 patch onto the skin once daily. Remove & Discard patch within 12 hours or as directed by MD     Dispense:  30 patch     Refill:  2    tiZANidine (ZANAFLEX) 4 MG tablet     Si-2 tablet QHS PRN pain/spasm     Dispense:  30 tablet     Refill:  1     Diagnoses and plan discussed with the patient, as well as the expected course and duration of symptoms. Risks and benefits of any medication prescribed during this visit was explained, verbal instructions on use given. Clinic/Emergency department return precautions were given, can return to [facility] before scheduled follow-up appointment if notes worsening/aggravation of symptoms. All questions and concerns were addressed prior to discharge. Plan was developed with active input from the patient and they verbalized understanding of and agreement with the POC.  C was informed of any referrals and relevant orders.  Note was dictated with voice recognition software, please excuse any grammatical errors.    I spent a total of 25 minutes on the day of the visit.  This includes face to face time and non-face to face time preparing to see the patient (e.g. review of medical record), obtaining and/or reviewing separately obtained history, documenting clinical information in the electronic or other health record, independently interpreting results and communicating results to the patient/family/caregiver, or care coordinator.        Restrictions:  (Retired)  Follow up in about 2 months (around 2024).

## 2024-08-19 ENCOUNTER — OFFICE VISIT (OUTPATIENT)
Dept: URGENT CARE | Facility: CLINIC | Age: 74
End: 2024-08-19
Payer: MEDICARE

## 2024-08-19 VITALS
SYSTOLIC BLOOD PRESSURE: 110 MMHG | HEART RATE: 48 BPM | WEIGHT: 149.06 LBS | BODY MASS INDEX: 29.26 KG/M2 | DIASTOLIC BLOOD PRESSURE: 80 MMHG | HEIGHT: 60 IN | OXYGEN SATURATION: 97 % | TEMPERATURE: 98 F | RESPIRATION RATE: 18 BRPM

## 2024-08-19 DIAGNOSIS — J04.0 LARYNGITIS: ICD-10-CM

## 2024-08-19 DIAGNOSIS — J02.9 SORE THROAT: Primary | ICD-10-CM

## 2024-08-19 LAB
CTP QC/QA: YES
MOLECULAR STREP A: NEGATIVE

## 2024-08-19 PROCEDURE — 99203 OFFICE O/P NEW LOW 30 MIN: CPT | Mod: 25,S$GLB,, | Performed by: PHYSICIAN ASSISTANT

## 2024-08-19 PROCEDURE — 96372 THER/PROPH/DIAG INJ SC/IM: CPT | Mod: S$GLB,,, | Performed by: FAMILY MEDICINE

## 2024-08-19 PROCEDURE — 87651 STREP A DNA AMP PROBE: CPT | Mod: QW,S$GLB,, | Performed by: PHYSICIAN ASSISTANT

## 2024-08-19 RX ORDER — ALBUTEROL SULFATE 90 UG/1
2 INHALANT RESPIRATORY (INHALATION) EVERY 4 HOURS PRN
Qty: 18 G | Refills: 0 | Status: SHIPPED | OUTPATIENT
Start: 2024-08-19

## 2024-08-19 RX ORDER — PREDNISONE 20 MG/1
20 TABLET ORAL DAILY
Qty: 2 TABLET | Refills: 0 | Status: SHIPPED | OUTPATIENT
Start: 2024-08-19

## 2024-08-19 RX ORDER — BETAMETHASONE SODIUM PHOSPHATE AND BETAMETHASONE ACETATE 3; 3 MG/ML; MG/ML
6 INJECTION, SUSPENSION INTRA-ARTICULAR; INTRALESIONAL; INTRAMUSCULAR; SOFT TISSUE
Status: COMPLETED | OUTPATIENT
Start: 2024-08-19 | End: 2024-08-19

## 2024-08-19 RX ADMIN — BETAMETHASONE SODIUM PHOSPHATE AND BETAMETHASONE ACETATE 6 MG: 3; 3 INJECTION, SUSPENSION INTRA-ARTICULAR; INTRALESIONAL; INTRAMUSCULAR; SOFT TISSUE at 10:08

## 2024-08-19 NOTE — PROGRESS NOTES
Subjective:      Patient ID: Sasha Michelle is a 73 y.o. female.    Vitals:  height is 5' (1.524 m) and weight is 67.6 kg (149 lb 0.5 oz). Her oral temperature is 98 °F (36.7 °C). Her blood pressure is 110/80 and her pulse is 48 (abnormal). Her respiration is 18 and oxygen saturation is 97%.     Chief Complaint: Sore Throat    Pt complains of sore throat and cough that started 9 days ago. Pt denies fever or nasal congestion.  No fever chills nausea vomiting diarrhea    Sore Throat   This is a new problem. Episode onset: 9 days ago. The problem has been unchanged. The pain is worse on the right side. There has been no fever. The pain is at a severity of 6/10. The pain is moderate. Associated symptoms include coughing. Pertinent negatives include no congestion, diarrhea or vomiting. She has tried gargles (cough drops, allergy med) for the symptoms. The treatment provided mild relief.       Constitution: Negative for fever.   HENT:  Positive for sore throat. Negative for congestion.    Respiratory:  Positive for cough. Negative for sputum production.    Gastrointestinal:  Negative for nausea, vomiting and diarrhea.   Skin:  Negative for erythema.      Objective:     Physical Exam   Constitutional: She is oriented to person, place, and time. She appears well-developed. She is cooperative.  Non-toxic appearance. She does not appear ill. No distress.   HENT:   Head: Normocephalic and atraumatic.   Ears:   Right Ear: Hearing and external ear normal.   Left Ear: Hearing and external ear normal.   Nose: Nose normal. No mucosal edema, rhinorrhea, nasal deformity or congestion. No epistaxis. Right sinus exhibits no maxillary sinus tenderness and no frontal sinus tenderness. Left sinus exhibits no maxillary sinus tenderness and no frontal sinus tenderness.   Mouth/Throat: Uvula is midline and mucous membranes are normal. No trismus in the jaw. Normal dentition. No uvula swelling. Posterior oropharyngeal erythema present. No  posterior oropharyngeal edema.   Eyes: Conjunctivae, EOM and lids are normal. Pupils are equal, round, and reactive to light. Right eye exhibits no discharge. Left eye exhibits no discharge. No scleral icterus.   Neck: Trachea normal and phonation normal. Neck supple. No JVD present. No tracheal deviation present. No thyromegaly present. No edema present. No erythema present. No neck rigidity present.   Cardiovascular: Normal rate, regular rhythm and normal pulses.   Pulmonary/Chest: Effort normal. No stridor. No respiratory distress. She has no decreased breath sounds. She has no wheezes. She has no rhonchi.   Abdominal: Normal appearance. She exhibits no distension. Soft. There is no rebound.   Musculoskeletal: Normal range of motion.         General: No deformity. Normal range of motion.   Neurological: She is alert and oriented to person, place, and time. She exhibits normal muscle tone. Coordination normal.   Skin: Skin is warm, dry, intact, not diaphoretic, not pale and no rash. Capillary refill takes less than 2 seconds. No erythema   Psychiatric: Her speech is normal and behavior is normal. Judgment and thought content normal.   Nursing note and vitals reviewed.    Results for orders placed or performed in visit on 08/19/24   POCT Strep A, Molecular   Result Value Ref Range    Molecular Strep A, POC Negative Negative     Acceptable Yes     No results found.     Assessment:     1. Sore throat    2. Laryngitis        Plan:       Sore throat  -     POCT Strep A, Molecular  -     predniSONE (DELTASONE) 20 MG tablet; Take 1 tablet (20 mg total) by mouth once daily.  Dispense: 2 tablet; Refill: 0  -     albuterol (VENTOLIN HFA) 90 mcg/actuation inhaler; Inhale 2 puffs into the lungs every 4 (four) hours as needed for Wheezing or Shortness of Breath (cough). Rescue  Dispense: 18 g; Refill: 0    Laryngitis  -     betamethasone acetate-betamethasone sodium phosphate injection 6 mg  -     predniSONE  (DELTASONE) 20 MG tablet; Take 1 tablet (20 mg total) by mouth once daily.  Dispense: 2 tablet; Refill: 0  -     albuterol (VENTOLIN HFA) 90 mcg/actuation inhaler; Inhale 2 puffs into the lungs every 4 (four) hours as needed for Wheezing or Shortness of Breath (cough). Rescue  Dispense: 18 g; Refill: 0      Follow up if symptoms worsen or fail to improve, for F/U with PCP or ED. There are no Patient Instructions on file for this visit.

## 2024-08-27 ENCOUNTER — TELEPHONE (OUTPATIENT)
Dept: URGENT CARE | Facility: CLINIC | Age: 74
End: 2024-08-27
Payer: MEDICARE

## 2024-08-27 DIAGNOSIS — G89.29 CHRONIC BILATERAL LOW BACK PAIN WITHOUT SCIATICA: Primary | ICD-10-CM

## 2024-08-27 DIAGNOSIS — M47.814 SPONDYLOSIS OF THORACIC REGION WITHOUT MYELOPATHY OR RADICULOPATHY: ICD-10-CM

## 2024-08-27 DIAGNOSIS — S33.5XXD SPRAIN OF LIGAMENTS OF LUMBAR SPINE, SUBSEQUENT ENCOUNTER: ICD-10-CM

## 2024-08-27 DIAGNOSIS — M54.2 CHRONIC CERVICAL PAIN: ICD-10-CM

## 2024-08-27 DIAGNOSIS — M54.50 CHRONIC BILATERAL LOW BACK PAIN WITHOUT SCIATICA: Primary | ICD-10-CM

## 2024-08-27 DIAGNOSIS — G89.29 CHRONIC CERVICAL PAIN: ICD-10-CM

## 2024-08-27 DIAGNOSIS — Z02.6 ENCOUNTER RELATED TO WORKER'S COMPENSATION CLAIM: ICD-10-CM

## 2024-08-27 DIAGNOSIS — G89.29 ENCOUNTER FOR CHRONIC PAIN MANAGEMENT: ICD-10-CM

## 2024-08-27 DIAGNOSIS — M51.36 DEGENERATION OF LUMBAR INTERVERTEBRAL DISC: ICD-10-CM

## 2024-08-27 RX ORDER — HYDROCODONE BITARTRATE AND ACETAMINOPHEN 10; 325 MG/1; MG/1
1 TABLET ORAL EVERY 6 HOURS PRN
Qty: 100 TABLET | Refills: 0 | Status: SHIPPED | OUTPATIENT
Start: 2024-08-27

## 2024-08-27 NOTE — TELEPHONE ENCOUNTER
Patient called to get an update on her medication refill request from Dr. Kumar. I placed the patient on hold and had to gather information and she hung up the phone. I'm guessing because of the time frame it took for me to get the information for her. I did call the patient back to inform her of the information and there was no abswer, I left a voice message for her. AFG

## 2024-09-27 ENCOUNTER — OFFICE VISIT (OUTPATIENT)
Dept: URGENT CARE | Facility: CLINIC | Age: 74
End: 2024-09-27
Payer: OTHER GOVERNMENT

## 2024-09-27 VITALS
HEIGHT: 60 IN | SYSTOLIC BLOOD PRESSURE: 175 MMHG | HEART RATE: 73 BPM | TEMPERATURE: 98 F | WEIGHT: 149 LBS | OXYGEN SATURATION: 98 % | BODY MASS INDEX: 29.25 KG/M2 | RESPIRATION RATE: 18 BRPM | DIASTOLIC BLOOD PRESSURE: 76 MMHG

## 2024-09-27 DIAGNOSIS — M54.50 CHRONIC BILATERAL LOW BACK PAIN WITHOUT SCIATICA: ICD-10-CM

## 2024-09-27 DIAGNOSIS — G89.29 ENCOUNTER FOR CHRONIC PAIN MANAGEMENT: Primary | ICD-10-CM

## 2024-09-27 DIAGNOSIS — Z02.6 ENCOUNTER RELATED TO WORKER'S COMPENSATION CLAIM: ICD-10-CM

## 2024-09-27 DIAGNOSIS — G89.29 CHRONIC CERVICAL PAIN: ICD-10-CM

## 2024-09-27 DIAGNOSIS — M47.814 SPONDYLOSIS OF THORACIC REGION WITHOUT MYELOPATHY OR RADICULOPATHY: ICD-10-CM

## 2024-09-27 DIAGNOSIS — M51.36 DEGENERATION OF LUMBAR INTERVERTEBRAL DISC: ICD-10-CM

## 2024-09-27 DIAGNOSIS — M54.2 CHRONIC CERVICAL PAIN: ICD-10-CM

## 2024-09-27 DIAGNOSIS — G89.29 CHRONIC BILATERAL LOW BACK PAIN WITHOUT SCIATICA: ICD-10-CM

## 2024-09-27 DIAGNOSIS — S33.5XXD SPRAIN OF LIGAMENTS OF LUMBAR SPINE, SUBSEQUENT ENCOUNTER: ICD-10-CM

## 2024-09-27 RX ORDER — TIZANIDINE 4 MG/1
TABLET ORAL
Qty: 30 TABLET | Refills: 1 | Status: SHIPPED | OUTPATIENT
Start: 2024-09-27

## 2024-09-27 RX ORDER — HYDROCODONE BITARTRATE AND ACETAMINOPHEN 10; 325 MG/1; MG/1
1 TABLET ORAL EVERY 6 HOURS PRN
Qty: 100 TABLET | Refills: 0 | Status: SHIPPED | OUTPATIENT
Start: 2024-10-27

## 2024-09-27 RX ORDER — LIDOCAINE 50 MG/G
1 PATCH TOPICAL DAILY
Qty: 30 PATCH | Refills: 2 | Status: SHIPPED | OUTPATIENT
Start: 2024-09-27

## 2024-09-27 RX ORDER — HYDROCODONE BITARTRATE AND ACETAMINOPHEN 10; 325 MG/1; MG/1
1 TABLET ORAL EVERY 6 HOURS PRN
Qty: 100 TABLET | Refills: 0 | Status: SHIPPED | OUTPATIENT
Start: 2024-09-27

## 2024-09-27 NOTE — LETTER
[facility]  [address] [address]  Phone: [phone]  Fax: [phone]  [facility]: [id number]    Pt Name: Sasha John Date: 03/15/2003   Employee ID: [id number] Date of Treatment: 09/27/2024   Company: [employer]      Appointment Time: 08:15 AM Arrived: 8:30 AM   Provider: Lazaro Kumar MD Time Out:9:15 AM     Office Treatment:   1. Encounter for chronic pain management    2. Encounter related to worker's compensation claim    3. Chronic bilateral low back pain without sciatica    4. Degeneration of lumbar intervertebral disc    5. Spondylosis of thoracic region without myelopathy or radiculopathy    6. Sprain of ligaments of lumbar spine, subsequent encounter    7. Chronic cervical pain      Medications Ordered This Encounter   Medications    HYDROcodone-acetaminophen (NORCO)  mg per tablet    HYDROcodone-acetaminophen (NORCO)  mg per tablet    LIDOcaine (LIDODERM) 5 %    tiZANidine (ZANAFLEX) 4 MG tablet           Restrictions:  (Retired)     Return Appointment: 12/6/2024 at 8:30 AM

## 2024-09-27 NOTE — PROGRESS NOTES
Subjective:      Patient ID: Sasha Michelle is a 73 y.o. female.    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 The current episode started more than 1 year ago. The problem occurs intermittently. The problem is unchanged. The quality of the pain is described as aching. The pain does not radiate. The pain is at a severity of 6/10. The pain is moderate. The symptoms are aggravated by sitting. Stiffness is present All day. Pertinent negatives include no leg pain, numbness or tingling. She has tried muscle relaxant and home exercises for the symptoms. The treatment provided mild relief.       Musculoskeletal:  Positive for pain, joint pain, back pain, muscle cramps and muscle ache. Negative for trauma, joint swelling and abnormal ROM of joint.   Skin:  Negative for erythema and bruising.   Neurological:  Negative for numbness and tingling.     See MA note above. Begin MD note:    Sasha Michelle is a 73 y.o. presenting for follow-up of chronic lumbar and thoracic pain, medication management. Slept wrong last night, right shoulder is bothering her a bit. Has been going on trips with , assists with driving at times. No changes to her current medication use.     Objective:     Physical Exam  Vitals and nursing note reviewed.   Constitutional:       General: She is not in acute distress.     Appearance: She is not ill-appearing.   HENT:      Head: Normocephalic.   Eyes:      Conjunctiva/sclera: Conjunctivae normal.   Pulmonary:      Effort: Pulmonary effort is normal. No respiratory distress.   Musculoskeletal:      Cervical back: No pain with movement.      Lumbar back: Tenderness present. No swelling or deformity. Decreased range of motion.         Back:       Comments: Able to rise from seated without difficulty or requiring assistance. No gross deformity noted to the lumbar spine.  Decreased ROM of lumbar spine on forward flexion.  Normal gait.   Skin:     General: Skin is warm.      Capillary Refill: Capillary refill takes less than 2 seconds.      Coloration: Skin is not pale.      Findings: No erythema.   Neurological:      General: No focal deficit present.      Mental Status: She is alert and oriented to person, place, and time.      GCS: GCS eye subscore is 4. GCS verbal subscore is 5. GCS motor subscore is 6.      Motor: Motor function is intact.      Coordination: Coordination is intact.   Psychiatric:         Attention and Perception: Attention normal.         Mood and Affect: Mood and affect normal.         Speech: Speech normal.         Behavior: Behavior normal. Behavior is cooperative.         Thought Content: Thought content normal.        Assessment:      1. Encounter for chronic pain management    2. Encounter related to worker's compensation claim    3. Chronic bilateral low back pain without sciatica    4. Degeneration of lumbar intervertebral disc    5. Spondylosis of thoracic region without myelopathy or radiculopathy    6. Sprain of ligaments of lumbar spine, subsequent encounter    7. Chronic cervical pain      Plan:     As Ms. Michelle has been following here for chronic pain management, I broached the subject of completion of a pain contract, we will discuss further at next appointment. Refills provided.    Medications Ordered This Encounter   Medications    HYDROcodone-acetaminophen (NORCO)  mg per tablet     Sig: Take 1 tablet by mouth every 6 (six) hours as needed for Pain.     Dispense:  100 tablet     Refill:  0     Quantity prescribed more than 7 day supply? Yes, quantity medically necessary     Order Specific Question:   I have reviewed the Prescription Drug Monitoring Program (PDMP) database for this patient prior to  prescribing the above opioid medication     Answer:   Yes    HYDROcodone-acetaminophen (NORCO)  mg per tablet     Sig: Take 1 tablet by mouth every 6 (six) hours as needed for Pain.     Dispense:  100 tablet     Refill:  0     Quantity prescribed more than 7 day supply? Yes, quantity medically necessary     Order Specific Question:   I have reviewed the Prescription Drug Monitoring Program (PDMP) database for this patient prior to prescribing the above opioid medication     Answer:   Yes    LIDOcaine (LIDODERM) 5 %     Sig: Place 1 patch onto the skin once daily. Remove & Discard patch within 12 hours or as directed by MD     Dispense:  30 patch     Refill:  2    tiZANidine (ZANAFLEX) 4 MG tablet     Si-2 tablet QHS PRN pain/spasm     Dispense:  30 tablet     Refill:  1             Diagnoses and plan discussed with the patient, as well as the expected course and duration of symptoms. Risks and benefits of any medication prescribed during this visit was explained, verbal instructions on use given. Clinic/Emergency department return precautions were given, can return to [facility] before scheduled follow-up appointment if notes worsening/aggravation of symptoms. All questions and concerns were addressed prior to discharge. Plan was developed with active input from the patient and they verbalized understanding of and agreement with the POC.  [facility] was informed of any referrals and relevant orders.  Note was dictated with voice recognition software, please excuse any grammatical errors.    I spent a total of 20 minutes on the day of the visit.  This includes face to face time and non-face to face time preparing to see the patient (e.g. review of medical record), obtaining and/or reviewing separately obtained history, documenting clinical information in the electronic or other health record, independently interpreting results and communicating results to the patient/family/caregiver, or care  coordinator.      Restrictions:  (Retired)  Follow up in about 10 weeks (around 12/6/2024).

## 2024-12-06 ENCOUNTER — OFFICE VISIT (OUTPATIENT)
Dept: URGENT CARE | Facility: CLINIC | Age: 74
End: 2024-12-06
Payer: OTHER GOVERNMENT

## 2024-12-06 VITALS
HEIGHT: 60 IN | OXYGEN SATURATION: 98 % | SYSTOLIC BLOOD PRESSURE: 147 MMHG | RESPIRATION RATE: 18 BRPM | BODY MASS INDEX: 29.25 KG/M2 | DIASTOLIC BLOOD PRESSURE: 72 MMHG | HEART RATE: 74 BPM | WEIGHT: 149 LBS

## 2024-12-06 DIAGNOSIS — G89.29 ENCOUNTER FOR CHRONIC PAIN MANAGEMENT: Primary | ICD-10-CM

## 2024-12-06 DIAGNOSIS — G89.29 CHRONIC BILATERAL LOW BACK PAIN WITHOUT SCIATICA: ICD-10-CM

## 2024-12-06 DIAGNOSIS — M51.369 DEGENERATION OF LUMBAR INTERVERTEBRAL DISC: ICD-10-CM

## 2024-12-06 DIAGNOSIS — S33.5XXD SPRAIN OF LIGAMENTS OF LUMBAR SPINE, SUBSEQUENT ENCOUNTER: ICD-10-CM

## 2024-12-06 DIAGNOSIS — G89.29 CHRONIC CERVICAL PAIN: ICD-10-CM

## 2024-12-06 DIAGNOSIS — M47.814 SPONDYLOSIS OF THORACIC REGION WITHOUT MYELOPATHY OR RADICULOPATHY: ICD-10-CM

## 2024-12-06 DIAGNOSIS — M54.2 CHRONIC CERVICAL PAIN: ICD-10-CM

## 2024-12-06 DIAGNOSIS — M54.50 CHRONIC BILATERAL LOW BACK PAIN WITHOUT SCIATICA: ICD-10-CM

## 2024-12-06 DIAGNOSIS — M51.360 DEGENERATION OF INTERVERTEBRAL DISC OF LUMBAR REGION WITH DISCOGENIC BACK PAIN: ICD-10-CM

## 2024-12-06 DIAGNOSIS — Z02.6 ENCOUNTER RELATED TO WORKER'S COMPENSATION CLAIM: ICD-10-CM

## 2024-12-06 RX ORDER — HYDROCODONE BITARTRATE AND ACETAMINOPHEN 10; 325 MG/1; MG/1
1 TABLET ORAL EVERY 6 HOURS PRN
Qty: 100 TABLET | Refills: 0 | Status: SHIPPED | OUTPATIENT
Start: 2025-01-06

## 2024-12-06 RX ORDER — LIDOCAINE 50 MG/G
1 PATCH TOPICAL DAILY
Qty: 30 PATCH | Refills: 3 | Status: SHIPPED | OUTPATIENT
Start: 2024-12-06

## 2024-12-06 RX ORDER — HYDROCODONE BITARTRATE AND ACETAMINOPHEN 10; 325 MG/1; MG/1
1 TABLET ORAL EVERY 6 HOURS PRN
Qty: 100 TABLET | Refills: 0 | Status: SHIPPED | OUTPATIENT
Start: 2025-02-06

## 2024-12-06 RX ORDER — NALOXONE HYDROCHLORIDE 4 MG/.1ML
SPRAY NASAL
Qty: 1 EACH | Refills: 11 | Status: SHIPPED | OUTPATIENT
Start: 2024-12-06

## 2024-12-06 RX ORDER — TIZANIDINE 4 MG/1
TABLET ORAL
Qty: 30 TABLET | Refills: 3 | Status: SHIPPED | OUTPATIENT
Start: 2024-12-06

## 2024-12-06 RX ORDER — HYDROCODONE BITARTRATE AND ACETAMINOPHEN 10; 325 MG/1; MG/1
1 TABLET ORAL EVERY 6 HOURS PRN
Qty: 100 TABLET | Refills: 0 | Status: SHIPPED | OUTPATIENT
Start: 2024-12-06

## 2024-12-06 NOTE — PROGRESS NOTES
Subjective:      Patient ID: Sasha Michelle is a 73 y.o. female.    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 Negative for trauma, joint swelling and abnormal ROM of joint.   Skin:  Negative for erythema and bruising.   Neurological:  Negative for numbness and tingling.       See MA note above. Begin MD note:    Sasha Michelle is a 73 y.o. presenting for follow-up, accompanied by . Pain symptoms unchanged, episodes of increased pain with more activity, but controlled with medication.     Objective:     Physical Exam  Vitals and nursing note reviewed.   Constitutional:       General: She is not in acute distress.     Appearance: She is not ill-appearing.   HENT:      Head: Normocephalic.   Eyes:      Conjunctiva/sclera: Conjunctivae normal.   Pulmonary:      Effort: Pulmonary effort is normal. No respiratory distress.   Musculoskeletal:      Cervical back: No pain with movement.      Lumbar back: Tenderness present. No swelling or deformity. Decreased range of motion.        Back:       Comments: Seated on exam table, good posture. No pain with seated slump test. Resisted HF/HE 5/5. FAROM of the lumbar spine. No gross deformity noted to the lumbar spine.  TTP of the right mid thoracic paraspinal musculature and bilateral lumbar region. Normal gait.   Skin:     General: Skin is warm.      Capillary Refill: Capillary refill takes less than 2 seconds.      Coloration: Skin is not pale.      Findings: No erythema.   Neurological:      General: No focal deficit present.      Mental Status: She is alert and oriented to person, place, and time.      GCS: GCS eye subscore is 4. GCS verbal subscore is  5. GCS motor subscore is 6.      Motor: Motor function is intact.      Coordination: Coordination is intact.   Psychiatric:         Attention and Perception: Attention normal.         Mood and Affect: Mood and affect normal.         Speech: Speech normal.         Behavior: Behavior normal. Behavior is cooperative.         Thought Content: Thought content normal.        Assessment:      1. Encounter for chronic pain management    2. Encounter related to worker's compensation claim    3. Chronic bilateral low back pain without sciatica    4. Spondylosis of thoracic region without myelopathy or radiculopathy    5. Sprain of ligaments of lumbar spine, subsequent encounter    6. Chronic cervical pain    7. Degeneration of intervertebral disc of lumbar region with discogenic back pain    8. Degeneration of lumbar intervertebral disc      Plan:     Pain medicine contract completed, faxed to HIM. Refills provided.     Medications Ordered This Encounter   Medications    HYDROcodone-acetaminophen (NORCO)  mg per tablet     Sig: Take 1 tablet by mouth every 6 (six) hours as needed for Pain.     Dispense:  100 tablet     Refill:  0     Quantity prescribed more than 7 day supply? Yes, quantity medically necessary     Order Specific Question:   I have reviewed the Prescription Drug Monitoring Program (PDMP) database for this patient prior to prescribing the above opioid medication     Answer:   Yes    HYDROcodone-acetaminophen (NORCO)  mg per tablet     Sig: Take 1 tablet by mouth every 6 (six) hours as needed for Pain.     Dispense:  100 tablet     Refill:  0     Quantity prescribed more than 7 day supply? Yes, quantity medically necessary     Order Specific Question:   I have reviewed the Prescription Drug Monitoring Program (PDMP) database for this patient prior to prescribing the above opioid medication     Answer:   Yes    HYDROcodone-acetaminophen (NORCO)  mg per tablet     Sig: Take 1 tablet by mouth  every 6 (six) hours as needed for Pain.     Dispense:  100 tablet     Refill:  0     Quantity prescribed more than 7 day supply? Yes, quantity medically necessary     Order Specific Question:   I have reviewed the Prescription Drug Monitoring Program (PDMP) database for this patient prior to prescribing the above opioid medication     Answer:   Yes    LIDOcaine (LIDODERM) 5 %     Sig: Place 1 patch onto the skin once daily. Remove & Discard patch within 12 hours or as directed by MD     Dispense:  30 patch     Refill:  3    naloxone (NARCAN) 4 mg/actuation Spry     Simg by nasal route as needed for opioid overdose; may repeat every 2-3 minutes in alternating nostrils until medical help arrives. Call 911     Dispense:  1 each     Refill:  11    tiZANidine (ZANAFLEX) 4 MG tablet     Si-2 tablet QHS PRN pain/spasm     Dispense:  30 tablet     Refill:  3           Diagnoses and plan discussed with the patient, as well as the expected course and duration of symptoms. Risks and benefits of any medication prescribed during this visit was explained, verbal instructions on use given. Clinic/Emergency department return precautions were given, can return to [facility] before scheduled follow-up appointment if notes worsening/aggravation of symptoms. All questions and concerns were addressed prior to discharge. Plan was developed with active input from the patient and they verbalized understanding of and agreement with the POC.  C was informed of any referrals and relevant orders.  Note was dictated with voice recognition software, please excuse any grammatical errors.    I spent a total of 25 minutes on the day of the visit.  This includes face to face time and non-face to face time preparing to see the patient (e.g. review of medical record), obtaining and/or reviewing separately obtained history, documenting clinical information in the electronic or other health record, independently interpreting results and  communicating results to the patient/family/caregiver, or care coordinator.    Restrictions:  (RETIRED)  Follow up in about 13 weeks (around 3/7/2025).

## 2024-12-06 NOTE — LETTER
[facility]  [address] [address]  Phone: [phone]  Fax: [phone]  [facility]: [id number]    Pt Name: Sasha John Date: 03/15/2003   Employee ID: [id number] Date of Treatment: 12/06/2024   Company: [employer]      Appointment Time: 08:30 AM Arrived: 8:15 AM    Provider: Lazaro Kumar MD Time Out:9:25 AM      Office Treatment:   1. Encounter for chronic pain management    2. Encounter related to worker's compensation claim    3. Chronic bilateral low back pain without sciatica    4. Spondylosis of thoracic region without myelopathy or radiculopathy    5. Sprain of ligaments of lumbar spine, subsequent encounter    6. Chronic cervical pain    7. Degeneration of intervertebral disc of lumbar region with discogenic back pain    8. Degeneration of lumbar intervertebral disc      Medications Ordered This Encounter   Medications    HYDROcodone-acetaminophen (NORCO)  mg per tablet    HYDROcodone-acetaminophen (NORCO)  mg per tablet    HYDROcodone-acetaminophen (NORCO)  mg per tablet    LIDOcaine (LIDODERM) 5 %    naloxone (NARCAN) 4 mg/actuation Spry    tiZANidine (ZANAFLEX) 4 MG tablet           Restrictions:  (RETIRED)     Return Appointment: 3/7/2025 at 8:30 AM NIKOLAY

## 2025-03-07 ENCOUNTER — OFFICE VISIT (OUTPATIENT)
Dept: URGENT CARE | Facility: CLINIC | Age: 75
End: 2025-03-07
Payer: OTHER GOVERNMENT

## 2025-03-07 VITALS
DIASTOLIC BLOOD PRESSURE: 68 MMHG | HEIGHT: 60 IN | RESPIRATION RATE: 18 BRPM | OXYGEN SATURATION: 98 % | SYSTOLIC BLOOD PRESSURE: 148 MMHG | HEART RATE: 64 BPM | BODY MASS INDEX: 29.25 KG/M2 | WEIGHT: 149 LBS

## 2025-03-07 DIAGNOSIS — M47.814 SPONDYLOSIS OF THORACIC REGION WITHOUT MYELOPATHY OR RADICULOPATHY: ICD-10-CM

## 2025-03-07 DIAGNOSIS — M54.50 CHRONIC BILATERAL LOW BACK PAIN WITHOUT SCIATICA: ICD-10-CM

## 2025-03-07 DIAGNOSIS — Z02.6 ENCOUNTER RELATED TO WORKER'S COMPENSATION CLAIM: ICD-10-CM

## 2025-03-07 DIAGNOSIS — G89.29 ENCOUNTER FOR CHRONIC PAIN MANAGEMENT: Primary | ICD-10-CM

## 2025-03-07 DIAGNOSIS — G89.29 CHRONIC BILATERAL LOW BACK PAIN WITHOUT SCIATICA: ICD-10-CM

## 2025-03-07 DIAGNOSIS — M51.360 DEGENERATION OF INTERVERTEBRAL DISC OF LUMBAR REGION WITH DISCOGENIC BACK PAIN: ICD-10-CM

## 2025-03-07 DIAGNOSIS — S33.5XXD SPRAIN OF LIGAMENTS OF LUMBAR SPINE, SUBSEQUENT ENCOUNTER: ICD-10-CM

## 2025-03-07 DIAGNOSIS — G89.29 CHRONIC CERVICAL PAIN: ICD-10-CM

## 2025-03-07 DIAGNOSIS — M54.2 CHRONIC CERVICAL PAIN: ICD-10-CM

## 2025-03-07 RX ORDER — HYDROCODONE BITARTRATE AND ACETAMINOPHEN 10; 325 MG/1; MG/1
1 TABLET ORAL EVERY 6 HOURS PRN
Qty: 100 TABLET | Refills: 0 | Status: SHIPPED | OUTPATIENT
Start: 2025-05-07

## 2025-03-07 RX ORDER — TIZANIDINE 4 MG/1
TABLET ORAL
Qty: 30 TABLET | Refills: 3 | Status: SHIPPED | OUTPATIENT
Start: 2025-03-07

## 2025-03-07 RX ORDER — VALSARTAN AND HYDROCHLOROTHIAZIDE 160; 25 MG/1; MG/1
1 TABLET ORAL
COMMUNITY
Start: 2025-02-06

## 2025-03-07 RX ORDER — HYDROCODONE BITARTRATE AND ACETAMINOPHEN 10; 325 MG/1; MG/1
1 TABLET ORAL EVERY 6 HOURS PRN
Qty: 100 TABLET | Refills: 0 | Status: SHIPPED | OUTPATIENT
Start: 2025-04-07

## 2025-03-07 RX ORDER — HYDROCODONE BITARTRATE AND ACETAMINOPHEN 10; 325 MG/1; MG/1
1 TABLET ORAL EVERY 6 HOURS PRN
Qty: 100 TABLET | Refills: 0 | Status: SHIPPED | OUTPATIENT
Start: 2025-03-07

## 2025-03-07 RX ORDER — LIDOCAINE 50 MG/G
1 PATCH TOPICAL DAILY
Qty: 30 PATCH | Refills: 3 | Status: SHIPPED | OUTPATIENT
Start: 2025-03-07

## 2025-03-07 NOTE — PROGRESS NOTES
Subjective:      Patient ID: Sasha Michelle is a 74 y.o. female.    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 Negative for trauma, joint swelling and abnormal ROM of joint.   Skin:  Negative for erythema and bruising.       See MA note above. Begin MD note:    Sasha Michelle is a 74 y.o. presenting for follow-up of chronic lumbar and thoracic pain, medication management.   Pain symptoms unchanged, but controlled with medication.     Objective:     Physical Exam  Vitals and nursing note reviewed.   Constitutional:       General: She is not in acute distress.     Appearance: She is not ill-appearing.   HENT:      Head: Normocephalic.   Eyes:      Conjunctiva/sclera: Conjunctivae normal.   Pulmonary:      Effort: Pulmonary effort is normal. No respiratory distress.   Musculoskeletal:      Cervical back: No pain with movement.      Lumbar back: Tenderness present. No swelling or deformity. Decreased range of motion.      Comments: Resisted HF/HE 5/5. FAROM of the lumbar spine. No gross deformity noted to the lumbar spine.  TTP of the right mid thoracic paraspinal musculature and bilateral lumbar region. Normal gait.   Skin:     General: Skin is warm.      Capillary Refill: Capillary refill takes less than 2 seconds.      Coloration: Skin is not pale.      Findings: No erythema.   Neurological:      General: No focal deficit present.      Mental Status: She is alert and oriented to person, place, and time.      GCS: GCS eye subscore is 4. GCS verbal subscore is 5. GCS motor subscore is 6.      Motor: Motor function is intact.      Coordination: Coordination is intact.    Psychiatric:         Attention and Perception: Attention normal.         Mood and Affect: Mood and affect normal.         Speech: Speech normal.         Behavior: Behavior normal. Behavior is cooperative.         Thought Content: Thought content normal.        Assessment:      1. Encounter for chronic pain management    2. Encounter related to worker's compensation claim    3. Chronic bilateral low back pain without sciatica    4. Spondylosis of thoracic region without myelopathy or radiculopathy    5. Sprain of ligaments of lumbar spine, subsequent encounter    6. Chronic cervical pain    7. Degeneration of intervertebral disc of lumbar region with discogenic back pain    8. Degeneration of lumbar intervertebral disc      Plan:     Continue chronic pain management. Refills provided, 2 post-dated.     Medications Ordered This Encounter   Medications    HYDROcodone-acetaminophen (NORCO)  mg per tablet     Sig: Take 1 tablet by mouth every 6 (six) hours as needed for Pain.     Dispense:  100 tablet     Refill:  0     Quantity prescribed more than 7 day supply? Yes, quantity medically necessary     I have reviewed the Prescription Drug Monitoring Program (PDMP) database for this patient prior to prescribing the above opioid medication:   Yes    HYDROcodone-acetaminophen (NORCO)  mg per tablet     Sig: Take 1 tablet by mouth every 6 (six) hours as needed for Pain.     Dispense:  100 tablet     Refill:  0     Quantity prescribed more than 7 day supply? Yes, quantity medically necessary     I have reviewed the Prescription Drug Monitoring Program (PDMP) database for this patient prior to prescribing the above opioid medication:   Yes    HYDROcodone-acetaminophen (NORCO)  mg per tablet     Sig: Take 1 tablet by mouth every 6 (six) hours as needed for Pain.     Dispense:  100 tablet     Refill:  0     Quantity prescribed more than 7 day supply? Yes, quantity medically necessary     I have reviewed the  Prescription Drug Monitoring Program (PDMP) database for this patient prior to prescribing the above opioid medication:   Yes    LIDOcaine (LIDODERM) 5 %     Sig: Place 1 patch onto the skin once daily. Remove & Discard patch within 12 hours or as directed by MD     Dispense:  30 patch     Refill:  3    tiZANidine (ZANAFLEX) 4 MG tablet     Si-2 tablet QHS PRN pain/spasm     Dispense:  30 tablet     Refill:  3         Diagnoses and plan discussed with the patient, as well as the expected course and duration of symptoms. Risks and benefits of any medication prescribed during this visit was explained, verbal instructions on use given. Clinic/Emergency department return precautions were given, can return to [facility] before scheduled follow-up appointment if notes worsening/aggravation of symptoms. All questions and concerns were addressed prior to discharge. Plan was developed with active input from the patient and they verbalized understanding of and agreement with the POC.  C was informed of any referrals and relevant orders.  Note was dictated with voice recognition software, please excuse any grammatical errors.    I spent a total of 25 minutes on the day of the visit.  This includes face to face time and non-face to face time preparing to see the patient (e.g. review of medical record), obtaining and/or reviewing separately obtained history, documenting clinical information in the electronic or other health record, independently interpreting results and communicating results to the patient/family/caregiver, or care coordinator.    Restrictions:  (RETIRED)  Follow up in about 13 weeks (around 2025).

## 2025-03-07 NOTE — LETTER
[facility]  [address] [address]  Phone: [phone]  Fax: [phone]  [facility]: [id number]    Pt Name: Sasha John Date: 03/15/2003   Employee ID: [id number] Date of Treatment: 03/07/2025   Company: [employer]      Appointment Time: 08:30 AM Arrived: 8:16 AM    Provider: Lazaro Kumar MD Time Out:8:59 AM      Office Treatment:   1. Encounter for chronic pain management    2. Encounter related to worker's compensation claim    3. Chronic bilateral low back pain without sciatica    4. Spondylosis of thoracic region without myelopathy or radiculopathy    5. Sprain of ligaments of lumbar spine, subsequent encounter    6. Chronic cervical pain    7. Degeneration of intervertebral disc of lumbar region with discogenic back pain    8. Degeneration of lumbar intervertebral disc      Medications Ordered This Encounter   Medications    HYDROcodone-acetaminophen (NORCO)  mg per tablet    HYDROcodone-acetaminophen (NORCO)  mg per tablet    HYDROcodone-acetaminophen (NORCO)  mg per tablet    LIDOcaine (LIDODERM) 5 %    tiZANidine (ZANAFLEX) 4 MG tablet           Restrictions:  (RETIRED)     Return Appointment: 6/3/2025 at 8:30 AM KASSY

## 2025-06-06 ENCOUNTER — OFFICE VISIT (OUTPATIENT)
Dept: URGENT CARE | Facility: CLINIC | Age: 75
End: 2025-06-06
Payer: OTHER GOVERNMENT

## 2025-06-06 VITALS
DIASTOLIC BLOOD PRESSURE: 71 MMHG | HEART RATE: 67 BPM | BODY MASS INDEX: 27.68 KG/M2 | SYSTOLIC BLOOD PRESSURE: 168 MMHG | OXYGEN SATURATION: 98 % | TEMPERATURE: 98 F | RESPIRATION RATE: 16 BRPM | HEIGHT: 60 IN | WEIGHT: 141 LBS

## 2025-06-06 DIAGNOSIS — Z02.6 ENCOUNTER RELATED TO WORKER'S COMPENSATION CLAIM: ICD-10-CM

## 2025-06-06 DIAGNOSIS — G89.29 CHRONIC CERVICAL PAIN: ICD-10-CM

## 2025-06-06 DIAGNOSIS — S33.5XXD SPRAIN OF LIGAMENTS OF LUMBAR SPINE, SUBSEQUENT ENCOUNTER: ICD-10-CM

## 2025-06-06 DIAGNOSIS — M47.814 SPONDYLOSIS OF THORACIC REGION WITHOUT MYELOPATHY OR RADICULOPATHY: ICD-10-CM

## 2025-06-06 DIAGNOSIS — M54.50 CHRONIC BILATERAL LOW BACK PAIN WITHOUT SCIATICA: Primary | ICD-10-CM

## 2025-06-06 DIAGNOSIS — G89.29 ENCOUNTER FOR CHRONIC PAIN MANAGEMENT: ICD-10-CM

## 2025-06-06 DIAGNOSIS — M54.2 CHRONIC CERVICAL PAIN: ICD-10-CM

## 2025-06-06 DIAGNOSIS — M51.360 DEGENERATION OF INTERVERTEBRAL DISC OF LUMBAR REGION WITH DISCOGENIC BACK PAIN: ICD-10-CM

## 2025-06-06 DIAGNOSIS — G89.29 CHRONIC BILATERAL LOW BACK PAIN WITHOUT SCIATICA: Primary | ICD-10-CM

## 2025-06-06 RX ORDER — HYDROCODONE BITARTRATE AND ACETAMINOPHEN 10; 325 MG/1; MG/1
1 TABLET ORAL EVERY 6 HOURS PRN
Qty: 100 TABLET | Refills: 0 | Status: SHIPPED | OUTPATIENT
Start: 2025-07-06

## 2025-06-06 RX ORDER — TIZANIDINE 4 MG/1
TABLET ORAL
Qty: 30 TABLET | Refills: 3 | Status: SHIPPED | OUTPATIENT
Start: 2025-06-06

## 2025-06-06 RX ORDER — HYDROCODONE BITARTRATE AND ACETAMINOPHEN 10; 325 MG/1; MG/1
1 TABLET ORAL EVERY 6 HOURS PRN
Qty: 100 TABLET | Refills: 0 | Status: SHIPPED | OUTPATIENT
Start: 2025-08-06

## 2025-06-06 RX ORDER — IBUPROFEN 800 MG/1
800 TABLET, FILM COATED ORAL EVERY 6 HOURS PRN
Qty: 90 TABLET | Refills: 3 | Status: SHIPPED | OUTPATIENT
Start: 2025-06-06 | End: 2025-09-04

## 2025-06-06 RX ORDER — HYDROCODONE BITARTRATE AND ACETAMINOPHEN 10; 325 MG/1; MG/1
1 TABLET ORAL EVERY 6 HOURS PRN
Qty: 100 TABLET | Refills: 0 | Status: SHIPPED | OUTPATIENT
Start: 2025-06-06

## 2025-06-06 RX ORDER — LIDOCAINE 50 MG/G
1 PATCH TOPICAL DAILY
Qty: 30 PATCH | Refills: 3 | Status: SHIPPED | OUTPATIENT
Start: 2025-06-06

## 2025-09-05 ENCOUNTER — OFFICE VISIT (OUTPATIENT)
Dept: URGENT CARE | Facility: CLINIC | Age: 75
End: 2025-09-05
Payer: OTHER GOVERNMENT

## 2025-09-05 VITALS
OXYGEN SATURATION: 98 % | DIASTOLIC BLOOD PRESSURE: 79 MMHG | HEART RATE: 64 BPM | HEIGHT: 60 IN | RESPIRATION RATE: 16 BRPM | SYSTOLIC BLOOD PRESSURE: 180 MMHG | BODY MASS INDEX: 27.68 KG/M2 | WEIGHT: 141 LBS

## 2025-09-05 DIAGNOSIS — G89.29 ENCOUNTER FOR CHRONIC PAIN MANAGEMENT: ICD-10-CM

## 2025-09-05 DIAGNOSIS — S33.5XXD SPRAIN OF LIGAMENTS OF LUMBAR SPINE, SUBSEQUENT ENCOUNTER: ICD-10-CM

## 2025-09-05 DIAGNOSIS — Z02.6 ENCOUNTER RELATED TO WORKER'S COMPENSATION CLAIM: ICD-10-CM

## 2025-09-05 DIAGNOSIS — M47.814 SPONDYLOSIS OF THORACIC REGION WITHOUT MYELOPATHY OR RADICULOPATHY: ICD-10-CM

## 2025-09-05 DIAGNOSIS — M54.2 CHRONIC CERVICAL PAIN: ICD-10-CM

## 2025-09-05 DIAGNOSIS — M54.50 CHRONIC BILATERAL LOW BACK PAIN WITHOUT SCIATICA: Primary | ICD-10-CM

## 2025-09-05 DIAGNOSIS — M51.360 DEGENERATION OF INTERVERTEBRAL DISC OF LUMBAR REGION WITH DISCOGENIC BACK PAIN: ICD-10-CM

## 2025-09-05 DIAGNOSIS — G89.29 CHRONIC CERVICAL PAIN: ICD-10-CM

## 2025-09-05 DIAGNOSIS — G89.29 CHRONIC BILATERAL LOW BACK PAIN WITHOUT SCIATICA: Primary | ICD-10-CM

## 2025-09-05 RX ORDER — LIDOCAINE 50 MG/G
1 PATCH TOPICAL DAILY
Qty: 30 PATCH | Refills: 3 | Status: SHIPPED | OUTPATIENT
Start: 2025-09-05

## 2025-09-05 RX ORDER — HYDROCODONE BITARTRATE AND ACETAMINOPHEN 10; 325 MG/1; MG/1
1 TABLET ORAL EVERY 6 HOURS PRN
Qty: 100 TABLET | Refills: 0 | Status: SHIPPED | OUTPATIENT
Start: 2025-11-05

## 2025-09-05 RX ORDER — TIZANIDINE 4 MG/1
TABLET ORAL
Qty: 30 TABLET | Refills: 3 | Status: SHIPPED | OUTPATIENT
Start: 2025-09-05

## 2025-09-05 RX ORDER — HYDROCODONE BITARTRATE AND ACETAMINOPHEN 10; 325 MG/1; MG/1
1 TABLET ORAL EVERY 6 HOURS PRN
Qty: 100 TABLET | Refills: 0 | Status: SHIPPED | OUTPATIENT
Start: 2025-09-05

## 2025-09-05 RX ORDER — METHYLPREDNISOLONE 4 MG/1
TABLET ORAL
Qty: 21 EACH | Refills: 1 | Status: SHIPPED | OUTPATIENT
Start: 2025-09-05 | End: 2025-09-26

## 2025-09-05 RX ORDER — NALOXONE HYDROCHLORIDE 4 MG/.1ML
SPRAY NASAL
Qty: 1 EACH | Refills: 11 | Status: SHIPPED | OUTPATIENT
Start: 2025-09-05

## 2025-09-05 RX ORDER — IBUPROFEN 800 MG/1
800 TABLET, FILM COATED ORAL EVERY 6 HOURS PRN
Qty: 90 TABLET | Refills: 3 | Status: SHIPPED | OUTPATIENT
Start: 2025-09-05 | End: 2025-12-04

## 2025-09-05 RX ORDER — HYDROCODONE BITARTRATE AND ACETAMINOPHEN 10; 325 MG/1; MG/1
1 TABLET ORAL EVERY 6 HOURS PRN
Qty: 100 TABLET | Refills: 0 | Status: SHIPPED | OUTPATIENT
Start: 2025-10-05